# Patient Record
Sex: FEMALE | Race: WHITE | NOT HISPANIC OR LATINO | Employment: FULL TIME | URBAN - METROPOLITAN AREA
[De-identification: names, ages, dates, MRNs, and addresses within clinical notes are randomized per-mention and may not be internally consistent; named-entity substitution may affect disease eponyms.]

---
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
## 2020-10-05 ENCOUNTER — OFFICE VISIT (OUTPATIENT)
Dept: FAMILY MEDICINE | Facility: CLINIC | Age: 40
End: 2020-10-05
Payer: COMMERCIAL

## 2020-10-05 VITALS
RESPIRATION RATE: 16 BRPM | TEMPERATURE: 98 F | HEART RATE: 89 BPM | OXYGEN SATURATION: 97 % | WEIGHT: 156.6 LBS | DIASTOLIC BLOOD PRESSURE: 74 MMHG | HEIGHT: 63 IN | BODY MASS INDEX: 27.75 KG/M2 | SYSTOLIC BLOOD PRESSURE: 110 MMHG

## 2020-10-05 DIAGNOSIS — Z00.00 ROUTINE HISTORY AND PHYSICAL EXAMINATION OF ADULT: Primary | ICD-10-CM

## 2020-10-05 DIAGNOSIS — Z01.00 VISIT FOR EYE AND VISION EXAM: ICD-10-CM

## 2020-10-05 DIAGNOSIS — Z01.10 ENCOUNTER FOR HEARING EXAMINATION, UNSPECIFIED WHETHER ABNORMAL FINDINGS: ICD-10-CM

## 2020-10-05 DIAGNOSIS — Z01.110 ENCOUNTER FOR HEARING EXAMINATION FOLLOWING FAILED HEARING SCREENING: ICD-10-CM

## 2020-10-05 DIAGNOSIS — F64.9 GENDER DYSPHORIA: ICD-10-CM

## 2020-10-05 DIAGNOSIS — Z23 NEED FOR PROPHYLACTIC VACCINATION AND INOCULATION AGAINST INFLUENZA: ICD-10-CM

## 2020-10-05 DIAGNOSIS — Z13.9 SCREENING FOR CONDITION: ICD-10-CM

## 2020-10-05 PROBLEM — O14.90 PREECLAMPSIA: Status: ACTIVE | Noted: 2020-10-05

## 2020-10-05 PROBLEM — O14.20 HELLP SYNDROME: Status: ACTIVE | Noted: 2020-10-05

## 2020-10-05 PROCEDURE — 99000 SPECIMEN HANDLING OFFICE-LAB: CPT | Performed by: FAMILY MEDICINE

## 2020-10-05 PROCEDURE — 87591 N.GONORRHOEAE DNA AMP PROB: CPT | Performed by: FAMILY MEDICINE

## 2020-10-05 PROCEDURE — 36415 COLL VENOUS BLD VENIPUNCTURE: CPT | Performed by: STUDENT IN AN ORGANIZED HEALTH CARE EDUCATION/TRAINING PROGRAM

## 2020-10-05 PROCEDURE — 86706 HEP B SURFACE ANTIBODY: CPT | Performed by: FAMILY MEDICINE

## 2020-10-05 PROCEDURE — 99385 PREV VISIT NEW AGE 18-39: CPT | Mod: 25 | Performed by: STUDENT IN AN ORGANIZED HEALTH CARE EDUCATION/TRAINING PROGRAM

## 2020-10-05 PROCEDURE — 86780 TREPONEMA PALLIDUM: CPT | Performed by: FAMILY MEDICINE

## 2020-10-05 PROCEDURE — 87389 HIV-1 AG W/HIV-1&-2 AB AG IA: CPT | Performed by: FAMILY MEDICINE

## 2020-10-05 PROCEDURE — 90686 IIV4 VACC NO PRSV 0.5 ML IM: CPT | Performed by: STUDENT IN AN ORGANIZED HEALTH CARE EDUCATION/TRAINING PROGRAM

## 2020-10-05 PROCEDURE — 90472 IMMUNIZATION ADMIN EACH ADD: CPT | Performed by: STUDENT IN AN ORGANIZED HEALTH CARE EDUCATION/TRAINING PROGRAM

## 2020-10-05 PROCEDURE — 86803 HEPATITIS C AB TEST: CPT | Performed by: FAMILY MEDICINE

## 2020-10-05 PROCEDURE — 90715 TDAP VACCINE 7 YRS/> IM: CPT | Performed by: STUDENT IN AN ORGANIZED HEALTH CARE EDUCATION/TRAINING PROGRAM

## 2020-10-05 PROCEDURE — 90471 IMMUNIZATION ADMIN: CPT | Performed by: STUDENT IN AN ORGANIZED HEALTH CARE EDUCATION/TRAINING PROGRAM

## 2020-10-05 PROCEDURE — 87491 CHLMYD TRACH DNA AMP PROBE: CPT | Performed by: FAMILY MEDICINE

## 2020-10-05 ASSESSMENT — MIFFLIN-ST. JEOR: SCORE: 1354.46

## 2020-10-05 NOTE — LETTER
October 7, 2020       Luis Gutierrez  [address]  [place] [address]        Dear Luis,    Thank you for getting your care at [facility]. Please see below for your test results.  These results are all normal and reassuring.    Resulted Orders   Neisseria gonorrhoeae PCR   Result Value Ref Range    Specimen Descrip Urine     N Gonorrhea PCR Negative NEG^Negative      Comment:      Negative for N. gonorrhoeae rRNA by transcription mediated amplification.  A negative result by transcription mediated amplification does not preclude   the presence of N. gonorrhoeae infection because results are dependent on   proper and adequate collection, absence of inhibitors, and sufficient rRNA to   be detected.     Chlamydia trachomatis PCR   Result Value Ref Range    Specimen Description Urine     Chlamydia Trachomatis PCR Negative NEG^Negative      Comment:      Negative for C. trachomatis rRNA by transcription mediated amplification.  A negative result by transcription mediated amplification does not preclude   the presence of C. trachomatis infection because results are dependent on   proper and adequate collection, absence of inhibitors, and sufficient rRNA to   be detected.     Treponema Abs w Reflex to RPR and Titer   Result Value Ref Range    Treponema Antibodies Nonreactive NR^Nonreactive      Comment:      Methodology Change: Test performed on the GRAVIDI Liaison XL by Treponema   pallidum Total Antibodies Assay as of 3.17.2020.     HIV Antigen Antibody Combo   Result Value Ref Range    HIV Antigen Antibody Combo Nonreactive NR^Nonreactive          Comment:      HIV-1 p24 Ag & HIV-1/HIV-2 Ab Not Detected   Hepatitis B Surface Antibody   Result Value Ref Range    Hepatitis B Surface Antibody 302.17 (H) <8.00 m[IU]/mL      Comment:      Reactive, Patient is considered to be immune to infection with hepatitis B   when the value is greater than or equal to 12.0 m[IU]/mL.     Hepatitis C antibody   Result Value  Ref Range    Hepatitis C Antibody Nonreactive NR^Nonreactive      Comment:      Assay performance characteristics have not been established for newborns,   infants, and children         If you have any concerns about these results please call and leave a message for me or send a Vaximmt message to the clinic.    Sincerely,    Gladis Ray MD

## 2020-10-06 LAB
C TRACH DNA SPEC QL NAA+PROBE: NEGATIVE
HBV SURFACE AB SERPL IA-ACNC: 302.17 M[IU]/ML
HCV AB SERPL QL IA: NONREACTIVE
HIV 1+2 AB+HIV1 P24 AG SERPL QL IA: NONREACTIVE
N GONORRHOEA DNA SPEC QL NAA+PROBE: NEGATIVE
SPECIMEN SOURCE: NORMAL
SPECIMEN SOURCE: NORMAL
T PALLIDUM AB SER QL: NONREACTIVE

## 2020-10-09 ENCOUNTER — OFFICE VISIT (OUTPATIENT)
Dept: OPHTHALMOLOGY | Facility: CLINIC | Age: 40
End: 2020-10-09
Attending: FAMILY MEDICINE
Payer: COMMERCIAL

## 2020-10-09 DIAGNOSIS — Z01.00 VISIT FOR EYE AND VISION EXAM: ICD-10-CM

## 2020-10-09 DIAGNOSIS — H52.223 REGULAR ASTIGMATISM OF BOTH EYES: Primary | ICD-10-CM

## 2020-10-09 PROCEDURE — 92004 COMPRE OPH EXAM NEW PT 1/>: CPT | Performed by: OPTOMETRIST

## 2020-10-09 PROCEDURE — 92015 DETERMINE REFRACTIVE STATE: CPT | Performed by: OPTOMETRIST

## 2020-10-09 ASSESSMENT — REFRACTION_MANIFEST
OD_CYLINDER: +0.50
OS_CYLINDER: +0.50
OS_SPHERE: +0.75
OD_SPHERE: -0.25
OD_AXIS: 015
OS_AXIS: 165

## 2020-10-09 ASSESSMENT — EXTERNAL EXAM - RIGHT EYE: OD_EXAM: NORMAL

## 2020-10-09 ASSESSMENT — TONOMETRY
OS_IOP_MMHG: 16
IOP_METHOD: ICARE
OD_IOP_MMHG: 16

## 2020-10-09 ASSESSMENT — VISUAL ACUITY
OS_SC: 20/20
OD_SC: 20/20
METHOD_MR_RETINOSCOPY: 1
METHOD: SNELLEN - LINEAR
OS_SC: 20/20
OD_SC: 20/20

## 2020-10-09 ASSESSMENT — CONF VISUAL FIELD
OD_NORMAL: 1
OS_NORMAL: 1
METHOD: COUNTING FINGERS

## 2020-10-09 ASSESSMENT — CUP TO DISC RATIO
OS_RATIO: 0.25
OD_RATIO: 0.25

## 2020-10-09 ASSESSMENT — EXTERNAL EXAM - LEFT EYE: OS_EXAM: NORMAL

## 2020-10-09 ASSESSMENT — SLIT LAMP EXAM - LIDS
COMMENTS: NORMAL
COMMENTS: NORMAL

## 2020-10-09 NOTE — NURSING NOTE
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 Denies blurred vision, doesn't wear gls or CTL.     Radha Mcgowan COT October 9, 2020 7:41 AM

## 2020-10-09 NOTE — PROGRESS NOTES
Assessment/Plan  (H52.223) Regular astigmatism of both eyes  (primary encounter diagnosis)  Comment: Minimal. Excellent uncorrected vision  Plan: REFRACTION [74688]        SRx updated and released for patient. Plan on monitoring every 1-2 years. RTC sooner with vision changes or eye pain.     (Z01.00) Visit for eye and vision exam  Plan: See above. Monitor regularly.       Complete documentation of historical and exam elements from today's encounter can  be found in the full encounter summary report (not reduplicated in this progress  note). I personally obtained the chief complaint(s) and history of present illness. I  confirmed and edited as necessary the review of systems, past medical/surgical  history, family history, social history, and examination findings as documented by  others; and I examined the patient myself. I personally reviewed the relevant tests,  images, and reports as documented above. I formulated and edited as necessary the  assessment and plan and discussed the findings and management plan with the  patient and family.    Stuart Harrington, OD

## 2020-10-09 NOTE — PROGRESS NOTES
Preceptor Attestation:   Patient seen, evaluated and discussed with the resident. I have verified the content of the note, which accurately reflects my assessment of the patient and the plan of care.   Supervising Physician:  Joyce Kohli, DO

## 2020-10-16 ENCOUNTER — TELEPHONE (OUTPATIENT)
Dept: AUDIOLOGY | Facility: CLINIC | Age: 40
End: 2020-10-16

## 2020-11-08 NOTE — PROGRESS NOTES
"       JASMINA Smith is a 40 year old individual that uses pronouns they/them and is consulting due to:    Find out about taking hormones - and what needs to be done.  Interested in sterilization. Had HELLP, pre-e. Tried IUD, not a good fit.    Gender identity  Gender Identity or expression  Non-binary. Genderqueer.     Sex Assigned at Birth   female    Sexual or romantic orientation  [place]ahl    Gender journey:   Didn't think about this much before. Experience of being pregnant (9 years ago), started to feel dysphoria. Maybe didn't recognize it as that then. More apparent in the last 5-6 years. \"Feels late\" because they are 40, so put off thinkin about it.   Don't necessarily want to \"pass as a man\" but feeling more home in body.   In certain situations feels dysphoric - ex OBGYN exam, or way they are treated in hospital or assumptions about what they wanted in their care.   Clothing, etc.     Support network for gender identity:   Don't live close to family of origin.  Here for 20 years. College friends who they are close with - they are supported.  Out to partner and friends. Talked to parents about it but unsure how much they recognized or understood it. Not a bid deal to them. Therapist. Not anyone at work - this is kind of tricky, just started as a . Trying to pay low. No \"safety\" concerns but more concerns re: losing work.    Anatomic dysphoria  Chest, sometimes upper thighs.  Clothing has helped. Doesn't wear bras.     Body characteristics pt is happy with and does not want to change:    More body hair, \"having a bigger sherman,\" Voice.  Unsure how long they will take it for.  Facial hair.      Body characteristics pt is NOT happy with and WANTS to change:    Doesn't want acne    Procedures or Surgical interventions desired:   Sterilization - meaning: tubes tied, ?hysterectomy?. Unsure. Reproductive potential is what is bothersome, but would also be great to be rid of menses.  (Pregnancy not a " potential in current partnership.)  Eventually top surgery?    Previous medical care related to gender affirmation:   Prior providers None  What hormones have you been on and for how long? None  Previous surgeries related to gender affirmation include: None    Mental Health Assessment:  Active symptoms: anxiety primarily > some depression which is what they are seeing therapist for   Psych dx history Major depression  Psych hospitalizations none  Hx of self harm:  No  Hx of suicidal thoughts: No  Hx of suicide attempts: No    Therapy history for gender support: Yes - has discussed with general therapist  Non gender related therapist? Yes  Chemical use history None    Medications prescribed for above and by whom? None  External Records received: No    Past History   Past Surgical History:   Procedure Laterality Date      SECTION  2012     Patient Active Problem List   Diagnosis     Preeclampsia     HELLP syndrome     High-risk pregnancy supervision     Labor, failed, induction medical     Obesity (BMI 30-39.9)     S/P  section     Nicotine dependence, uncomplicated, unspecified nicotine product type     Depression   No hx VTE, MI, stroke, CA, significant mental health concerns.      No current outpatient medications on file.     Family History   Problem Relation Age of Onset     Hypertension Father      Substance Abuse Father      Cancer Maternal Grandfather 50        Bladder cancer. Smoker.     Heart Disease Maternal Grandfather      Asthma Daughter      Glaucoma No family hx of      Macular Degeneration No family hx of        No Known Allergies  Social History     Socioeconomic History     Marital status: Single     Spouse name: Not on file     Number of children: Not on file     Years of education: Not on file     Highest education level: Not on file   Occupational History     Not on file   Social Needs     Financial resource strain: Not on file     Food insecurity     Worry: Not on file      "Inability: Not on file     Transportation needs     Medical: Not on file     Non-medical: Not on file   Tobacco Use     Smoking status: Current Every Day Smoker     Types: Cigarettes     Smokeless tobacco: Never Used   Substance and Sexual Activity     Alcohol use: Yes     Drug use: Yes     Types: Marijuana     Sexual activity: Yes   Lifestyle     Physical activity     Days per week: Not on file     Minutes per session: Not on file     Stress: Not on file   Relationships     Social connections     Talks on phone: Not on file     Gets together: Not on file     Attends Hinduism service: Not on file     Active member of club or organization: Not on file     Attends meetings of clubs or organizations: Not on file     Relationship status: Not on file     Intimate partner violence     Fear of current or ex partner: Not on file     Emotionally abused: Not on file     Physically abused: Not on file     Forced sexual activity: Not on file   Other Topics Concern     Not on file   Social History Narrative     Not on file     Occasional EtOH <5 drinks per week  Tobacco - smokes. Wishing to quit.  [place]    Sexual health and relationships:  Fertility plans:    None. No pregnancy potential in current partnership           Review of Systems:        7 point ROS negative unless stated          Physical Exam:     Vitals:    11/09/20 1542   BP: 118/79   Pulse: 70   Temp: 98.1  F (36.7  C)   TempSrc: Oral   SpO2: 100%   Weight: 72.6 kg (160 lb)   Height: 1.61 m (5' 3.39\")     BMI= Body mass index is 28 kg/m .   Wt Readings from Last 10 Encounters:   11/09/20 72.6 kg (160 lb)   10/05/20 71 kg (156 lb 9.6 oz)     GENERAL:: healthy, alert and no distress  Affect: Appropriate/mood-congruent       Labs:     Office Visit on 10/05/2020   Component Date Value Ref Range Status     Specimen Descrip 10/05/2020 Urine   Final     N Gonorrhea PCR 10/05/2020 Negative  NEG^Negative Final    Comment: Negative for N. gonorrhoeae rRNA by transcription " mediated amplification.  A negative result by transcription mediated amplification does not preclude   the presence of N. gonorrhoeae infection because results are dependent on   proper and adequate collection, absence of inhibitors, and sufficient rRNA to   be detected.       Specimen Description 10/05/2020 Urine   Final     Chlamydia Trachomatis PCR 10/05/2020 Negative  NEG^Negative Final    Comment: Negative for C. trachomatis rRNA by transcription mediated amplification.  A negative result by transcription mediated amplification does not preclude   the presence of C. trachomatis infection because results are dependent on   proper and adequate collection, absence of inhibitors, and sufficient rRNA to   be detected.       Treponema Antibodies 10/05/2020 Nonreactive  NR^Nonreactive Final    Comment: Methodology Change: Test performed on the [employer] Liaison XL by Treponema   pallidum Total Antibodies Assay as of 3.17.2020.       HIV Antigen Antibody Combo 10/05/2020 Nonreactive  NR^Nonreactive     Final    HIV-1 p24 Ag & HIV-1/HIV-2 Ab Not Detected     Hepatitis B Surface Antibody 10/05/2020 302.17* <8.00 m[IU]/mL Final    Comment: Reactive, Patient is considered to be immune to infection with hepatitis B   when the value is greater than or equal to 12.0 m[IU]/mL.       Hepatitis C Antibody 10/05/2020 Nonreactive  NR^Nonreactive Final    Comment: Assay performance characteristics have not been established for newborns,   infants, and children       Assessment and Plan     Luis was seen today for recheck.    Diagnoses and all orders for this visit:    Gender dysphoria  Gender affirming care intake done today. Consent form given to review.  I see no contraindications to gender affirming HRT  At next visit, will:    Discuss routes of T desired    Obtain labs    Review consent in detail    [facility] referral placed to explore sterilization options  -     COMPREHENSIVE GENDER CARE REFERRAL - INTERNAL    Smoking  Nicotine  dependence, uncomplicated, unspecified nicotine product type  Wishing to quit.  Will discuss more in future visits         Educated about 3 parts of evaluation:    1. Medical safety for hormones :   Medication plan:   masculinizing hormone therapy with testosterone   Next labs and exam:  Next able    Counselled patient about controlled substances, never share, comes on paper prescription: No  Contraception:   not needed at this time  Educated about testosterone as absolute contraindication in pregnancy: No    2. Mental health assessment  No concerns.  Supports in place already for MH.    3. Informed consent process.   Consent  Yes Is able to provide informed consent   Yes Likely to take hormones in a responsible manner  No Discussed physical effects, benefits, and risk assessment & modification  No Discussed the clinical and biochemical monitoring of hormonal changes and the potential impact on reproductive health & fertility  We discussed thoroughly risks/benefits/alternatives of this treatment. Questions elicited and answered. Pt is fully prepared to start hormones and is able to reason through risks and mgmt. Questions elicited and answered and they also consent, as is legally required. See scanned in media tab.       Today's visit included assessment of interventions to alleviate symptoms related to gender dysphoria or gender nonconformity, including psychological support, medical treatment, and options for social support or changes in gender expression. Today's visit also assessed this individual's suicide risk, as transgender patients are at higher risk of suicide, gender expression, gender identity and how well consolidated in that identity, presence or absence of gender dysphoria versus gender non-conformity, and assessment and diagnosis of coexisting mental health concerns.This assessment is based on the 2011 published Standards of Care for the Health of Transsexual, Transgender, and Gender-Nonconforming  People, Version 7, by the [employer] Transgender [employer].    Gladis Ray MD

## 2020-11-09 ENCOUNTER — OFFICE VISIT (OUTPATIENT)
Dept: FAMILY MEDICINE | Facility: CLINIC | Age: 40
End: 2020-11-09
Payer: COMMERCIAL

## 2020-11-09 VITALS
OXYGEN SATURATION: 100 % | TEMPERATURE: 98.1 F | SYSTOLIC BLOOD PRESSURE: 118 MMHG | BODY MASS INDEX: 28.35 KG/M2 | HEART RATE: 70 BPM | DIASTOLIC BLOOD PRESSURE: 79 MMHG | HEIGHT: 63 IN | WEIGHT: 160 LBS

## 2020-11-09 DIAGNOSIS — F64.9 GENDER DYSPHORIA: Primary | ICD-10-CM

## 2020-11-09 DIAGNOSIS — F17.200 SMOKING: ICD-10-CM

## 2020-11-09 DIAGNOSIS — F34.1 DYSTHYMIA: ICD-10-CM

## 2020-11-09 DIAGNOSIS — F17.200 NICOTINE DEPENDENCE, UNCOMPLICATED, UNSPECIFIED NICOTINE PRODUCT TYPE: ICD-10-CM

## 2020-11-09 PROBLEM — F32.A DEPRESSION: Status: ACTIVE | Noted: 2020-11-09

## 2020-11-09 PROCEDURE — 99214 OFFICE O/P EST MOD 30 MIN: CPT | Mod: GC | Performed by: STUDENT IN AN ORGANIZED HEALTH CARE EDUCATION/TRAINING PROGRAM

## 2020-11-09 ASSESSMENT — MIFFLIN-ST. JEOR: SCORE: 1376.01

## 2020-11-16 NOTE — PROGRESS NOTES
"       HPI       Luis Gutierrez is a 40 year old  who presents for   Chief Complaint   Patient presents with     Recheck Medication     HRT follow up      HRT  Consent reviewed  Will do labs today  Questions answered  Will do injectiblt    Smoking cessation  Smoking  1 pack in 3-4 days.  Quit before many times, cold turkey  Before returned due to routines, social smoking, but now not much friends smoke anymore.  Confidence 6-7.  Barriers - would stop, feel very good, but 5 days later \"feels very good I want a cigarette!\"  Quit date: sometime this week or next week.  Have told others.     +++++++    Problem, Medication and Allergy Lists were reviewed and updated if needed..    Patient is an established patient of this clinic..         Review of Systems:   Review of Systems  7 points negative unless specified       Physical Exam:     Vitals:    11/17/20 0811   BP: 107/72   BP Location: Left arm   Patient Position: Sitting   Cuff Size: Adult Regular   Pulse: 76   Resp: 16   Temp: 98.2  F (36.8  C)   TempSrc: Oral   SpO2: 98%   Weight: 74.1 kg (163 lb 6.4 oz)   Height: 1.626 m (5' 4\")     Body mass index is 28.05 kg/m .  Vitals were reviewed and were normal     Physical Exam  Constitutional:       General: Luis Gutierrez is not in acute distress.     Appearance: Luis Gutierrez is well-developed. Luis Gutierrez is not diaphoretic.   HENT:      Head: Normocephalic and atraumatic.   Eyes:      Conjunctiva/sclera: Conjunctivae normal.   Neck:      Musculoskeletal: Normal range of motion.   Cardiovascular:      Rate and Rhythm: Normal rate.   Pulmonary:      Effort: Pulmonary effort is normal. No respiratory distress.   Musculoskeletal: Normal range of motion.   Neurological:      General: No focal deficit present.      Mental Status: Luis Gutierrez is alert.           Results:   Results are ordered and pending    Assessment and Plan        Luis was seen today for recheck medication.    Diagnoses and all orders for this " "visit:    Gender dysphoria  Initiate HRT with subcutaneous T, 40 mg weekly.  Consent reviewed and signed.  Labs done today.  Will return for nurse injection teaching once supplies in hand.  F/u visit in 1 month  -     Testosterone total  -     CBC with Diff Plt ([facility])  -     Comprehensive Metabolic Panel ([facility])  -     Lipid Cascade ([facility])  -     testosterone cypionate (DEPOTESTOSTERONE) 200 MG/ML injection; Inject 0.2 mLs (40 mg) subcutaneously once a week  -     needle, disp, 18G X 1\" MISC; Use to draw up hormones once weekly  -     syringe, disposable, (B-D SYRINGE LUER-COURTNEY) 1 ML MISC; Use to draw hormones weekly  -     Needle, Disp, 25G X 5/8\" MISC; 1 each once a week    Nicotine dependence, uncomplicated, unspecified nicotine product type  Discussed smoking cessation today. History per above. Will start Chantix. Anticipatory guidance and precautions given. Quit date: in 1 week.  -     varenicline (CHANTIX LAURENT) 0.5 MG X 11 & 1 MG X 42 tablet; Take 0.5 mg tab daily for 3 days, THEN 0.5 mg tab twice daily for 4 days, THEN 1 mg twice daily.           There are no discontinued medications.    Options for treatment and follow-up care were reviewed with the patient. Luis Gutierrez  engaged in the decision making process and verbalized understanding of the options discussed and agreed with the final plan.    Gladis Rya MD  RTC in 1 month  "

## 2020-11-17 ENCOUNTER — OFFICE VISIT (OUTPATIENT)
Dept: FAMILY MEDICINE | Facility: CLINIC | Age: 40
End: 2020-11-17
Payer: COMMERCIAL

## 2020-11-17 VITALS
HEART RATE: 76 BPM | SYSTOLIC BLOOD PRESSURE: 107 MMHG | RESPIRATION RATE: 16 BRPM | HEIGHT: 64 IN | OXYGEN SATURATION: 98 % | WEIGHT: 163.4 LBS | BODY MASS INDEX: 27.9 KG/M2 | DIASTOLIC BLOOD PRESSURE: 72 MMHG | TEMPERATURE: 98.2 F

## 2020-11-17 DIAGNOSIS — F17.200 NICOTINE DEPENDENCE, UNCOMPLICATED, UNSPECIFIED NICOTINE PRODUCT TYPE: ICD-10-CM

## 2020-11-17 DIAGNOSIS — F64.9 GENDER DYSPHORIA: Primary | ICD-10-CM

## 2020-11-17 LAB
% GRANULOCYTES: 67.1 %G (ref 40–75)
ALBUMIN SERPL-MCNC: 3.9 MG/DL (ref 3.8–5)
ALP SERPL-CCNC: 41.3 U/L (ref 31.7–110.5)
ALT SERPL-CCNC: 15 U/L (ref 0–45)
AST SERPL-CCNC: 20 U/L (ref 0–45)
BILIRUB SERPL-MCNC: 0.5 MG/DL (ref 0.2–1.3)
BUN SERPL-MCNC: 12.1 MG/DL (ref 7–19)
CALCIUM SERPL-MCNC: 9.2 MG/DL (ref 8.5–10.1)
CHLORIDE SERPLBLD-SCNC: 103.6 MMOL/L (ref 98–110)
CHOLEST SERPL-MCNC: 213.9 MG/DL (ref 0–200)
CHOLEST/HDLC SERPL: 3 {RATIO} (ref 0–5)
CO2 SERPL-SCNC: 26.8 MMOL/L (ref 20–32)
CREAT SERPL-MCNC: 0.7 MG/DL (ref 0.5–1)
GFR SERPL CREATININE-BSD FRML MDRD: >90 ML/MIN/1.7 M2
GLUCOSE SERPL-MCNC: 86.8 MG'DL (ref 70–99)
GRANULOCYTES #: 3.4 K/UL (ref 1.6–8.3)
HCT VFR BLD AUTO: 42.9 % (ref 35–47)
HDLC SERPL-MCNC: 72.4 MG/DL
HEMOGLOBIN: 13.7 G/DL (ref 11.7–15.7)
LDLC SERPL CALC-MCNC: 125 MG/DL (ref 0–129)
LYMPHOCYTES # BLD AUTO: 1.3 K/UL (ref 0.8–5.3)
LYMPHOCYTES NFR BLD AUTO: 27 %L (ref 20–48)
MCH RBC QN AUTO: 30.9 PG (ref 26.5–35)
MCHC RBC AUTO-ENTMCNC: 31.9 G/DL (ref 32–36)
MCV RBC AUTO: 96.8 FL (ref 78–100)
MID #: 0.3 K/UL (ref 0–2.2)
MID %: 5.9 %M (ref 0–20)
PLATELET # BLD AUTO: 238 K/UL (ref 150–450)
POTASSIUM SERPL-SCNC: 3.8 MMOL/L (ref 3.3–4.5)
PROT SERPL-MCNC: 5.9 G/DL (ref 6.8–8.8)
RBC # BLD AUTO: 4.43 M/UL (ref 3.8–5.2)
SODIUM SERPL-SCNC: 136.6 MMOL/L (ref 132.6–141.4)
TRIGL SERPL-MCNC: 81.8 MG/DL (ref 0–150)
VLDL CHOLESTEROL: 16.4 MG/DL (ref 7–32)
WBC # BLD AUTO: 5 K/UL (ref 4–11)

## 2020-11-17 PROCEDURE — 36415 COLL VENOUS BLD VENIPUNCTURE: CPT | Performed by: STUDENT IN AN ORGANIZED HEALTH CARE EDUCATION/TRAINING PROGRAM

## 2020-11-17 PROCEDURE — 85025 COMPLETE CBC W/AUTO DIFF WBC: CPT | Performed by: STUDENT IN AN ORGANIZED HEALTH CARE EDUCATION/TRAINING PROGRAM

## 2020-11-17 PROCEDURE — 80053 COMPREHEN METABOLIC PANEL: CPT | Performed by: STUDENT IN AN ORGANIZED HEALTH CARE EDUCATION/TRAINING PROGRAM

## 2020-11-17 PROCEDURE — 99000 SPECIMEN HANDLING OFFICE-LAB: CPT | Performed by: STUDENT IN AN ORGANIZED HEALTH CARE EDUCATION/TRAINING PROGRAM

## 2020-11-17 PROCEDURE — 84403 ASSAY OF TOTAL TESTOSTERONE: CPT | Performed by: STUDENT IN AN ORGANIZED HEALTH CARE EDUCATION/TRAINING PROGRAM

## 2020-11-17 PROCEDURE — 99214 OFFICE O/P EST MOD 30 MIN: CPT | Mod: GC | Performed by: STUDENT IN AN ORGANIZED HEALTH CARE EDUCATION/TRAINING PROGRAM

## 2020-11-17 PROCEDURE — 80061 LIPID PANEL: CPT | Performed by: STUDENT IN AN ORGANIZED HEALTH CARE EDUCATION/TRAINING PROGRAM

## 2020-11-17 RX ORDER — SYRINGE, DISPOSABLE, 1 ML
SYRINGE, EMPTY DISPOSABLE MISCELLANEOUS
Qty: 25 EACH | Refills: 1 | Status: SHIPPED | OUTPATIENT
Start: 2020-11-17 | End: 2020-12-15

## 2020-11-17 RX ORDER — TESTOSTERONE CYPIONATE 200 MG/ML
40 INJECTION, SOLUTION INTRAMUSCULAR WEEKLY
Qty: 4 ML | Refills: 0 | Status: SHIPPED | OUTPATIENT
Start: 2020-11-17 | End: 2020-12-15

## 2020-11-17 RX ORDER — TESTOSTERONE CYPIONATE 200 MG/ML
40 INJECTION, SOLUTION INTRAMUSCULAR WEEKLY
Qty: 4 ML | Refills: 0 | Status: SHIPPED | OUTPATIENT
Start: 2020-11-17 | End: 2020-11-17

## 2020-11-17 ASSESSMENT — MIFFLIN-ST. JEOR: SCORE: 1401.18

## 2020-11-19 LAB — TESTOST SERPL-MCNC: 33 NG/DL (ref 8–60)

## 2020-11-20 ENCOUNTER — ALLIED HEALTH/NURSE VISIT (OUTPATIENT)
Dept: FAMILY MEDICINE | Facility: CLINIC | Age: 40
End: 2020-11-20
Payer: COMMERCIAL

## 2020-11-20 DIAGNOSIS — F64.9 GENDER DYSPHORIA: Primary | ICD-10-CM

## 2020-11-20 PROCEDURE — 99211 OFF/OP EST MAY X REQ PHY/QHP: CPT

## 2020-11-20 NOTE — NURSING NOTE
"Patient presented to clinic with all supplies for testosterone injection teaching.     RN reviewed necessary supplies: difference in needles (drawing up vs injecting), how to read a syringe, how to read medication label, disposal of sharps, and proper hand hygiene.     Discussed how to switch out needles and patient demonstrated understanding of reading syringe. RN reviewed safe needle handling (using \"scoop\" method). Patient independently daxa up proper amount of Testosterone.     RN discussed site for SQ injection and reviewed proper SQ injection technique. Patient practiced using injecting pad.    At end of visit, patient independently completed self-injection of 0.2 mL (40 mg) Testosterone into abdominal tissue under supervision of RN.    Completed visit with discussion of refill policy.     Patient verbalized understanding and was engaged during appointment.    Dr. Lopez was the preceptor on site for this visit and available for questions.    Daisy Ritchie RN      "

## 2020-11-23 NOTE — PROGRESS NOTES
Preceptor Attestation:    Patient seen and evaluated in person. I discussed the patient with the resident. I have verified the content of the note, which accurately reflects my assessment of the patient and the plan of care.   Supervising Physician:  Tesfaye Lopez MD.

## 2020-12-08 ENCOUNTER — TELEPHONE (OUTPATIENT)
Dept: PLASTIC SURGERY | Facility: CLINIC | Age: 40
End: 2020-12-08

## 2020-12-08 NOTE — TELEPHONE ENCOUNTER
Called pt regarding referral to OBGYN from Dr. Ray at [facility]. Spoke to pt about referral process to [facility]. Pt consented to writer sending their information to [facility]. Sent IB message to [facility] HIPOLITO Stringer

## 2020-12-15 ENCOUNTER — MYC REFILL (OUTPATIENT)
Dept: FAMILY MEDICINE | Facility: CLINIC | Age: 40
End: 2020-12-15

## 2020-12-15 DIAGNOSIS — F64.9 GENDER DYSPHORIA: ICD-10-CM

## 2020-12-17 RX ORDER — TESTOSTERONE CYPIONATE 200 MG/ML
40 INJECTION, SOLUTION INTRAMUSCULAR WEEKLY
Qty: 4 ML | Refills: 0 | Status: SHIPPED | OUTPATIENT
Start: 2020-12-17 | End: 2021-01-14

## 2020-12-17 RX ORDER — SYRINGE, DISPOSABLE, 1 ML
SYRINGE, EMPTY DISPOSABLE MISCELLANEOUS
Qty: 25 EACH | Refills: 1 | Status: SHIPPED | OUTPATIENT
Start: 2020-12-17 | End: 2021-01-14

## 2020-12-18 ENCOUNTER — OFFICE VISIT (OUTPATIENT)
Dept: FAMILY MEDICINE | Facility: CLINIC | Age: 40
End: 2020-12-18
Payer: COMMERCIAL

## 2020-12-18 VITALS
BODY MASS INDEX: 28.36 KG/M2 | DIASTOLIC BLOOD PRESSURE: 69 MMHG | HEART RATE: 65 BPM | OXYGEN SATURATION: 97 % | TEMPERATURE: 97.9 F | SYSTOLIC BLOOD PRESSURE: 101 MMHG | WEIGHT: 165.2 LBS | RESPIRATION RATE: 16 BRPM

## 2020-12-18 DIAGNOSIS — F64.0 GENDER DYSPHORIA IN ADOLESCENT AND ADULT: Primary | ICD-10-CM

## 2020-12-18 DIAGNOSIS — F17.200 NICOTINE DEPENDENCE, UNCOMPLICATED, UNSPECIFIED NICOTINE PRODUCT TYPE: ICD-10-CM

## 2020-12-18 PROCEDURE — 99214 OFFICE O/P EST MOD 30 MIN: CPT | Mod: GC | Performed by: STUDENT IN AN ORGANIZED HEALTH CARE EDUCATION/TRAINING PROGRAM

## 2020-12-18 NOTE — PROGRESS NOTES
"I was present with the medical student who participated in the service and in the documentation of this note. I have verified the history and personally performed the physical exam and medical decision making, and have verified the content of the note, which accurately reflects my assessment of the patient and the plan of care.   Gladis Ray MD               JASMINA Smith is a 40 year old individual that uses pronouns They/Them/Their/Theirs that presents today for follow up of:  masculinizing hormone therapy.      Gender identity: Non-binary    Any special concerns today?  no current concerns - tolerating the treatments well.     On hormones?  YES  Shot day of the week? Friday      Due for labs?  No      +++ Refills of meds needed?  Yes    Gender affirmation is being sought in these other ways:   The patient has good community support, pronoun support at home, and is interested in pursuing voice therapy. They are not able to be out at work, but otherwise have good support elsewhere.     The patient has not been tolerating their chantix because of nightmares and sleeping in one day that caused them to miss work.     ---    Past Surgical History:   Procedure Laterality Date      SECTION  2012       Patient Active Problem List   Diagnosis     Preeclampsia     HELLP syndrome     High-risk pregnancy supervision     Labor, failed, induction medical     Obesity (BMI 30-39.9)     S/P  section     Nicotine dependence, uncomplicated, unspecified nicotine product type     Depression     Receiving gender affirming care       Current Outpatient Medications   Medication Sig Dispense Refill     needle, disp, 18G X 1\" MISC Use to draw up hormones once weekly 25 each 1     Needle, Disp, 25G X 5/8\" MISC 1 each once a week 50 each 1     syringe, disposable, (B-D SYRINGE LUER-COURTNEY) 1 ML MISC Use to draw hormones weekly 25 each 1     testosterone cypionate (DEPOTESTOSTERONE) 200 MG/ML injection Inject 0.2 mLs (40 " "mg) Subcutaneous once a week 4 mL 0     varenicline (CHANTIX LAURENT) 0.5 MG X 11 & 1 MG X 42 tablet Take 0.5 mg tab daily for 3 days, THEN 0.5 mg tab twice daily for 4 days, THEN 1 mg twice daily. 53 tablet 0       History   Smoking Status     Current Every Day Smoker     Types: Cigarettes   Smokeless Tobacco     Never Used        No Known Allergies    Problem, Medication and Allergy Lists were      Patient Active Problem List    Diagnosis Date Noted     Receiving gender affirming care 2020     Priority: Medium     They/them   HRT start date: 2020  Current T dose: 40 mg weekly  Last dose change: N/A    Possible procedural considerations:  - Tubal ligation  - Top surgery       Nicotine dependence, uncomplicated, unspecified nicotine product type 2020     Priority: Medium     Depression 2020     Priority: Medium     Preeclampsia 10/05/2020     Priority: Medium     HELLP syndrome 10/05/2020     Priority: Medium     S/P  section 2012     Priority: Medium     Labor, failed, induction medical 2012     Priority: Medium     Obesity (BMI 30-39.9) 2012     Priority: Medium     High-risk pregnancy supervision 2012     Priority: Medium         Current Outpatient Medications   Medication Sig Dispense Refill     needle, disp, 18G X 1\" MISC Use to draw up hormones once weekly 25 each 1     Needle, Disp, 25G X 5/8\" MISC 1 each once a week 50 each 1     syringe, disposable, (B-D SYRINGE LUER-COURTNEY) 1 ML MISC Use to draw hormones weekly 25 each 1     testosterone cypionate (DEPOTESTOSTERONE) 200 MG/ML injection Inject 0.2 mLs (40 mg) Subcutaneous once a week 4 mL 0     varenicline (CHANTIX LAURENT) 0.5 MG X 11 & 1 MG X 42 tablet Take 0.5 mg tab daily for 3 days, THEN 0.5 mg tab twice daily for 4 days, THEN 1 mg twice daily. (Patient not taking: Reported on 2020) 53 tablet 0       No Known Allergies.         Review of Systems:        General    Fat redistribution: no    Weight " "change: YES HEENT    Voice change: slight     Cardiovascular (CV)    Chest Pains: no    Shortness of breath: no Chest    Decreased exercise tolerance:  no    Breast changes/development: no     Gastrointestinal (GI)    Abdominal pain: no    Change in appetite: no Skin    Acne or oily skin: no    Change in hair: no     Genitourinary ()    Abnormal vaginal bleeding: no     Decreased spontaneous erections: no    Change in libido: YES    New sexual partners: no Musculoskeletal    Leg pain or swelling: no     Psychiatric (Psych)    Depression: no    Anxiety/Panic: no    Mood:  \"good\"                    Physical Exam:   There were no vitals filed for this visit.  BMI= There is no height or weight on file to calculate BMI.   Wt Readings from Last 10 Encounters:   11/17/20 74.1 kg (163 lb 6.4 oz)   11/09/20 72.6 kg (160 lb)   10/05/20 71 kg (156 lb 9.6 oz)       GENERAL:: healthy, alert and no distress  RESP: lungs clear to auscultation - no rales, no rhonchi, no wheezes  CV: regular rates and rhythm, normal S1 S2, no S3 or S4 and no murmur, no click or rub -  ABDOMEN: soft, no tenderness, no  hepatosplenomegaly, no masses, normal bowel sounds  SKIN: no suspicious lesions, no rashes  Psych: Alert and oriented times 3; coherent speech, normal rate and volume, able to articulate logical thoughts, able to abstract reason, no tangential thoughts, no hallucinations or delusions. Affect is normal.  Affect: Appropriate/mood-congruent           Labs:   Results from last visit:  Office Visit on 11/17/2020   Component Date Value Ref Range Status     Testosterone Total 11/17/2020 33  8 - 60 ng/dL Final    Comment: This test was developed and its performance characteristics determined by the   [facility].   It has not been cleared or approved by the FDA. The laboratory is regulated   under CLIA as qualified to perform high-complexity testing. This test is used   for " clinical purposes. It should not be regarded as investigational or for   research.       WBC 11/17/2020 5.0  4.0 - 11.0 K/uL Final     Lymphocytes # 11/17/2020 1.3  0.8 - 5.3 K/uL Final     % Lymphocytes 11/17/2020 27.0  20.0 - 48.0 %L Final     Mid # 11/17/2020 0.3  0.0 - 2.2 K/uL Final     Mid % 11/17/2020 5.9  0.0 - 20.0 %M Final     GRANULOCYTES # 11/17/2020 3.4  1.6 - 8.3 K/uL Final     % Granulocytes 11/17/2020 67.1  40.0 - 75.0 %G Final     RBC 11/17/2020 4.43  3.80 - 5.20 M/uL Final     Hemoglobin 11/17/2020 13.7  11.7 - 15.7 g/dL Final     Hematocrit 11/17/2020 42.9  35.0 - 47.0 % Final     MCV 11/17/2020 96.8  78.0 - 100.0 fL Final     MCH 11/17/2020 30.9  26.5 - 35.0 pg Final     MCHC 11/17/2020 31.9* 32.0 - 36.0 g/dL Final     Platelets 11/17/2020 238.0  150.0 - 450.0 K/uL Final     Calcium 11/17/2020 9.2  8.5 - 10.1 mg/dL Final     Chloride 11/17/2020 103.6  98.0 - 110.0 mmol/L Final     Carbon Dioxide 11/17/2020 26.8  20.0 - 32.0 mmol/L Final     Creatinine 11/17/2020 0.7  0.5 - 1.0 mg/dL Final     Glucose 11/17/2020 86.8  70.0 - 99.0 mg'dL Final     Potassium 11/17/2020 3.8  3.3 - 4.5 mmol/L Final     Sodium 11/17/2020 136.6  132.6 - 141.4 mmol/L Final     Protein Total 11/17/2020 5.9* 6.8 - 8.8 g/dL Final     GFR Estimate 11/17/2020 >90  >60.0 mL/min/1.7 m2 Final     GFR Estimate If Black 11/17/2020 >90  >60.0 mL/min/1.7 m2 Final     Albumin 11/17/2020 3.9  3.8 - 5.0 mg/dL Final     Alkaline Phosphatase 11/17/2020 41.3  31.7 - 110.5 U/L Final     ALT 11/17/2020 15.0  0.0 - 45.0 U/L Final     AST 11/17/2020 20.0  0.0 - 45.0 U/L Final     Bilirubin Total 11/17/2020 0.5  0.2 - 1.3 mg/dL Final     Urea Nitrogen 11/17/2020 12.1  7.0 - 19.0 mg/dL Final     Cholesterol 11/17/2020 213.9* 0.0 - 200.0 mg/dL Final     Cholesterol/HDL Ratio 11/17/2020 3.0  0.0 - 5.0 Final     HDL Cholesterol 11/17/2020 72.4  >40.0 mg/dL Final     Triglycerides 11/17/2020 81.8  0.0 - 150.0 mg/dL Final     VLDL Cholesterol  11/17/2020 16.4  7.0 - 32.0 mg/dL Final     LDL Cholesterol Calculated 11/17/2020 125  0 - 129 mg/dL Final       Assessment and Plan     1. Receiving gender affirming care  40 y.o. nonbinary individual presenting for follow up of gender affirming care. Their treatments are going well and at an adequate pace. No interventions or changes needed today, will follow up with the patient in 2 months and recheck testosterone and basic labs.   - Follow up in 2 months.       Contraception:   not needed.     Counselled patient about controlled substances: No    Follow up:  Follow up in 2 months.  [facility]  Questions were elicited and answered.     Gerber Benitez MS4

## 2021-01-14 ENCOUNTER — MYC REFILL (OUTPATIENT)
Dept: FAMILY MEDICINE | Facility: CLINIC | Age: 41
End: 2021-01-14

## 2021-01-14 ENCOUNTER — MYC MEDICAL ADVICE (OUTPATIENT)
Dept: FAMILY MEDICINE | Facility: CLINIC | Age: 41
End: 2021-01-14

## 2021-01-14 DIAGNOSIS — F64.9 GENDER DYSPHORIA: ICD-10-CM

## 2021-01-14 RX ORDER — SYRINGE, DISPOSABLE, 1 ML
SYRINGE, EMPTY DISPOSABLE MISCELLANEOUS
Qty: 25 EACH | Refills: 1 | Status: SHIPPED | OUTPATIENT
Start: 2021-01-14 | End: 2021-04-05

## 2021-01-14 ASSESSMENT — ENCOUNTER SYMPTOMS
INSOMNIA: 1
WEAKNESS: 0
DISTURBANCES IN COORDINATION: 0
PARALYSIS: 0
PANIC: 0
MEMORY LOSS: 0
MUSCLE CRAMPS: 0
STIFFNESS: 1
DECREASED CONCENTRATION: 0
TREMORS: 0
NUMBNESS: 0
MYALGIAS: 1
DEPRESSION: 0
NERVOUS/ANXIOUS: 1
JOINT SWELLING: 0
MUSCLE WEAKNESS: 0
HEADACHES: 0
LOSS OF CONSCIOUSNESS: 0
SPEECH CHANGE: 0
NECK PAIN: 0
ARTHRALGIAS: 1
SEIZURES: 0
BACK PAIN: 0
TINGLING: 1
DIZZINESS: 0

## 2021-01-14 ASSESSMENT — ANXIETY QUESTIONNAIRES
6. BECOMING EASILY ANNOYED OR IRRITABLE: SEVERAL DAYS
GAD7 TOTAL SCORE: 6
4. TROUBLE RELAXING: SEVERAL DAYS
7. FEELING AFRAID AS IF SOMETHING AWFUL MIGHT HAPPEN: SEVERAL DAYS
GAD7 TOTAL SCORE: 6
5. BEING SO RESTLESS THAT IT IS HARD TO SIT STILL: NOT AT ALL
3. WORRYING TOO MUCH ABOUT DIFFERENT THINGS: SEVERAL DAYS
7. FEELING AFRAID AS IF SOMETHING AWFUL MIGHT HAPPEN: SEVERAL DAYS
1. FEELING NERVOUS, ANXIOUS, OR ON EDGE: SEVERAL DAYS
2. NOT BEING ABLE TO STOP OR CONTROL WORRYING: SEVERAL DAYS

## 2021-01-14 NOTE — TELEPHONE ENCOUNTER
"Patient requesting refill of testosterone via mychart. Last office visit 12/18/20 with Dr. Ray. RN unable to prescribe as it is a controlled substance. Routing to PCP to order if appropriate.   Daisy Ritchie RN      Request for medication refill:    Providers if patient needs an appointment and you are willing to give a one month supply please refill for one month and  send a letter/MyChart using \".SMILLIMITEDREFILL\" .smillimited and route chart to \"P SMI \" (Giving one month refill in non controlled medications is strongly recommended before denial)    If refill has been denied, meaning absolutely no refills without visit, please complete the smart phrase \".smirxrefuse\" and route it to the \"P SMI MED REFILLS\"  pool to inform the patient and the pharmacy.        "

## 2021-01-15 RX ORDER — TESTOSTERONE CYPIONATE 200 MG/ML
40 INJECTION, SOLUTION INTRAMUSCULAR WEEKLY
Qty: 4 ML | Refills: 0 | Status: SHIPPED | OUTPATIENT
Start: 2021-01-15 | End: 2021-02-06

## 2021-01-15 ASSESSMENT — ANXIETY QUESTIONNAIRES: GAD7 TOTAL SCORE: 6

## 2021-01-19 ENCOUNTER — OFFICE VISIT (OUTPATIENT)
Dept: OBGYN | Facility: CLINIC | Age: 41
End: 2021-01-19
Payer: COMMERCIAL

## 2021-01-19 VITALS
SYSTOLIC BLOOD PRESSURE: 130 MMHG | HEIGHT: 64 IN | WEIGHT: 167.7 LBS | BODY MASS INDEX: 28.63 KG/M2 | DIASTOLIC BLOOD PRESSURE: 76 MMHG | HEART RATE: 64 BPM

## 2021-01-19 DIAGNOSIS — F64.0 GENDER DYSPHORIA IN ADOLESCENT AND ADULT: Primary | ICD-10-CM

## 2021-01-19 PROCEDURE — 99202 OFFICE O/P NEW SF 15 MIN: CPT | Mod: GE | Performed by: OBSTETRICS & GYNECOLOGY

## 2021-01-19 ASSESSMENT — MIFFLIN-ST. JEOR: SCORE: 1420.68

## 2021-01-19 NOTE — PROGRESS NOTES
"  [facility]  Gynecology Visit    HPI:  Luis Gutierrez is a 39yo nonbinary individual who presents to discuss options for permanent sterilization. They are deciding between tubal ligation and hysterectomy. They are interested in pursuing a permanent option to avoid requiring other contraception or risk of pregnancy in the future. 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 They are also concerned about cost of each procedure. They have some disinterest in the change in their anatomy and concern about sexual function that would result from a hysterectomy. They have been on testosterone for approximately two months.     OBHx  OB History   No obstetric history on file.       Past Medical History:   Diagnosis Date     Hypertension 5/10/12    Preeclampsia       Past Surgical History:   Procedure Laterality Date      SECTION  2012       Current Outpatient Medications   Medication     needle, disp, 18G X 1\" MISC     Needle, Disp, 25G X 5/8\" MISC     syringe, disposable, (B-D SYRINGE LUER-COURTNEY) 1 ML MISC     testosterone cypionate (DEPOTESTOSTERONE) 200 MG/ML injection     No current facility-administered medications for this visit.         No Known Allergies      Social History     Tobacco Use     Smoking status: Former Smoker     Packs/day: 0.50     Years: 20.00     Pack years: 10.00     Types: Cigarettes     Start date: 2000     Quit date: 2020     Years since quittin.1     Smokeless tobacco: Never Used   Substance Use Topics     Alcohol use: Yes     Drug use: Yes     Types: Marijuana         Family History   Problem Relation Age of Onset     Hypertension Father      Substance Abuse Father      Cancer Maternal Grandfather 50        Bladder cancer. Smoker.     Heart Disease Maternal Grandfather      Asthma Daughter      Glaucoma No family hx of      Macular Degeneration No family hx of        ROS: 10-Point ROS negative except as noted in HPI    Physical " "Exam  /76   Pulse 64   Ht 1.626 m (5' 4\")   Wt 76.1 kg (167 lb 11.2 oz)   LMP 12/23/2020   BMI 28.79 kg/m    Gen: Well-appearing, NAD  HEENT: Normocephalic, atraumatic  CV:  Well perfused  Pulm: On room air, no labored breathing  Abd: Soft, non-tender, non-distended  Ext: No LE edema, extremities warm and well perfused      Assessment/Plan:  Luis Gutierrez is a 40 year old nonbinary individual here to discuss permanent sterilization. They are considering tubal ligation vs. Hysterectomy.     # Desire for sterilization  - Discussed routes of hysterectomy including vaginal, laparoscopic, and abdominal as well as relative benefits and risks of each approach. Recommended bilateral salpingectomy at the time of hysterectomy which has been shown to reduce the risk of ovarian cancer without significantly altering surgical risk regardless of operative approach. Recommended total laparoscopic hysterectomy (with or without robotic assistance) as the best approach in this patient.     - Discussed option for concurrent bilateral oophorectomy. Discussed potential benefits including decreased risk of ovarian and breast cancers. Discussed the paucity of quality research on long term effects of bilateral oophorectomy in trans masculine patients on testosterone. Discussed that bilateral oophorectomy prior to menopause in cisgender female patients induces surgical menopause and may increase risk of all cause mortality, cardiovascular disease, cognitive decline, and osteoporosis in this patient population, and therefore estrogen replacement therapy is recommended in these patients through age 60. Continuing exogenous testosterone therapy in trans masculine individuals somewhat decreases concern for complications of bilateral oophorectomy seen in cisgender females. Current research indicates that long term testosterone supplementation improves bone density in trans masculine patients and thus decreases risk for the primary " concern of osteopenia/osteoporosis.     - Patient is going to follow up with their insurance company about cost, as this may affect their decision about which procedure to pursue. Patient will contact the clinic after getting more information from their insurance company.     - Ordered pelvic US for surgical planning    Return to clinic PRN    Staffed with .      Neto Mauro MD  Obstetrics, Gynecology, and Women's Health  PGY1  January 19, 2021 , 4:16 PM      The Patient was seen in Resident Continuity Clinic by NETO MAURO.  I reviewed the history & exam. Assessment and plan were jointly made.    Xi Nolen MD

## 2021-02-06 ENCOUNTER — MYC REFILL (OUTPATIENT)
Dept: FAMILY MEDICINE | Facility: CLINIC | Age: 41
End: 2021-02-06

## 2021-02-06 DIAGNOSIS — F64.9 GENDER DYSPHORIA: ICD-10-CM

## 2021-02-08 RX ORDER — TESTOSTERONE CYPIONATE 200 MG/ML
40 INJECTION, SOLUTION INTRAMUSCULAR WEEKLY
Qty: 4 ML | Refills: 0 | Status: SHIPPED | OUTPATIENT
Start: 2021-02-08 | End: 2021-03-05

## 2021-02-08 NOTE — TELEPHONE ENCOUNTER

## 2021-02-10 ENCOUNTER — VIRTUAL VISIT (OUTPATIENT)
Dept: FAMILY MEDICINE | Facility: CLINIC | Age: 41
End: 2021-02-10
Payer: COMMERCIAL

## 2021-02-10 DIAGNOSIS — Z80.3 FAMILY HISTORY OF MALIGNANT NEOPLASM OF BREAST: ICD-10-CM

## 2021-02-10 DIAGNOSIS — F64.9 GENDER DYSPHORIA: Primary | ICD-10-CM

## 2021-02-10 PROCEDURE — 99214 OFFICE O/P EST MOD 30 MIN: CPT | Mod: 95 | Performed by: STUDENT IN AN ORGANIZED HEALTH CARE EDUCATION/TRAINING PROGRAM

## 2021-02-10 NOTE — PROGRESS NOTES
Preceptor Attestation:   Patient seen and evaluated via video visit. I discussed the patient with the resident. I have verified the content of the note, which accurately reflects my assessment of the patient and the plan of care.   Supervising Physician:  Tesfaye Lopez MD.

## 2021-02-10 NOTE — PROGRESS NOTES
Video start 3:49 PM   Video end: 4:00 PM   Provider location: [facility]  Patient location: Home  Video via [facility]      Assessment and Plan     Luis was seen today for recheck.    Diagnoses and all orders for this visit:    Gender dysphoria  They/them   HRT start date: 11/20/2020, going well.   Current T dose: 40 mg weekly   Last dose change: N/A   Will come in for lab-only visit on a Tues/Wednesday.  Pursuing hysterectomy vs tubal ligation - will obtain pelvic U/S soon and looking into insurance coverage of procedures.  -     Comprehensive Metabolic Panel ([facility]); Future  -     CBC with platelets; Future  -     Lipid Cascade ([facility]); Future  -     Testosterone total; Future    Family history of breast CA  Mom recently diagnosed at age 62 with triple negative breast CA. Also 2 aunts with hx breast CA. Discussed earlier screening mammo today. Reasonable to start at age 40. May be a candidate for genetic testing as well.   - Will discuss again in near future    Contraception:   N/A      Counselled patient about controlled substances    Follow up:  Follow up in 3 months.  Results by william  Questions were elicited and answered.         Gladis Ray MD           HPI     Luis is a 40 year old individual that uses pronouns They/Them/Their/Theirs that presents today for follow up of:  masculinizing hormone therapy.     Any special concerns today?    Patient presents with:  RECHECK: Follow up HRT    Day after injection, site is itchy. Doesn't last very long.   Not swollen, but kind of hard when touching for a day or two.   Shot on Friday.    Mom is 62, triple negative breast cancer. Will do chemo.  Mom's dad had bladder cancer.  2 of mom's aunts have had breast cancer.     On hormones?  YES +++ Shot day of the week? Friday      Due for labs?  Yes      +++ Refills of meds needed?  No    Gender affirmation is being sought in these other ways:  - Had GYN consult - hysterectomy vs tubal    ---    Past Surgical  "History:   Procedure Laterality Date      SECTION  2012       Patient Active Problem List   Diagnosis     Preeclampsia     HELLP syndrome     High-risk pregnancy supervision     Labor, failed, induction medical     Obesity (BMI 30-39.9)     S/P  section     Nicotine dependence, uncomplicated, unspecified nicotine product type     Depression     Receiving gender affirming care       Current Outpatient Medications   Medication Sig Dispense Refill     testosterone cypionate (DEPOTESTOSTERONE) 200 MG/ML injection Inject 0.2 mLs (40 mg) Subcutaneous once a week 4 mL 0     needle, disp, 18G X 1\" MISC Use to draw up hormones once weekly 25 each 1     Needle, Disp, 25G X 5/8\" MISC 1 each once a week 50 each 1     syringe, disposable, (B-D SYRINGE LUER-COURTNEY) 1 ML MISC Use to draw hormones weekly 25 each 1       History   Smoking Status     Former Smoker     Packs/day: 0.50     Years: 20.00     Types: Cigarettes     Start date: 2000     Quit date: 2020   Smokeless Tobacco     Never Used        No Known Allergies    Problem, Medication and Allergy Lists were reviewed and are current..         Review of Systems:   Acne a bit worse than normal  More hair on face, fuzzy.  Voice is feeling different  Not any muscle/weight change.        General    Fat redistribution: no    Weight change: no HEENT    Voice change: YES     Cardiovascular (CV)    Chest Pains: no    Shortness of breath: no Chest    Decreased exercise tolerance:  no    Breast changes/development: no     Gastrointestinal (GI)    Abdominal pain: no    Change in appetite: no Skin    Acne or oily skin: YES    Change in hair: YES     Genitourinary ()    Abnormal vaginal bleeding: no, still bleeding, last was end of January, a day shorter than normal.    Change in libido: yes initially, then smoothed out. Musculoskeletal    Leg pain or swelling: no     Psychiatric (Psych)    Depression: stable.     Anxiety/Panic: no    Mood:  Hard to track given " winter and COVID times. Feels pretty good.                     Physical Exam:   No vitals given video visit    Wt Readings from Last 10 Encounters:   01/19/21 76.1 kg (167 lb 11.2 oz)   12/18/20 74.9 kg (165 lb 3.2 oz)   11/17/20 74.1 kg (163 lb 6.4 oz)   11/09/20 72.6 kg (160 lb)   10/05/20 71 kg (156 lb 9.6 oz)       GENERAL:: healthy, alert and no distress  Affect: Appropriate/mood-congruent           Labs:   Ordered, pending

## 2021-02-15 DIAGNOSIS — F64.9 GENDER DYSPHORIA: ICD-10-CM

## 2021-02-15 LAB
ALBUMIN SERPL-MCNC: 4.1 MG/DL (ref 3.8–5)
ALP SERPL-CCNC: 46.2 U/L (ref 31.7–110.5)
ALT SERPL-CCNC: 17 U/L (ref 0–45)
AST SERPL-CCNC: 17.6 U/L (ref 0–45)
BILIRUB SERPL-MCNC: <0.4 MG/DL (ref 0.2–1.3)
BUN SERPL-MCNC: 12.1 MG/DL (ref 7–19)
CALCIUM SERPL-MCNC: 8.8 MG/DL (ref 8.5–10.1)
CHLORIDE SERPLBLD-SCNC: 101.1 MMOL/L (ref 98–110)
CHOLEST SERPL-MCNC: 235.9 MG/DL (ref 0–200)
CHOLEST/HDLC SERPL: 3.1 {RATIO} (ref 0–5)
CO2 SERPL-SCNC: 27.5 MMOL/L (ref 20–32)
CREAT SERPL-MCNC: 0.9 MG/DL (ref 0.5–1)
ERYTHROCYTE [DISTWIDTH] IN BLOOD BY AUTOMATED COUNT: 12.3 % (ref 10–15)
GFR SERPL CREATININE-BSD FRML MDRD: 71.8 ML/MIN/1.7 M2
GLUCOSE SERPL-MCNC: 108.7 MG'DL (ref 70–99)
HCT VFR BLD AUTO: 43 % (ref 35–47)
HDLC SERPL-MCNC: 75.1 MG/DL
HGB BLD-MCNC: 14 G/DL (ref 11.7–15.7)
LDLC SERPL CALC-MCNC: 148 MG/DL (ref 0–129)
MCH RBC QN AUTO: 29.7 PG (ref 26.5–33)
MCHC RBC AUTO-ENTMCNC: 32.6 G/DL (ref 31.5–36.5)
MCV RBC AUTO: 91 FL (ref 78–100)
PLATELET # BLD AUTO: 271 10E9/L (ref 150–450)
POTASSIUM SERPL-SCNC: 3.8 MMOL/L (ref 3.3–4.5)
PROT SERPL-MCNC: 6.3 G/DL (ref 6.8–8.8)
RBC # BLD AUTO: 4.72 10E12/L (ref 3.8–5.2)
SODIUM SERPL-SCNC: 135.9 MMOL/L (ref 132.6–141.4)
TRIGL SERPL-MCNC: 61.9 MG/DL (ref 0–150)
VLDL CHOLESTEROL: 12.4 MG/DL (ref 7–32)
WBC # BLD AUTO: 7.2 10E9/L (ref 4–11)

## 2021-02-15 PROCEDURE — 36415 COLL VENOUS BLD VENIPUNCTURE: CPT

## 2021-02-15 PROCEDURE — 99000 SPECIMEN HANDLING OFFICE-LAB: CPT | Performed by: FAMILY MEDICINE

## 2021-02-15 PROCEDURE — 80061 LIPID PANEL: CPT

## 2021-02-15 PROCEDURE — 80053 COMPREHEN METABOLIC PANEL: CPT

## 2021-02-15 PROCEDURE — 84403 ASSAY OF TOTAL TESTOSTERONE: CPT | Performed by: FAMILY MEDICINE

## 2021-02-15 PROCEDURE — 85027 COMPLETE CBC AUTOMATED: CPT | Performed by: FAMILY MEDICINE

## 2021-02-18 LAB — TESTOST SERPL-MCNC: 662 NG/DL (ref 8–60)

## 2021-03-02 ENCOUNTER — MYC MEDICAL ADVICE (OUTPATIENT)
Dept: FAMILY MEDICINE | Facility: CLINIC | Age: 41
End: 2021-03-02

## 2021-03-02 DIAGNOSIS — F64.9 GENDER DYSPHORIA: Primary | ICD-10-CM

## 2021-03-05 ENCOUNTER — MYC REFILL (OUTPATIENT)
Dept: FAMILY MEDICINE | Facility: CLINIC | Age: 41
End: 2021-03-05

## 2021-03-05 DIAGNOSIS — F64.9 GENDER DYSPHORIA: ICD-10-CM

## 2021-03-08 RX ORDER — TESTOSTERONE CYPIONATE 200 MG/ML
40 INJECTION, SOLUTION INTRAMUSCULAR WEEKLY
Qty: 4 ML | Refills: 0 | Status: SHIPPED | OUTPATIENT
Start: 2021-03-08 | End: 2021-04-05

## 2021-03-18 ENCOUNTER — TELEPHONE (OUTPATIENT)
Dept: PLASTIC SURGERY | Facility: CLINIC | Age: 41
End: 2021-03-18

## 2021-04-05 ENCOUNTER — MYC REFILL (OUTPATIENT)
Dept: FAMILY MEDICINE | Facility: CLINIC | Age: 41
End: 2021-04-05

## 2021-04-05 ENCOUNTER — MYC MEDICAL ADVICE (OUTPATIENT)
Dept: FAMILY MEDICINE | Facility: CLINIC | Age: 41
End: 2021-04-05

## 2021-04-05 DIAGNOSIS — F64.9 GENDER DYSPHORIA: ICD-10-CM

## 2021-04-05 RX ORDER — SYRINGE, DISPOSABLE, 1 ML
SYRINGE, EMPTY DISPOSABLE MISCELLANEOUS
Qty: 25 EACH | Refills: 1 | Status: SHIPPED | OUTPATIENT
Start: 2021-04-05 | End: 2021-04-14

## 2021-04-05 NOTE — TELEPHONE ENCOUNTER
"Patient requesting refill of testosterone via mychart. Last office visit 2/10/21 with Dr. Ray, last filled 3/5/21. RN unable to fill as it is a controlled substance. Routing to PCP to order if appropriate.   Daisy Ritchie RN    Request for medication refill:    Providers if patient needs an appointment and you are willing to give a one month supply please refill for one month and  send a letter/MyChart using \".SMILLIMITEDREFILL\" .smillimited and route chart to \"P SMI \" (Giving one month refill in non controlled medications is strongly recommended before denial)    If refill has been denied, meaning absolutely no refills without visit, please complete the smart phrase \".smirxrefuse\" and route it to the \"P SMI MED REFILLS\"  pool to inform the patient and the pharmacy.          "

## 2021-04-05 NOTE — TELEPHONE ENCOUNTER
Patient requested refill for needles and syringes. Patient last office visit 12/18/20. RN refilled per protocol and sent to preferred pharmacy.    Sarmad Gtz RN

## 2021-04-07 RX ORDER — TESTOSTERONE CYPIONATE 200 MG/ML
40 INJECTION, SOLUTION INTRAMUSCULAR WEEKLY
Qty: 4 ML | Refills: 0 | Status: SHIPPED | OUTPATIENT
Start: 2021-04-07 | End: 2021-04-14

## 2021-04-14 RX ORDER — TESTOSTERONE CYPIONATE 200 MG/ML
40 INJECTION, SOLUTION INTRAMUSCULAR WEEKLY
Qty: 4 ML | Refills: 0 | Status: SHIPPED | OUTPATIENT
Start: 2021-04-14 | End: 2021-06-07

## 2021-04-14 RX ORDER — SYRINGE, DISPOSABLE, 1 ML
SYRINGE, EMPTY DISPOSABLE MISCELLANEOUS
Qty: 25 EACH | Refills: 1 | Status: SHIPPED | OUTPATIENT
Start: 2021-04-14 | End: 2021-12-21

## 2021-04-14 NOTE — TELEPHONE ENCOUNTER
Pt requested all meds to be sent to [facility] as their pharmacy is now closed. Routing to PCP as RN unable to reorder testosterone.     Sarmad Gtz RN

## 2021-04-19 ENCOUNTER — OFFICE VISIT (OUTPATIENT)
Dept: FAMILY MEDICINE | Facility: CLINIC | Age: 41
End: 2021-04-19
Payer: COMMERCIAL

## 2021-04-19 VITALS
HEART RATE: 79 BPM | SYSTOLIC BLOOD PRESSURE: 114 MMHG | HEIGHT: 64 IN | DIASTOLIC BLOOD PRESSURE: 82 MMHG | WEIGHT: 163 LBS | RESPIRATION RATE: 12 BRPM | TEMPERATURE: 98.3 F | BODY MASS INDEX: 27.83 KG/M2 | OXYGEN SATURATION: 99 %

## 2021-04-19 DIAGNOSIS — R31.0 GROSS HEMATURIA: ICD-10-CM

## 2021-04-19 DIAGNOSIS — R82.998 DARK BROWN-COLORED URINE: Primary | ICD-10-CM

## 2021-04-19 LAB
ALBUMIN SERPL-MCNC: 3.3 G/DL (ref 3.4–5)
ALP SERPL-CCNC: 50 U/L (ref 40–150)
ALT SERPL W P-5'-P-CCNC: 21 U/L (ref 0–50)
ANION GAP SERPL CALCULATED.3IONS-SCNC: 6 MMOL/L (ref 3–14)
AST SERPL W P-5'-P-CCNC: 18 U/L (ref 0–45)
BACTERIA: NORMAL
BILIRUB SERPL-MCNC: 0.3 MG/DL (ref 0.2–1.3)
BILIRUBIN UR: NEGATIVE MG/DL
BLOOD UR: ABNORMAL MG/DL
BUN SERPL-MCNC: 12 MG/DL (ref 7–30)
CALCIUM SERPL-MCNC: 8.9 MG/DL (ref 8.5–10.1)
CASTS: NORMAL /LPF
CHLORIDE SERPL-SCNC: 106 MMOL/L (ref 94–109)
CK SERPL-CCNC: 152 U/L (ref 30–225)
CO2 SERPL-SCNC: 25 MMOL/L (ref 20–32)
CREAT SERPL-MCNC: 0.77 MG/DL (ref 0.52–1.04)
CRYSTAL URINE: NORMAL /LPF
EPITHELIAL CELLS UR: <2 /LPF (ref 0–2)
ERYTHROCYTE [DISTWIDTH] IN BLOOD BY AUTOMATED COUNT: 12.2 % (ref 10–15)
GFR SERPL CREATININE-BSD FRML MDRD: >90 ML/MIN/{1.73_M2}
GLUCOSE SERPL-MCNC: 86 MG/DL (ref 70–99)
GLUCOSE URINE: NEGATIVE
HCT VFR BLD AUTO: 43.1 % (ref 35–47)
HEMOGLOBIN: 14.2 G/DL (ref 11.7–15.7)
KETONES UR QL: NEGATIVE MG/DL
LEUKOCYTE ESTERASE UR: NEGATIVE
MCH RBC QN AUTO: 29.6 PG (ref 26.5–35)
MCHC RBC AUTO-ENTMCNC: 32.9 G/DL (ref 32–36)
MCV RBC AUTO: 89.8 FL (ref 78–100)
MUCOUS URINE: NORMAL LPF
NITRITE UR QL STRIP: NEGATIVE MG/DL
PH UR STRIP: 7 [PH] (ref 4.5–8)
PLATELET # BLD AUTO: 249 10E9/L (ref 150–450)
POTASSIUM SERPL-SCNC: 4 MMOL/L (ref 3.4–5.3)
PROT SERPL-MCNC: 6.4 G/DL (ref 6.8–8.8)
PROTEIN UR: ABNORMAL MG/DL
RBC # BLD AUTO: 4.8 10E12/L (ref 3.8–5.2)
RBC URINE: NORMAL /HPF
SODIUM SERPL-SCNC: 138 MMOL/L (ref 133–144)
SP GR UR STRIP: 1.02 (ref 1–1.03)
UROBILINOGEN UR STRIP-ACNC: ABNORMAL E.U./DL
WBC # BLD AUTO: 4.7 10E9/L (ref 4–11)
WBC URINE: NORMAL /HPF

## 2021-04-19 PROCEDURE — 82550 ASSAY OF CK (CPK): CPT | Performed by: FAMILY MEDICINE

## 2021-04-19 PROCEDURE — 85027 COMPLETE CBC AUTOMATED: CPT | Performed by: FAMILY MEDICINE

## 2021-04-19 PROCEDURE — 36415 COLL VENOUS BLD VENIPUNCTURE: CPT | Performed by: FAMILY MEDICINE

## 2021-04-19 PROCEDURE — 81001 URINALYSIS AUTO W/SCOPE: CPT | Performed by: FAMILY MEDICINE

## 2021-04-19 PROCEDURE — 80053 COMPREHEN METABOLIC PANEL: CPT | Performed by: FAMILY MEDICINE

## 2021-04-19 PROCEDURE — 99214 OFFICE O/P EST MOD 30 MIN: CPT | Performed by: FAMILY MEDICINE

## 2021-04-19 ASSESSMENT — MIFFLIN-ST. JEOR: SCORE: 1391.42

## 2021-04-19 NOTE — PROGRESS NOTES
"    Assessment & Plan     Dark brown-colored urine  Hematuria  Patient presenting with 36 hours of dark brown urine. UA shows blood and protein. Awaiting micro results as well as results of CK, CMP, CBC.    Seems most likely to be glomerular in nature, no recent illness and currently is asymptomatic.No high risk medications or supplements that could cause this.    Plan to be in touch with patient via MyChart when results return to help narrow the etiology.   Advised hydration and to avoid ibuprofen for now (tylenol fine) if needed.     - Urinalysis, Micro If (UA) ([facility])  - CBC with Plt ([facility])  - Comprehensive metabolic panel  - Urine Microscopic (UA) ([facility])  - CK total     Tobacco Cessation:   reports that Luis Gutierrez has been smoking cigarettes. Luis Gutierrez started smoking about 20 years ago. Luis Gutierrez has a 3.00 pack-year smoking history. Luis Gutierrez has never used smokeless tobacco.  Not discussed today.     BMI:   Estimated body mass index is 28.42 kg/m  as calculated from the following:    Height as of this encounter: 1.613 m (5' 3.5\").    Weight as of this encounter: 73.9 kg (163 lb).   Not discussed today.       No follow-ups on file.    DO [facility] DEBBY Smith is a 40 year old who presents for the following health issues     HPI     Brown urine since Sunday.   Stayed that way until this morning.   Hydrating self.   No history of similar.     Moderna dose #2 on Saturday.     No urinary frequency, urgency.   Some fatigue.   Some body aches, improving.   No sore throat, no rashes, no abd pain. No flank pain.   Took 400mg ibuprofen on Saturday night. No other ibuprofen use.     Intermittent cigarette use. Rare EtoH use.   Marcum.   No recent change/increase in physical activity.     Supplements:   Mother wort     Mom's dad had bladder cancer.   Mom's sister has MS.   Mom's cousin with lupus.     Review of Systems   Constitutional, HEENT, " "cardiovascular, pulmonary, gi and gu systems are negative, except as otherwise noted.      Objective    /82   Pulse 79   Temp 98.3  F (36.8  C) (Oral)   Resp 12   Ht 1.613 m (5' 3.5\")   Wt 73.9 kg (163 lb)   LMP 03/22/2021 (Exact Date)   SpO2 99%   Breastfeeding No   BMI 28.42 kg/m    Body mass index is 28.42 kg/m .  Physical Exam  Constitutional:       Appearance: uLis Gutierrez is well-developed.   HENT:      Head: Normocephalic and atraumatic.      Nose: Nose normal.      Mouth/Throat:      Mouth: Mucous membranes are moist.      Pharynx: Oropharynx is clear.   Eyes:      General: No scleral icterus.     Conjunctiva/sclera: Conjunctivae normal.   Neck:      Musculoskeletal: Neck supple.   Pulmonary:      Effort: Pulmonary effort is normal.   Musculoskeletal: Normal range of motion.   Skin:     General: Skin is warm and dry.   Neurological:      Mental Status: Luis Gutierrez is alert and oriented to person, place, and time.   Psychiatric:         Behavior: Behavior normal.         Thought Content: Thought content normal.              "

## 2021-04-20 ENCOUNTER — HOSPITAL ENCOUNTER (OUTPATIENT)
Dept: CT IMAGING | Facility: CLINIC | Age: 41
Discharge: HOME | End: 2021-04-20
Attending: FAMILY MEDICINE | Admitting: FAMILY MEDICINE
Payer: COMMERCIAL

## 2021-04-20 DIAGNOSIS — R31.0 GROSS HEMATURIA: ICD-10-CM

## 2021-04-20 DIAGNOSIS — R82.998 DARK BROWN-COLORED URINE: ICD-10-CM

## 2021-04-20 PROCEDURE — 250N000011 HC RX IP 250 OP 636: Performed by: FAMILY MEDICINE

## 2021-04-20 PROCEDURE — 74178 CT ABD&PLV WO CNTR FLWD CNTR: CPT

## 2021-04-20 PROCEDURE — 87086 URINE CULTURE/COLONY COUNT: CPT | Performed by: FAMILY MEDICINE

## 2021-04-20 PROCEDURE — 250N000009 HC RX 250: Performed by: FAMILY MEDICINE

## 2021-04-20 PROCEDURE — 74178 CT ABD&PLV WO CNTR FLWD CNTR: CPT | Mod: 26 | Performed by: RADIOLOGY

## 2021-04-20 RX ORDER — IOPAMIDOL 755 MG/ML
100 INJECTION, SOLUTION INTRAVASCULAR ONCE
Status: COMPLETED | OUTPATIENT
Start: 2021-04-20 | End: 2021-04-20

## 2021-04-20 RX ADMIN — IOPAMIDOL 120 ML: 755 INJECTION, SOLUTION INTRAVENOUS at 09:00

## 2021-04-20 RX ADMIN — SODIUM CHLORIDE 125 ML: 9 INJECTION, SOLUTION INTRAVENOUS at 09:01

## 2021-04-21 LAB
BACTERIA SPEC CULT: NO GROWTH
Lab: NORMAL
SPECIMEN SOURCE: NORMAL

## 2021-05-21 ENCOUNTER — OFFICE VISIT (OUTPATIENT)
Dept: FAMILY MEDICINE | Facility: CLINIC | Age: 41
End: 2021-05-21
Payer: COMMERCIAL

## 2021-05-21 VITALS
HEART RATE: 75 BPM | TEMPERATURE: 98.2 F | OXYGEN SATURATION: 98 % | DIASTOLIC BLOOD PRESSURE: 80 MMHG | SYSTOLIC BLOOD PRESSURE: 118 MMHG

## 2021-05-21 DIAGNOSIS — R07.0 THROAT PAIN: Primary | ICD-10-CM

## 2021-05-21 DIAGNOSIS — Z20.822 SUSPECTED 2019 NOVEL CORONAVIRUS INFECTION: ICD-10-CM

## 2021-05-21 LAB
SARS-COV-2 RNA RESP QL NAA+PROBE: NORMAL
SPECIMEN SOURCE: NORMAL

## 2021-05-21 PROCEDURE — U0005 INFEC AGEN DETEC AMPLI PROBE: HCPCS | Performed by: FAMILY MEDICINE

## 2021-05-21 PROCEDURE — 99213 OFFICE O/P EST LOW 20 MIN: CPT | Mod: CS | Performed by: STUDENT IN AN ORGANIZED HEALTH CARE EDUCATION/TRAINING PROGRAM

## 2021-05-21 PROCEDURE — U0003 INFECTIOUS AGENT DETECTION BY NUCLEIC ACID (DNA OR RNA); SEVERE ACUTE RESPIRATORY SYNDROME CORONAVIRUS 2 (SARS-COV-2) (CORONAVIRUS DISEASE [COVID-19]), AMPLIFIED PROBE TECHNIQUE, MAKING USE OF HIGH THROUGHPUT TECHNOLOGIES AS DESCRIBED BY CMS-2020-01-R: HCPCS | Performed by: FAMILY MEDICINE

## 2021-05-21 NOTE — PROGRESS NOTES
Assessment & Plan     Throat pain  Suspected 2019 novel coronavirus infection  40 year old COVID-vaccinated smoker presenting with congestion, sore throat, cough. Clinical signs and symptoms consistent with viral URI, seasonal allergies, among others. COVID19 less likely given fully immunized, though does have frequent contacts at work with folks who are not immunized and a child who is in . Lungs are clear and vitals are normal in the office today. No indications for imaging or further evaluation at this time. Symptomatic management discussed.   - Symptomatic COVID-19 Virus (Coronavirus) by PCR     Tobacco Cessation:   reports that Luis Gutierrez has been smoking cigarettes. Luis Gutierrez started smoking about 20 years ago. Luis Gutierrez has a 3.00 pack-year smoking history. Luis Gutierrez has never used smokeless tobacco.  Tobacco Cessation Action Plan: Information offered: Patient not interested at this time    No follow-ups on file.    Diana Bach MD  [facility] DEBBY Smith is a 40 year old [id number]-zeiphnukwu smoker who presents for the following health issues    HPI     4 days ago congested, itchy, attributed to allergies   Next day sore throat, hoarse  Coughing up phlegm- mostly clear, light yellow  No fevers/chills  Headache, earlier this week was feeling congested  Took two ibuprofen earlier   Child ill with similar symptoms, in school   Had covid last May      Concern for COVID-19  About how many days ago did these symptoms start? 4 days ago  Is this your first visit for this illness? Yes  In the 14 days before your symptoms started, have you had close contact with someone with COVID-19 (Coronavirus)? No  Do you have a fever or chills? No  Are you having new or worsening difficulty breathing? No  Do you have new or worsening cough? Yes, I am coughing up mucus.  Have you had any new or unexplained body aches? No    Have you experienced any of the  following NEW symptoms?    Headache: YES    Sore throat: YES    Loss of taste or smell: No    Chest pain: No    Diarrhea: No    Rash: No  What treatments have you tried? ibuprofen  Are you, or a household member, a healthcare worker or a ? No  Do you live in a nursing home, group home, or shelter? No  Do you have a way to get food/medications if quarantined? Yes, I have a friend or family member who can help me.    Had COVID19 in May 2020, fully vaccinated 4/17/2021    Review of Systems    ROS: 10 point ROS neg other than the symptoms noted above in the HPI.       Objective    /80 (BP Location: Left arm, Patient Position: Sitting, Cuff Size: Adult Regular)   Pulse 75   Temp 98.2  F (36.8  C) (Oral)   SpO2 98%   There is no height or weight on file to calculate BMI.  Physical Exam  Constitutional:       General: Luis Gutierrez is not in acute distress.     Appearance: Luis Gutierrez is well-developed. Luis Gutierrez is not diaphoretic.   Cardiovascular:      Rate and Rhythm: Normal rate and regular rhythm.   Pulmonary:      Effort: Pulmonary effort is normal. No respiratory distress.      Breath sounds: Normal breath sounds. No wheezing, rhonchi or rales.   Abdominal:      Palpations: Abdomen is soft.   Skin:     General: Skin is warm.      Coloration: Skin is not jaundiced.      Findings: No rash.   Neurological:      General: No focal deficit present.      Mental Status: Luis Gutierrez is alert.   Psychiatric:         Mood and Affect: Mood normal.          Results for orders placed or performed in visit on 05/21/21   Symptomatic COVID-19 Virus (Coronavirus) by PCR     Status: None    Specimen: Nasopharyngeal   Result Value Ref Range    COVID-19 Virus PCR to [facility] - Source Nasopharyngeal     COVID-19 Virus PCR to [facility] - Result       Test received-See reflex to [facility] test SARS CoV2 (COVID-19) Virus RT-PCR   SARS-CoV-2 COVID-19 Virus (Coronavirus) by PCR     Status: None    Specimen:  Nasopharyngeal   Result Value Ref Range    SARS-CoV-2 Virus Specimen Source Nasopharyngeal     SARS-CoV-2 PCR Result NEGATIVE     SARS-CoV-2 PCR Comment       Testing was performed using the stefania SARS-CoV-2 assay on the stefania [employer] System.2

## 2021-05-21 NOTE — PROGRESS NOTES
Preceptor Attestation:   Patient seen and discussed with the resident. Assessment and plan reviewed with resident and agreed upon.   Supervising Physician:  DO [facility]

## 2021-05-22 LAB
LABORATORY COMMENT REPORT: NORMAL
SARS-COV-2 RNA RESP QL NAA+PROBE: NEGATIVE
SPECIMEN SOURCE: NORMAL

## 2021-05-27 NOTE — TELEPHONE ENCOUNTER
FUTURE VISIT INFORMATION      FUTURE VISIT INFORMATION:    Date: 8/24/21    Time: 3:00pm    Location: [facility]  REFERRAL INFORMATION:    Referring provider:  self    Referring providers clinic:  N/A    Reason for visit/diagnosis  top consult    RECORDS REQUESTED FROM:       No recs to collect

## 2021-06-07 ENCOUNTER — OFFICE VISIT (OUTPATIENT)
Dept: FAMILY MEDICINE | Facility: CLINIC | Age: 41
End: 2021-06-07
Payer: COMMERCIAL

## 2021-06-07 VITALS
WEIGHT: 159.2 LBS | SYSTOLIC BLOOD PRESSURE: 116 MMHG | HEART RATE: 72 BPM | RESPIRATION RATE: 16 BRPM | OXYGEN SATURATION: 98 % | HEIGHT: 64 IN | BODY MASS INDEX: 27.18 KG/M2 | DIASTOLIC BLOOD PRESSURE: 74 MMHG | TEMPERATURE: 98.2 F

## 2021-06-07 DIAGNOSIS — F64.9 GENDER DYSPHORIA: Primary | ICD-10-CM

## 2021-06-07 DIAGNOSIS — E78.5 DYSLIPIDEMIA: ICD-10-CM

## 2021-06-07 DIAGNOSIS — F64.0 GENDER DYSPHORIA IN ADOLESCENT AND ADULT: ICD-10-CM

## 2021-06-07 DIAGNOSIS — R31.0 GROSS HEMATURIA: ICD-10-CM

## 2021-06-07 DIAGNOSIS — L70.0 ACNE VULGARIS: ICD-10-CM

## 2021-06-07 LAB
BACTERIA: NORMAL
BILIRUBIN UR: NEGATIVE MG/DL
BLOOD UR: ABNORMAL MG/DL
CASTS: NORMAL /LPF
CHOLEST SERPL-MCNC: 210 MG/DL
CRYSTAL URINE: NORMAL /LPF
EPITHELIAL CELLS UR: NORMAL /LPF (ref 0–2)
ESTRADIOL SERPL-MCNC: 123 PG/ML
GLUCOSE URINE: NEGATIVE
HDLC SERPL-MCNC: 75 MG/DL
KETONES UR QL: NEGATIVE MG/DL
LDLC SERPL CALC-MCNC: 122 MG/DL
LEUKOCYTE ESTERASE UR: NEGATIVE
MUCOUS URINE: NORMAL LPF
NITRITE UR QL STRIP: NEGATIVE MG/DL
NONHDLC SERPL-MCNC: 136 MG/DL
PH UR STRIP: 7 [PH] (ref 4.5–8)
PROTEIN UR: ABNORMAL MG/DL
RBC URINE: NORMAL /HPF
SP GR UR STRIP: 1.01 (ref 1–1.03)
TRIGL SERPL-MCNC: 69 MG/DL
UROBILINOGEN UR STRIP-ACNC: ABNORMAL E.U./DL
WBC URINE: NORMAL /HPF

## 2021-06-07 PROCEDURE — 84403 ASSAY OF TOTAL TESTOSTERONE: CPT | Performed by: STUDENT IN AN ORGANIZED HEALTH CARE EDUCATION/TRAINING PROGRAM

## 2021-06-07 PROCEDURE — 99214 OFFICE O/P EST MOD 30 MIN: CPT | Mod: GC | Performed by: STUDENT IN AN ORGANIZED HEALTH CARE EDUCATION/TRAINING PROGRAM

## 2021-06-07 PROCEDURE — 80061 LIPID PANEL: CPT | Performed by: STUDENT IN AN ORGANIZED HEALTH CARE EDUCATION/TRAINING PROGRAM

## 2021-06-07 PROCEDURE — 82670 ASSAY OF TOTAL ESTRADIOL: CPT | Performed by: STUDENT IN AN ORGANIZED HEALTH CARE EDUCATION/TRAINING PROGRAM

## 2021-06-07 PROCEDURE — 80048 BASIC METABOLIC PNL TOTAL CA: CPT | Performed by: STUDENT IN AN ORGANIZED HEALTH CARE EDUCATION/TRAINING PROGRAM

## 2021-06-07 PROCEDURE — 36415 COLL VENOUS BLD VENIPUNCTURE: CPT | Performed by: STUDENT IN AN ORGANIZED HEALTH CARE EDUCATION/TRAINING PROGRAM

## 2021-06-07 PROCEDURE — 81001 URINALYSIS AUTO W/SCOPE: CPT | Performed by: STUDENT IN AN ORGANIZED HEALTH CARE EDUCATION/TRAINING PROGRAM

## 2021-06-07 RX ORDER — TESTOSTERONE CYPIONATE 200 MG/ML
60 INJECTION, SOLUTION INTRAMUSCULAR WEEKLY
Qty: 4 ML | Refills: 1 | Status: SHIPPED | OUTPATIENT
Start: 2021-06-07 | End: 2021-06-07

## 2021-06-07 RX ORDER — TESTOSTERONE CYPIONATE 200 MG/ML
60 INJECTION, SOLUTION INTRAMUSCULAR WEEKLY
Qty: 4 ML | Refills: 1 | Status: SHIPPED | OUTPATIENT
Start: 2021-06-07 | End: 2021-10-05

## 2021-06-07 ASSESSMENT — MIFFLIN-ST. JEOR: SCORE: 1374.26

## 2021-06-07 NOTE — PROGRESS NOTES
"    Assessment & Plan     Gender dysphoria  Dyslipidemia  They/them   HRT start date: 11/20/2020   Prior T dose: 40 mg weekly   Last dose change: 06/07/21 (today) to 60 mg  Repeat lipid panel today - dietary changes implemented since last labs 2/2021 with elevated TC and LDL.  Ongoing menses - unusual for length of time on HRT. Will repeat hormone draws today. Will also increase T dose from 40 mg to 60 mg.   Pursuing top surgery with Daina.  Next visit in 3 months - with labs.  - Lipid panel  - Testosterone total  - Estradiol  - Needle, Disp, 25G X 5/8\" MISC  Dispense: 50 each; Refill: 1  - testosterone cypionate (DEPOTESTOSTERONE) 200 MG/ML injection  Dispense: 4 mL; Refill: 1    Gross hematuria  Hematuria s/p last COVID-19 vaccine - UA at that time  RBCs. Symptoms since resolved.  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   Will repeat UA today.  - Urinalysis, Micro If ([facility])        Return in about 3 months (around 9/7/2021).    Gladis Ray MD  [facility] DEBBY Smith is a 40 year old who presents for the following health issues     HPI     Luis is a 40 year old individual that uses pronouns He/Him/His/Himself and They/Them/Their/Theirs that presents today for follow up of:  masculinizing hormone therapy.     Any special concerns today?  no current concerns    On hormones?  YES +++ Shot day of the week? Friday      Due for labs?  Yes      +++ Refills of meds needed?  No      T going well  Acne worse - around ovulation time.  Still getting period. Feels like PMS symptoms lessened.   Last period was maybe a day shorter, but otherwise unchanged. Now onset more rapid.  Genital changes  More body hair    Cheeks temples chin acne. Just started using some wash.    Gender affirmation is being sought in these other ways:     Daina consult in August for top surgery.    Reviewed hematuria  Hematuria after COVID shot.    Review of Systems " "  Constitutional, HEENT, cardiovascular, pulmonary, gi and gu systems are negative, except as otherwise noted.      Objective    /74   Pulse 72   Temp 98.2  F (36.8  C) (Oral)   Resp 16   Ht 1.613 m (5' 3.5\")   Wt 72.2 kg (159 lb 3.2 oz)   SpO2 98%   BMI 27.76 kg/m    Body mass index is 27.76 kg/m .  Physical Exam  Constitutional:       General: Luis Gutierrez is not in acute distress.     Appearance: Luis Gutierrez is well-developed. Luis Gutierrez is not diaphoretic.   HENT:      Head: Normocephalic and atraumatic.   Eyes:      Conjunctiva/sclera: Conjunctivae normal.   Neck:      Musculoskeletal: Normal range of motion.   Cardiovascular:      Rate and Rhythm: Normal rate.   Pulmonary:      Effort: Pulmonary effort is normal. No respiratory distress.   Musculoskeletal: Normal range of motion.   Skin:     Comments: Mild acne over cheeks, chin   Neurological:      General: No focal deficit present.      Mental Status: Luis Gutierrez is alert.            Reviewed CT urogram with patient    ----- Service Performed and Documented by Resident or Fellow ------                "

## 2021-06-07 NOTE — PATIENT INSTRUCTIONS
Next follow-up in 3 months - with labs    Increase dose to 60 mg of T.  Hopefully you'll stop bleeding with this.

## 2021-06-09 ENCOUNTER — TELEPHONE (OUTPATIENT)
Dept: UROLOGY | Facility: CLINIC | Age: 41
End: 2021-06-09

## 2021-06-09 LAB
ANION GAP SERPL CALCULATED.3IONS-SCNC: 8 MMOL/L (ref 3–14)
BUN SERPL-MCNC: 14 MG/DL (ref 7–30)
CALCIUM SERPL-MCNC: 8.7 MG/DL (ref 8.5–10.1)
CHLORIDE SERPL-SCNC: 106 MMOL/L (ref 94–109)
CO2 SERPL-SCNC: 23 MMOL/L (ref 20–32)
CREAT SERPL-MCNC: 0.91 MG/DL (ref 0.52–1.04)
GFR SERPL CREATININE-BSD FRML MDRD: 79 ML/MIN/{1.73_M2}
GLUCOSE SERPL-MCNC: 75 MG/DL (ref 70–99)
POTASSIUM SERPL-SCNC: 3.8 MMOL/L (ref 3.4–5.3)
SODIUM SERPL-SCNC: 137 MMOL/L (ref 133–144)
TESTOST SERPL-MCNC: 905 NG/DL (ref 8–60)

## 2021-06-18 ENCOUNTER — PRE VISIT (OUTPATIENT)
Dept: UROLOGY | Facility: CLINIC | Age: 41
End: 2021-06-18

## 2021-06-24 ENCOUNTER — PRE VISIT (OUTPATIENT)
Dept: UROLOGY | Facility: CLINIC | Age: 41
End: 2021-06-24

## 2021-06-24 NOTE — TELEPHONE ENCOUNTER
Reason for visit: Consult     Relevant information: gross hematuria    Records/imaging/labs/orders: in EPIC    Pt called: no    At Rooming: normal

## 2021-06-29 ENCOUNTER — VIRTUAL VISIT (OUTPATIENT)
Dept: UROLOGY | Facility: CLINIC | Age: 41
End: 2021-06-29
Payer: COMMERCIAL

## 2021-06-29 DIAGNOSIS — R31.29 MICROHEMATURIA: Primary | ICD-10-CM

## 2021-06-29 PROCEDURE — 99203 OFFICE O/P NEW LOW 30 MIN: CPT | Mod: GT | Performed by: PHYSICIAN ASSISTANT

## 2021-06-29 NOTE — PROGRESS NOTES
Luis is a 40 year old who is being evaluated via a billable video visit.      How would you like to obtain your AVS? MyChart  If the video visit is dropped, the invitation should be resent by: Send to e-mail at: [email or web address]  Will anyone else be joining your video visit? No    Video Start Time: 3:14  Video-Visit Details    Type of service:  Video Visit    Video End Time:3:34    Originating Location (pt. Location): Home    Distant Location (provider location):  [facility]     Platform used for Video Visit: [employer]   Video duration: 30 minutes (3:14 - 3:34 + 10 minutes documentation on DOS)

## 2021-06-29 NOTE — LETTER
2021       RE: Luis Gutierrez  [address]  [place] [address]     Dear Colleague,    Thank you for referring your patient, Luis Gutierrez, to the [facility] [place] at [facility]. Please see a copy of my visit note below.    It was my pleasure to meet Mr. Luis Gutierrez, a 40 year old year old adult seen in consultation today at the request of Gladis Ray MD for chief complaint: Consult (gross hematuria)    Today they are unaccompanied on video    HPI: Mr. Luis Gutierrez that uses pronouns They/Them/Their/Theirs.  He has PMH significant for gender dysphoria on testosterone supplementation as well as dyslipidemia and he was referred for gross hematuria. Considering hysterectomy BSO - has seen gynecology.  Not booked yet.     They had dark brown urine on 21, the day after getting their second COVID shot.  No other vaccine side effects.   Because of the appearance of the urine, saw a doctor the next day.      - Has never had the dark urine previously  - Not a runner.    - No injury.    - No stones.  No UTI  - No urinary symptoms  - No vaginal bleeding other than menstruation.    - Sexual activity - fisting may have been a contributing factor     REVIEW OF DIAGNOSTICS:   21 - sCr 0.91mg/dL  21 - UA: WBC none, RBC 10-25, epithelial cells (5-10)  21 - CT urogram: Normal with exception of duplicated left renal collecting system with very distal suspected  union of the ureters toward the ureterovesical junction.  21 - UA: WBC 2-5, RBC , epithelial cells <2.     Past Medical History:   Diagnosis Date     Hypertension 5/10/12    Preeclampsia       Past Surgical History:   Procedure Laterality Date      SECTION  2012       FAMILY HISTORY: Denies family history of urologic cancer.   Mom- breast cancer  Great aunts x 2 - breast cancer  Maternal grandfather- bladder cancer    SOCIAL HISTORY:   Works as a  ".  Sometimes lifts weights.  Used to be a cross-fit .    No occupational exposures. 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   Used to work in apparel   Has smoked on and off for ~19 years.       reports that Luis Gutierrez has been smoking cigarettes. Luis Gutierrez started smoking about 20 years ago. Luis Gutierrez has a 3.00 pack-year smoking history. Luis Gutierrez has never used smokeless tobacco.    Current Outpatient Medications   Medication Sig Dispense Refill     needle, disp, 18G X 1\" MISC Use to draw up hormones once weekly 25 each 1     Needle, Disp, 25G X 5/8\" MISC 1 each once a week 50 each 1     syringe, disposable, (B-D SYRINGE LUER-COURTNEY) 1 ML MISC Use to draw hormones weekly 25 each 1     testosterone cypionate (DEPOTESTOSTERONE) 200 MG/ML injection Inject 0.3 mLs (60 mg) Subcutaneous once a week 4 mL 1       ALLERGIES: Patient has no known allergies.      REVIEW OF SYSTEMS:  As above in HPI    GENERAL PHYSICAL EXAM:   Vitals: There were no vitals taken for this visit.  There is no height or weight on file to calculate BMI.    GENERAL: Well groomed, well developed, well nourished adult in NAD.  NEURO: Alert and oriented x 3.  PSYCH: Normal mood and affect, pleasant and agreeable during interview and exam.    RADIOLOGY: The following tests were reviewed:   EXAMINATION: CT UROGRAM WO & W CONTRAST, 4/20/2021 9:24 AM     TECHNIQUE:  Helical CT images from the lung bases through the  symphysis pubis were obtained  without and with contrast using CT  urogram protocol. Contrast dose: 120mL of isovue 370     COMPARISON: None     HISTORY: Hematuria, unknown cause; Dark brown-colored urine; Gross  hematuria     FINDINGS:     Urinary tract: No renal calculi identified. Right extrarenal pelvis is  demonstrated. No dilatation of the intrarenal collecting systems. No  enhancing renal masses. The opacified portions of the ureters are  within normal limits. Duplicated left " renal collecting system  demonstrated. Ureters distally are not well opacified but appear to be  joining in the region of or just at the ureterovesical insertion,  series 9 image 312-330. The bladder is not well-opacified on the  delayed imaging with contrast mixing in the posterior aspect. No  definite large pleural-based mass identified.     Remainder of the abdomen and pelvis: On noncontrast imaging, a small  calcified granuloma in the medial right hepatic lobe, series 6 image  103 is present. Otherwise the liver is unremarkable. The pancreas,  spleen and adrenals are within normal limits. Gallbladder is  collapsed. No biliary dilatation. Bowel gas pattern nonobstructed.  Scattered stool throughout the colon. Normal appendix. There is some  stasis in the distal small bowel loops with small bowel feces sign  noted. No ascites or free air. In the pelvis, uterus and ovaries are  within normal limits for patient's age with follicles in the left  noted and likely a dominant follicle on the right.     Lung bases:  Minor basilar atelectasis. Lung bases are otherwise  clear. No pleural effusions.     Bones and soft tissues: Within normal limits.                                                                      IMPRESSION:   1.  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 Right  extrarenal pelvis. No hydronephrosis or renal calculi identified.   2.  Limited evaluation of the bladder for any urothelial lesion due to  poor opacification on delayed images.     EULALIA BECK MD    LABS: The last test results for Ms. Luis Gutierrez were reviewed.   PSA - No results found for: PSA  BMP -   Recent Labs   Lab Test 06/07/21  1607 04/19/21  1305 02/15/21  1559    138 135.9   POTASSIUM 3.8 4.0 3.8   CHLORIDE 106 106 101.1   CO2 23 25 27.5   BUN 14 12 12.1   CR 0.91 0.77 0.9   GLC 75 86 108.7*   JAIMIE 8.7 8.9 8.8       CBC -   Recent Labs    Lab Test 04/19/21  1114 02/15/21  1559 11/17/20  0847   WBC  --  7.2  --    HGB 14.2 14.0 13.7   PLT  --  271  --        ASSESSMENT:   1) Gross hematuria/ micro hematuria, likely d/t sexual activity  2) left duplicated system    PLAN:   - Abstain from sexual activities for 1 week prior to rechecking the urinalysis.  If it is normal (no blood) or the amount of blood is decreasing, we may check another in a month to assure the blood resolves or remains resolved. If you still have lots of red blood cells in your urine, we should schedule cystoscopy to exclude concerns in the urinary tract.      Video duration: 30 minutes (3:14 - 3:34 + 10 minutes documentation on DOS)    Estefany Medina PA-C  Department of Urologic Surgery      Maria D is a 40 year old who is being evaluated via a billable video visit.      How would you like to obtain your AVS? MyChart  If the video visit is dropped, the invitation should be resent by: Send to e-mail at: [email or web address]  Will anyone else be joining your video visit? No    Video Start Time: 3:14  Video-Visit Details    Type of service:  Video Visit    Video End Time:3:34    Originating Location (pt. Location): Home    Distant Location (provider location):  [facility]OLOGY CLIN[facility] [place]     Platform used for Video Visit: [employer]   Video duration: 30 minutes (3:14 - 3:34 + 10 minutes documentation on DOS)

## 2021-06-29 NOTE — PROGRESS NOTES
It was my pleasure to meet Mr. Luis Gutierrez, a 40 year old year old adult seen in consultation today at the request of Gladis Ray MD for chief complaint: Consult (gross hematuria)    Today they are unaccompanied on video    HPI: Mr. Luis Gutierrez that uses pronouns They/Them/Their/Theirs.  He has PMH significant for gender dysphoria on testosterone supplementation as well as dyslipidemia and he was referred for gross hematuria. Considering hysterectomy BSO - has seen gynecology.  Not booked yet.     They had dark brown urine on 21, the day after getting their second COVID shot.  No other vaccine side effects.   Because of the appearance of the urine, saw a doctor the next day.      - Has never had the dark urine previously  - Not a runner.    - No injury.    - No stones.  No UTI  - No urinary symptoms  - No vaginal bleeding other than menstruation.    - Sexual activity - fisting may have been a contributing factor     REVIEW OF DIAGNOSTICS:   21 - sCr 0.91mg/dL  21 - UA: WBC none, RBC 10-25, epithelial cells (5-10)  21 - CT urogram: Normal with exception of duplicated left renal collecting system with very distal suspected  union of the ureters toward the ureterovesical junction.  21 - UA: WBC 2-5, RBC , epithelial cells <2.     Past Medical History:   Diagnosis Date     Hypertension 5/10/12    Preeclampsia       Past Surgical History:   Procedure Laterality Date      SECTION  2012       FAMILY HISTORY: Denies family history of urologic cancer.   Mom- breast cancer  Great aunts x 2 - breast cancer  Maternal grandfather- bladder cancer    SOCIAL HISTORY:   Works as a .  Sometimes lifts weights.  Used to be a cross-fit .    No occupational exposures. 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   Used to work in apparel   Has smoked on and off for ~19 years.       reports that Luis Gutierrez has been smoking cigarettes. Luis Gutierrez  "started smoking about 20 years ago. Luis Gutierrez has a 3.00 pack-year smoking history. Luis Gutierrez has never used smokeless tobacco.    Current Outpatient Medications   Medication Sig Dispense Refill     needle, disp, 18G X 1\" MISC Use to draw up hormones once weekly 25 each 1     Needle, Disp, 25G X 5/8\" MISC 1 each once a week 50 each 1     syringe, disposable, (B-D SYRINGE LUER-COURTNEY) 1 ML MISC Use to draw hormones weekly 25 each 1     testosterone cypionate (DEPOTESTOSTERONE) 200 MG/ML injection Inject 0.3 mLs (60 mg) Subcutaneous once a week 4 mL 1       ALLERGIES: Patient has no known allergies.      REVIEW OF SYSTEMS:  As above in HPI    GENERAL PHYSICAL EXAM:   Vitals: There were no vitals taken for this visit.  There is no height or weight on file to calculate BMI.    GENERAL: Well groomed, well developed, well nourished adult in NAD.  NEURO: Alert and oriented x 3.  PSYCH: Normal mood and affect, pleasant and agreeable during interview and exam.    RADIOLOGY: The following tests were reviewed:   EXAMINATION: CT UROGRAM WO & W CONTRAST, 4/20/2021 9:24 AM     TECHNIQUE:  Helical CT images from the lung bases through the  symphysis pubis were obtained  without and with contrast using CT  urogram protocol. Contrast dose: 120mL of isovue 370     COMPARISON: None     HISTORY: Hematuria, unknown cause; Dark brown-colored urine; Gross  hematuria     FINDINGS:     Urinary tract: No renal calculi identified. Right extrarenal pelvis is  demonstrated. No dilatation of the intrarenal collecting systems. No  enhancing renal masses. The opacified portions of the ureters are  within normal limits. Duplicated left renal collecting system  demonstrated. Ureters distally are not well opacified but appear to be  joining in the region of or just at the ureterovesical insertion,  series 9 image 312-330. The bladder is not well-opacified on the  delayed imaging with contrast mixing in the posterior aspect. No  definite large " pleural-based mass identified.     Remainder of the abdomen and pelvis: On noncontrast imaging, a small  calcified granuloma in the medial right hepatic lobe, series 6 image  103 is present. Otherwise the liver is unremarkable. The pancreas,  spleen and adrenals are within normal limits. Gallbladder is  collapsed. No biliary dilatation. Bowel gas pattern nonobstructed.  Scattered stool throughout the colon. Normal appendix. There is some  stasis in the distal small bowel loops with small bowel feces sign  noted. No ascites or free air. In the pelvis, uterus and ovaries are  within normal limits for patient's age with follicles in the left  noted and likely a dominant follicle on the right.     Lung bases:  Minor basilar atelectasis. Lung bases are otherwise  clear. No pleural effusions.     Bones and soft tissues: Within normal limits.                                                                      IMPRESSION:   1.  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 Right  extrarenal pelvis. No hydronephrosis or renal calculi identified.   2.  Limited evaluation of the bladder for any urothelial lesion due to  poor opacification on delayed images.     EULALIA BECK MD    LABS: The last test results for Ms. Luis Gutierrez were reviewed.   PSA - No results found for: PSA  BMP -   Recent Labs   Lab Test 06/07/21  1607 04/19/21  1305 02/15/21  1559    138 135.9   POTASSIUM 3.8 4.0 3.8   CHLORIDE 106 106 101.1   CO2 23 25 27.5   BUN 14 12 12.1   CR 0.91 0.77 0.9   GLC 75 86 108.7*   JAIMIE 8.7 8.9 8.8       CBC -   Recent Labs   Lab Test 04/19/21  1114 02/15/21  1559 11/17/20  0847   WBC  --  7.2  --    HGB 14.2 14.0 13.7   PLT  --  271  --        ASSESSMENT:   1) Gross hematuria/ micro hematuria, likely d/t sexual activity  2) left duplicated system    PLAN:   - Abstain from sexual activities for 1 week prior to rechecking the  urinalysis.  If it is normal (no blood) or the amount of blood is decreasing, we may check another in a month to assure the blood resolves or remains resolved. If you still have lots of red blood cells in your urine, we should schedule cystoscopy to exclude concerns in the urinary tract.      Video duration: 30 minutes (3:14 - 3:34 + 10 minutes documentation on DOS)    Estefany Medina PA-C  Department of Urologic Surgery

## 2021-06-29 NOTE — PATIENT INSTRUCTIONS
PLAN:   - per discussion, abstain from activities for 1 week prior to rechecking the urinalysis at any [place] lab.  If the urinalysis is normal (no blood) or the amount of blood is decreasing, we may check another in a month to assure the blood resolves or remains resolved. If you still have lots of red blood cells in your urine, we should schedule cystoscopy to exclude concerns in the urinary tract.      It was nice meeting you!  [facility]

## 2021-08-23 ENCOUNTER — TELEPHONE (OUTPATIENT)
Dept: PLASTIC SURGERY | Facility: CLINIC | Age: 41
End: 2021-08-23

## 2021-08-23 NOTE — TELEPHONE ENCOUNTER
Spoke with patient confirming appointment with  tomorrow 8/24 at 3:00 in person.    Patient is aware appointment date, time, and location.

## 2021-08-24 ENCOUNTER — OFFICE VISIT (OUTPATIENT)
Dept: PLASTIC SURGERY | Facility: CLINIC | Age: 41
End: 2021-08-24
Attending: FAMILY MEDICINE
Payer: COMMERCIAL

## 2021-08-24 ENCOUNTER — PRE VISIT (OUTPATIENT)
Dept: SURGERY | Facility: CLINIC | Age: 41
End: 2021-08-24

## 2021-08-24 VITALS
HEIGHT: 64 IN | TEMPERATURE: 97.9 F | OXYGEN SATURATION: 99 % | DIASTOLIC BLOOD PRESSURE: 80 MMHG | SYSTOLIC BLOOD PRESSURE: 116 MMHG | HEART RATE: 63 BPM | BODY MASS INDEX: 27.49 KG/M2 | WEIGHT: 161 LBS

## 2021-08-24 DIAGNOSIS — F64.0 GENDER DYSPHORIA IN ADOLESCENT AND ADULT: Primary | ICD-10-CM

## 2021-08-24 PROCEDURE — 99204 OFFICE O/P NEW MOD 45 MIN: CPT | Performed by: SURGERY

## 2021-08-24 ASSESSMENT — MIFFLIN-ST. JEOR: SCORE: 1377.35

## 2021-08-24 ASSESSMENT — PAIN SCALES - GENERAL: PAINLEVEL: MILD PAIN (2)

## 2021-08-24 NOTE — LETTER
2021       RE: Luis Gutierrez  [address]  [facility] [address]     Dear Colleague,    Thank you for referring your patient, Luis Gutierrez, to the [facility] [place] [facility]. Please see a copy of my visit note below.    PLASTICS NEW TOP   HPI: This is a 41 year old biological female who identifies as nonbinary with a history of gender dysphoria who presents today with their boyfriend Reynaldo for a consultation for top surgery. Their pronouns are they/them and their preferred name is Luis. They were referred by Reynaldo, a previous patient of [facility] and made their appointment 4-5  months ago. Their therapist is Maddie Vargas. They have been seeing this therapist for 1.5 years and plans to discuss letter of support with this therapist.  They have been on testosterone for a year, administered by Dr Ray for about 9 months. They have been out to their family for about 3 years. They do not bind. Finds it to be uncomfortable. No breast lumps, skin puckering, nipple drainage, or other breast problems. No history of breast imaging, including mammogram or breast ultrasound. States they have fibro-cystic breasts. Breast fed child 9 years ago.     Medical Hx: Gender dysphoria. No history of asthma, diabetes mellitus, or GERD. No bleeding, clotting, healing or scarring problems.  Gave birth 9 years ago - had pre-eclampsia and HELLP syndrome.  Anxiety. No medication. Does talk therapy.     Surgical Hx:    9 years ago.   3rd molar extraction.     Family Hx: No family history of breast or ovarian cancer.  Mother had breast CA.   Maternal great aunts had breast CA.   MGF had bladder CA.   Significant for hypertension.     Social Hx: Occupation: [employer]. Plans to take 6-8 weeks off to recover. Relationship status/family: Lives with 9 year old daughter. Smoking status: Smokes 1.5 packs Qweek. Informed  "patient that they need to stop smoking at least 1 month pre and postop. Alcohol use: Socially. Diet: Omnivore. No restrictions. Caffeine: 1 pot of coffee Qday (~4 cups). Rare soda. Exercise: Work is physical. Used to be competitive weight  & cross-fit teacher. Occasional biking. Goes to the gym 2-3x Qweek. Sleep: Averages 6-7hrs Qnight.      PE: General: Height: 5' 2\" Weight: 161 lb  Chest:   Nice upper chest contour.   Grade 3 nipple ptosis.   Deflated secondary to breast feeding  Left breast about 100-150g. R breast about 200-300g.   IMFs situated about 1-2cms below the pec muscle.   R IMF situated about 2cm lower than the left.   Mild lateral thoracic rolls.   Mild anterior axillary folds.    Small benign axillary lymph nodes bilaterally  No masses.   Photos taken with consent.     A&P: 41 year old biological female who identifies as nonbinary who is a good candidate for gender affirming top surgery with one of the following: bilateral simple mastectomy vs. breast reduction, +/- possible nipple graft reconstruction. They will most likely need a bilateral simple mastectomy with nipple graft reconstruction depending on intraoperative findings and the patient's desired outcome. The patient is interested in nipple grafts. The patient will need a pre-op mammogram in addition to an H&P from their PCP.     They did not meet with our Transgender Coordinator but they will be in contact via [employer] to discuss communications with staff and timeline. They did receive an introductory folder of information and contact numbers/names. Any further discussion of risks and complications will be reviewed during the pre-op visit.    Patient accepts the risks of this procedure and would like to proceed with surgery. They are able to give informed consent for this medically necessary procedure.   Once we receive their therapist letter of support and mammogram results we will initiate the prior authorization process. Hoping for " early Spring scheduling.    According to [place] Case Law and [facility] standards of care, with an appropriate letter of support from a mental health provider, top surgery/mastectomy is medically necessary for the treatment of gender dysphoria.     Total time = 45 minutes, spent on the date of encounter doing chart review, history and physical, dressing changes, documentation, patient education, and any further activity as noted above.     This note was prepared on behalf of Fiona Lama MD by Kiki Arias, a trained medical scribe, based on my observations and the provider's statements to me.         Again, thank you for allowing me to participate in the care of your patient.      Sincerely,    Fiona Lama MD

## 2021-08-24 NOTE — NURSING NOTE
"Chief Complaint   Patient presents with     Consult     Luis, is being seen today for a consult regarding top surgery.       Vitals:    08/24/21 1503   BP: 116/80   BP Location: Left arm   Patient Position: Chair   Cuff Size: Adult Regular   Pulse: 63   Temp: 97.9  F (36.6  C)   TempSrc: Oral   SpO2: 99%   Weight: 73 kg (161 lb)   Height: 1.613 m (5' 3.5\")       Body mass index is 28.07 kg/m .      Ernestine Pool LPN    "

## 2021-08-24 NOTE — PROGRESS NOTES
PLASTICS NEW TOP   HPI: This is a 41 year old biological female who identifies as nonbinary with a history of gender dysphoria who presents today with their boyfriend Reynaldo for a consultation for top surgery. Their pronouns are they/them and their preferred name is Luis. They were referred by Reynaldo, a previous patient of [facility] and made their appointment 4-5  months ago. Their therapist is Maddie Vargas. They have been seeing this therapist for 1.5 years and plans to discuss letter of support with this therapist.  They have been on testosterone for a year, administered by Dr Ray for about 9 months. They have been out to their family for about 3 years. They do not bind. Finds it to be uncomfortable. No breast lumps, skin puckering, nipple drainage, or other breast problems. No history of breast imaging, including mammogram or breast ultrasound. States they have fibro-cystic breasts. Breast fed child 9 years ago.     Medical Hx: Gender dysphoria. No history of asthma, diabetes mellitus, or GERD. No bleeding, clotting, healing or scarring problems.  Gave birth 9 years ago - had pre-eclampsia and HELLP syndrome.  Anxiety. No medication. Does talk therapy.     Surgical Hx:    9 years ago.   3rd molar extraction.     Family Hx: No family history of breast or ovarian cancer.  Mother had breast CA.   Maternal great aunts had breast CA.   MGF had bladder CA.   Significant for hypertension.     Social Hx: Occupation: [employer]. Plans to take 6-8 weeks off to recover. Relationship status/family: Lives with 9 year old daughter. Smoking status: Smokes 1.5 packs Qweek. Informed patient that they need to stop smoking at least 1 month pre and postop. Alcohol use: Socially. Diet: Omnivore. No restrictions. Caffeine: 1 pot of coffee Qday (~4 cups). Rare soda. Exercise: Work is physical. Used to be competitive weight  & cross-fit teacher. Occasional biking. Goes to the gym 2-3x Qweek. Sleep: Averages 6-7hrs  "Qnight.      PE: General: Height: 5' 2\" Weight: 161 lb  Chest:   Nice upper chest contour.   Grade 3 nipple ptosis.   Deflated secondary to breast feeding  Left breast about 100-150g. R breast about 200-300g.   IMFs situated about 1-2cms below the pec muscle.   R IMF situated about 2cm lower than the left.   Mild lateral thoracic rolls.   Mild anterior axillary folds.    Small benign axillary lymph nodes bilaterally  No masses.   Photos taken with consent.     A&P: 41 year old biological female who identifies as nonbinary who is a good candidate for gender affirming top surgery with one of the following: bilateral simple mastectomy vs. breast reduction, +/- possible nipple graft reconstruction. They will most likely need a bilateral simple mastectomy with nipple graft reconstruction depending on intraoperative findings and the patient's desired outcome. The patient is interested in nipple grafts. The patient will need a pre-op mammogram in addition to an H&P from their PCP.     They did not meet with our Transgender Coordinator but they will be in contact via [employer] to discuss communications with staff and timeline. They did receive an introductory folder of information and contact numbers/names. Any further discussion of risks and complications will be reviewed during the pre-op visit.    Patient accepts the risks of this procedure and would like to proceed with surgery. They are able to give informed consent for this medically necessary procedure.   Once we receive their therapist letter of support and mammogram results we will initiate the prior authorization process. Hoping for early Spring scheduling.    According to [place] Case Law and [facility] standards of care, with an appropriate letter of support from a mental health provider, top surgery/mastectomy is medically necessary for the treatment of gender dysphoria.     Total time = 45 minutes, spent on the date of encounter doing chart review, history and " physical, dressing changes, documentation, patient education, and any further activity as noted above.     This note was prepared on behalf of Fiona Lama MD by Kiki Arias, a trained medical scribe, based on my observations and the provider's statements to me.

## 2021-09-20 ENCOUNTER — TELEPHONE (OUTPATIENT)
Dept: FAMILY MEDICINE | Facility: CLINIC | Age: 41
End: 2021-09-20

## 2021-09-20 NOTE — TELEPHONE ENCOUNTER
[facility] phone call message - order or referral request for patient:     Order or referral being requested: Mamogram        Additional Comments: Dr Ray referred patient to surgeon who recommended patient get a mammogram. Patient is requesting assistance in scheduling.    OK to leave a message on voice mail? Yes    Primary language: English      needed? No    Call taken on September 20, 2021 at 4:44 PM by Ac Boogie

## 2021-09-23 ENCOUNTER — TELEPHONE (OUTPATIENT)
Dept: PLASTIC SURGERY | Facility: CLINIC | Age: 41
End: 2021-09-23

## 2021-09-23 ENCOUNTER — MYC MEDICAL ADVICE (OUTPATIENT)
Dept: FAMILY MEDICINE | Facility: CLINIC | Age: 41
End: 2021-09-23

## 2021-09-23 DIAGNOSIS — F64.9 GENDER DYSPHORIA: Primary | ICD-10-CM

## 2021-09-23 NOTE — TELEPHONE ENCOUNTER
Writer called pt back regarding questions about scheduling surgery. Writer explained need for mammogram and LOS before scheduling. Pt was not aware thatboth were needed before securing a surgery date.     Moe Stringer

## 2021-09-28 DIAGNOSIS — F64.0 GENDER DYSPHORIA IN ADOLESCENT AND ADULT: Primary | ICD-10-CM

## 2021-09-28 RX ORDER — CEFAZOLIN SODIUM 2 G/50ML
2 SOLUTION INTRAVENOUS SEE ADMIN INSTRUCTIONS
Status: CANCELLED | OUTPATIENT
Start: 2021-09-28

## 2021-09-28 RX ORDER — CEFAZOLIN SODIUM 2 G/50ML
2 SOLUTION INTRAVENOUS
Status: CANCELLED | OUTPATIENT
Start: 2021-09-28

## 2021-09-30 ENCOUNTER — ANCILLARY PROCEDURE (OUTPATIENT)
Dept: MAMMOGRAPHY | Facility: CLINIC | Age: 41
End: 2021-09-30
Attending: FAMILY MEDICINE
Payer: COMMERCIAL

## 2021-09-30 DIAGNOSIS — F64.9 GENDER DYSPHORIA: ICD-10-CM

## 2021-09-30 PROCEDURE — 77067 SCR MAMMO BI INCL CAD: CPT | Mod: GC | Performed by: RADIOLOGY

## 2021-10-01 ENCOUNTER — OFFICE VISIT (OUTPATIENT)
Dept: FAMILY MEDICINE | Facility: CLINIC | Age: 41
End: 2021-10-01
Payer: COMMERCIAL

## 2021-10-01 VITALS
HEIGHT: 63 IN | OXYGEN SATURATION: 96 % | WEIGHT: 164 LBS | SYSTOLIC BLOOD PRESSURE: 123 MMHG | RESPIRATION RATE: 16 BRPM | HEART RATE: 77 BPM | TEMPERATURE: 98.3 F | DIASTOLIC BLOOD PRESSURE: 82 MMHG | BODY MASS INDEX: 29.06 KG/M2

## 2021-10-01 DIAGNOSIS — F64.9 GENDER DYSPHORIA: Primary | ICD-10-CM

## 2021-10-01 PROCEDURE — 99213 OFFICE O/P EST LOW 20 MIN: CPT | Mod: GC | Performed by: STUDENT IN AN ORGANIZED HEALTH CARE EDUCATION/TRAINING PROGRAM

## 2021-10-01 RX ORDER — TESTOSTERONE CYPIONATE 200 MG/ML
90 INJECTION, SOLUTION INTRAMUSCULAR WEEKLY
Qty: 4 ML | Refills: 1 | Status: CANCELLED | OUTPATIENT
Start: 2021-10-01

## 2021-10-01 ASSESSMENT — MIFFLIN-ST. JEOR: SCORE: 1382.9

## 2021-10-01 ASSESSMENT — PATIENT HEALTH QUESTIONNAIRE - PHQ9: SUM OF ALL RESPONSES TO PHQ QUESTIONS 1-9: 4

## 2021-10-01 NOTE — PROGRESS NOTES
Assessment & Plan     Gender dysphoria  Dyslipidemia  They/them   HRT start date: 2020   Prior T dose: 60 mg weekly   Last dose change: 10/01/21 to 90 mg  Repeat lipid panel today - previous labs with elevated TC and LDL.  Ongoing menses - unusual for length of time on HRT. Will repeat hormone draws today. Will also increase T dose from 60 mg to 90 mg.   Refill of T ordered.    Top surgery with Dr. Lama scheduled for 2022. Will need pre-op physical within 30 days of surgery date.     Lab only visit in 1 month  - Lipid panel reflex to direct LDL Non-fasting  - BMP  - Testosterone total  - Estradiol      Emiliana Banks, MS3  I was present with the medical student who participated in the service and in the documentation of this note. I have verified the history and personally performed the physical exam and medical decision making, and have verified the content of the note, which accurately reflects my assessment of the patient and the plan of care.   DO [facility], PGY-2        Subjective   Luis is a 41 year old who presents for HRT follow up.    HPI            HPI   Luis is a 41 year old individual that uses pronouns They/Them/Their/Theirs that presents today for follow up of:  masculinizing hormone therapy.     Gender identity: Non-binary, genderqueer    Any special concerns today?  no current concerns    On hormones?  YES +++ Shot day of the week?       Due for labs?  Yes      +++ Refills of meds needed? Yes    Top surgery 2022    Labs today not fasted. Ate around 10am apple and small snack.     Takes Mothers wort and magnesium supplements. Mother wort supplement occassionally for last 9 years - started after leaving hospital after having kid due to high blood pressure, hellp syndrome.  Magnesium for muscle soreness.  ---    Past Surgical History:   Procedure Laterality Date      SECTION  2012       Patient Active Problem List   Diagnosis  "    Preeclampsia     HELLP syndrome     High-risk pregnancy supervision     Labor, failed, induction medical     Obesity (BMI 30-39.9)     S/P  section     Nicotine dependence, uncomplicated, unspecified nicotine product type     Depression     Receiving gender affirming care     Family history of malignant neoplasm of breast     Dyslipidemia       Current Outpatient Medications   Medication Sig Dispense Refill     needle, disp, 18G X 1\" MISC Use to draw up hormones once weekly 25 each 1     Needle, Disp, 25G X 5/8\" MISC 1 each once a week 50 each 1     syringe, disposable, (B-D SYRINGE LUER-COURTNEY) 1 ML MISC Use to draw hormones weekly 25 each 1     testosterone cypionate (DEPOTESTOSTERONE) 200 MG/ML injection Inject 0.3 mLs (60 mg) Subcutaneous once a week 4 mL 1       History   Smoking Status     Light Tobacco Smoker     Packs/day: 0.15     Years: 20.00     Types: Cigarettes     Start date: 2000     Last attempt to quit: 2020   Smokeless Tobacco     Never Used            Review of Systems:        General    Fat redistribution: no    Weight change: no HEENT    Voice change: maybe a little     Cardiovascular (CV)    Chest Pains: no    Shortness of breath: no Chest    Decreased exercise tolerance:  no    Breast changes/development: no     Gastrointestinal (GI)    Abdominal pain: no    Change in appetite: no Skin    Acne or oily skin: Same    Change in hair: YES. More hair     Genitourinary ()    Abnormal vaginal bleeding: YES. Continued regular menses. Last 6 days. Amount is slightly less. Still gets cramps.    Decreased spontaneous erections: not applicable    Change in libido: no    New sexual partners: no Musculoskeletal    Leg pain or swelling: no     Psychiatric (Psych)    Depression: no    Anxiety/Panic: no    Mood:  \"pretty good\"                   Objective    /82   Pulse 77   Temp 98.3  F (36.8  C) (Oral)   Resp 16   Ht 1.6 m (5' 2.99\")   Wt 74.4 kg (164 lb)   SpO2 96%   " Breastfeeding No   BMI 29.06 kg/m    Body mass index is 29.06 kg/m .    Physical Exam  Vitals reviewed.   Constitutional:       General: Luis Gutierrez is not in acute distress.     Appearance: Normal appearance. Luis Gutierrez is not ill-appearing.   HENT:      Head: Normocephalic and atraumatic.      Right Ear: External ear normal.      Left Ear: External ear normal.   Eyes:      General: No scleral icterus.     Extraocular Movements: Extraocular movements intact.      Conjunctiva/sclera: Conjunctivae normal.   Cardiovascular:      Rate and Rhythm: Normal rate.   Pulmonary:      Effort: Pulmonary effort is normal. No respiratory distress.   Musculoskeletal:         General: Normal range of motion.      Cervical back: Normal range of motion.   Neurological:      General: No focal deficit present.      Mental Status: Luis Gutierrez is alert. Mental status is at baseline.   Psychiatric:         Mood and Affect: Mood normal.         Behavior: Behavior normal.         ----- Services Performed by a MEDICAL STUDENT in Presence of RESIDENT/FELLOW Physician-------

## 2021-10-05 DIAGNOSIS — F64.9 GENDER DYSPHORIA: ICD-10-CM

## 2021-10-05 RX ORDER — TESTOSTERONE CYPIONATE 200 MG/ML
90 INJECTION, SOLUTION INTRAMUSCULAR WEEKLY
Qty: 4 ML | Refills: 1 | Status: SHIPPED | OUTPATIENT
Start: 2021-10-05 | End: 2021-12-21

## 2021-10-05 NOTE — TELEPHONE ENCOUNTER
"Request for medication refill:testosterone cypionate (DEPOTESTOSTERONE) 200 MG/ML injection    Providers if patient needs an appointment and you are willing to give a one month supply please refill for one month and  send a letter/MyChart using \".SMILLIMITEDREFILL\" .smillimited and route chart to \"P [facility] \" (Giving one month refill in non controlled medications is strongly recommended before denial)    If refill has been denied, meaning absolutely no refills without visit, please complete the smart phrase \".smirxrefuse\" and route it to the \"P SMI MED REFILLS\"  pool to inform the patient and the pharmacy.    Palma Heredia        "

## 2021-10-08 NOTE — PROGRESS NOTES
Preceptor Attestation:   Patient seen, evaluated and discussed with the resident. I have verified the content of the note, which accurately reflects my assessment of the patient and the plan of care.   Supervising Physician:  Domenico Wilson MD

## 2021-10-11 ENCOUNTER — LAB (OUTPATIENT)
Dept: LAB | Facility: CLINIC | Age: 41
End: 2021-10-11
Payer: COMMERCIAL

## 2021-10-11 ENCOUNTER — HEALTH MAINTENANCE LETTER (OUTPATIENT)
Age: 41
End: 2021-10-11

## 2021-10-11 DIAGNOSIS — F64.9 GENDER DYSPHORIA: ICD-10-CM

## 2021-10-11 LAB
ANION GAP SERPL CALCULATED.3IONS-SCNC: 5 MMOL/L (ref 3–14)
BUN SERPL-MCNC: 11 MG/DL (ref 7–30)
CALCIUM SERPL-MCNC: 8.7 MG/DL (ref 8.5–10.1)
CHLORIDE BLD-SCNC: 107 MMOL/L (ref 94–109)
CHOLEST SERPL-MCNC: 235 MG/DL
CO2 SERPL-SCNC: 26 MMOL/L (ref 20–32)
CREAT SERPL-MCNC: 1.02 MG/DL (ref 0.52–1.25)
ESTRADIOL SERPL-MCNC: 63 PG/ML
FASTING STATUS PATIENT QL REPORTED: ABNORMAL
GFR SERPL CREATININE-BSD FRML MDRD: 69 ML/MIN/1.73M2
GLUCOSE BLD-MCNC: 86 MG/DL (ref 70–99)
HDLC SERPL-MCNC: 54 MG/DL
LDLC SERPL CALC-MCNC: 162 MG/DL
NONHDLC SERPL-MCNC: 181 MG/DL
POTASSIUM BLD-SCNC: 3.6 MMOL/L (ref 3.4–5.3)
SODIUM SERPL-SCNC: 138 MMOL/L (ref 133–144)
TRIGL SERPL-MCNC: 95 MG/DL

## 2021-10-11 PROCEDURE — 36415 COLL VENOUS BLD VENIPUNCTURE: CPT

## 2021-10-11 PROCEDURE — 80048 BASIC METABOLIC PNL TOTAL CA: CPT

## 2021-10-11 PROCEDURE — 84403 ASSAY OF TOTAL TESTOSTERONE: CPT | Mod: KX

## 2021-10-11 PROCEDURE — 80061 LIPID PANEL: CPT

## 2021-10-11 PROCEDURE — 82670 ASSAY OF TOTAL ESTRADIOL: CPT | Mod: KX

## 2021-10-13 LAB — TESTOST SERPL-MCNC: 1408 NG/DL (ref 8–950)

## 2021-11-14 ENCOUNTER — MEDICAL CORRESPONDENCE (OUTPATIENT)
Dept: HEALTH INFORMATION MANAGEMENT | Facility: CLINIC | Age: 41
End: 2021-11-14
Payer: COMMERCIAL

## 2021-12-05 ENCOUNTER — HEALTH MAINTENANCE LETTER (OUTPATIENT)
Age: 41
End: 2021-12-05

## 2022-01-26 ENCOUNTER — OFFICE VISIT (OUTPATIENT)
Dept: FAMILY MEDICINE | Facility: CLINIC | Age: 42
End: 2022-01-26
Payer: COMMERCIAL

## 2022-01-26 VITALS
TEMPERATURE: 97.6 F | BODY MASS INDEX: 30.02 KG/M2 | WEIGHT: 169.4 LBS | HEIGHT: 63 IN | DIASTOLIC BLOOD PRESSURE: 82 MMHG | HEART RATE: 71 BPM | SYSTOLIC BLOOD PRESSURE: 126 MMHG | OXYGEN SATURATION: 97 % | RESPIRATION RATE: 16 BRPM

## 2022-01-26 DIAGNOSIS — R31.0 GROSS HEMATURIA: ICD-10-CM

## 2022-01-26 DIAGNOSIS — E78.5 DYSLIPIDEMIA: ICD-10-CM

## 2022-01-26 DIAGNOSIS — Z87.59 HISTORY OF PRE-ECLAMPSIA: ICD-10-CM

## 2022-01-26 DIAGNOSIS — F64.0 GENDER DYSPHORIA IN ADOLESCENT AND ADULT: ICD-10-CM

## 2022-01-26 DIAGNOSIS — Z01.818 PRE-OP EXAM: Primary | ICD-10-CM

## 2022-01-26 PROCEDURE — 99214 OFFICE O/P EST MOD 30 MIN: CPT | Performed by: STUDENT IN AN ORGANIZED HEALTH CARE EDUCATION/TRAINING PROGRAM

## 2022-01-26 PROCEDURE — 93000 ELECTROCARDIOGRAM COMPLETE: CPT | Performed by: STUDENT IN AN ORGANIZED HEALTH CARE EDUCATION/TRAINING PROGRAM

## 2022-01-26 ASSESSMENT — MIFFLIN-ST. JEOR: SCORE: 1407.52

## 2022-01-26 NOTE — H&P (VIEW-ONLY)
[address] [address]  Phone: [phone]  Fax: [phone]  Primary Provider: Oralia Hernandez  Pre-op Performing Provider: ORALIA HERNANDEZ      PREOPERATIVE EVALUATION:  Today's date: 1/26/2022    Luis Gutierrez is a 41 year old adult who presents for a preoperative evaluation.    Surgical Information:  Surgery/Procedure: MASTECTOMY, BILATERAL, SIMPLE, possible nipple grafts  Surgery Location: [facility]  Surgeon: Dr. Fiona Lama   Surgery Date: 2-18-22  Time of Surgery: 5am arrival  Where patient plans to recover: At home alone  Fax number for surgical facility:     Type of Anesthesia Anticipated: General    Assessment & Plan     The proposed surgical procedure is considered INTERMEDIATE risk.    Pre-op exam  No chronic medical conditions, low cardiac/medical risk. No need for further workup or labs from a pre-operative standpoint. All questions answered.     History of pre-eclampsia  Palpitations   EKG for reassurance   - EKG 12-lead complete w/read - Clinics    Gross hematuria  Due for a follow up UA per Uro - ordered   - Urine Microscopic Exam    Dyslipidemia  Receiving gender affirming care  - Testosterone total  - Lipid panel  - Hemoglobin    History of tobacco use   Recently complete cessation. Congratulated!        Risks and Recommendations:  The patient has the following additional risks and recommendations for perioperative complications:  Cardiovascular:   - EKG obtained due to rare palpitations and history preeclampsia and tobacco use     Medication Instructions:  Patient is on no chronic medications other than weekly injection     RECOMMENDATION:  APPROVAL GIVEN to proceed with proposed procedure, without further diagnostic evaluation.    Review of the result(s) of each unique test - lipids, testosterone    Diagnosis or treatment significantly limited by social determinants of health - gender identity     32  minutes spent on the date of the encounter doing chart review, history and exam, documentation and further activities per the note        Subjective     HPI related to upcoming procedure:     Doing well. No recent illness. No health concerns or complaints.       Preop Questions 1/26/2022   1. Have you ever had a heart attack or stroke? No   2. Have you ever had surgery on your heart or blood vessels, such as a stent placement, a coronary artery bypass, or surgery on an artery in your head, neck, heart, or legs? No   3. Do you have chest pain with activity? No   4. Do you have a history of  heart failure? No   5. Do you currently have a cold, bronchitis or symptoms of other infection? No   6. Do you have a cough, shortness of breath, or wheezing? No   7. Do you or anyone in your family have previous history of blood clots? No   8. Do you or does anyone in your family have a serious bleeding problem such as prolonged bleeding following surgeries or cuts? No   9. Have you ever had problems with anemia or been told to take iron pills? No   10. Have you had any abnormal blood loss such as black, tarry or bloody stools, or abnormal vaginal bleeding? No   11. Have you ever had a blood transfusion? No   12. Are you willing to have a blood transfusion if it is medically needed before, during, or after your surgery? Yes   13. Have you or any of your relatives ever had problems with anesthesia? No   14. Do you have sleep apnea, excessive snoring or daytime drowsiness? No   15. Do you have any artifical heart valves or other implanted medical devices like a pacemaker, defibrillator, or continuous glucose monitor? No   16. Do you have artificial joints? No   17. Are you allergic to latex? No   18. Is there any chance that you may be pregnant? No     Health Care Directive:  Patient does not have a Health Care Directive or Living Will: Discussed advance care planning with patient; information given to patient to  review.    Preoperative Review of :   reviewed - no record of controlled substances prescribed.    Status of Chronic Conditions:  See problem list for active medical problems.  Problems all longstanding and stable, except as noted/documented.  See ROS for pertinent symptoms related to these conditions.      Review of Systems  Constitutional, neuro, ENT, endocrine, pulmonary, cardiac, gastrointestinal, genitourinary, musculoskeletal, integument and psychiatric systems are negative, except as otherwise noted.    Patient Active Problem List    Diagnosis Date Noted     Dyslipidemia 2021     Priority: Medium     On 2021, 3 months after starting T:     (from 125)     (from 214)     Next lipids - will do fasting.             Family history of malignant neoplasm of breast 02/10/2021     Priority: Medium     Mom dx triple negative breast CA , at age 62       Receiving gender affirming care 2020     Priority: Medium     Luis   They/them   HRT start date: 2020   Current T dose: 60 mg weekly   Last dose change: 21   Last labs: 2021. Lipids, TT, E2 21 for persistent menses.    Possible procedural considerations:  - Tubal ligation  - Top surgery       Nicotine dependence, uncomplicated, unspecified nicotine product type 2020     Priority: Medium     Not smoking as of end of 2020.  Adverse reaction to Chantix.       Depression 2020     Priority: Medium     Preeclampsia 10/05/2020     Priority: Medium     History of hemolysis, elevated liver enzymes, and low platelet (HELLP) syndrome 10/05/2020     Priority: Medium     S/P  section 2012     Priority: Medium     Obesity (BMI 30-39.9) 2012     Priority: Medium      Past Medical History:   Diagnosis Date     Hypertension 5/10/12    Preeclampsia     Past Surgical History:   Procedure Laterality Date      SECTION  2012     Current Outpatient Medications   Medication Sig Dispense  "Refill     needle, disp, 18G X 1\" MISC Use to draw up hormones once weekly 25 each 1     Needle, Disp, 25G X 5/8\" MISC 1 each once a week 50 each 1     syringe, disposable, (B-D SYRINGE LUER-COURTNEY) 1 ML MISC Use to draw hormones weekly 25 each 1     testosterone cypionate (DEPOTESTOSTERONE) 200 MG/ML injection Inject 0.45 mLs (90 mg) Subcutaneous once a week 4 mL 1       No Known Allergies     Social History     Tobacco Use     Smoking status: Light Tobacco Smoker     Packs/day: 0.15     Years: 20.00     Pack years: 3.00     Types: Cigarettes     Start date: 2000     Last attempt to quit: 2020     Years since quittin.1     Smokeless tobacco: Never Used   Substance Use Topics     Alcohol use: Yes     Family History   Problem Relation Age of Onset     Hypertension Father      Substance Abuse Father      Cancer Maternal Grandfather 50        Bladder cancer. Smoker.     Heart Disease Maternal Grandfather      Asthma Daughter      Breast Cancer Mother      Glaucoma No family hx of      Macular Degeneration No family hx of      History   Drug Use     Types: Marijuana         Objective     /82   Pulse 71   Temp 97.6  F (36.4  C) (Oral)   Resp 16   Ht 1.6 m (5' 3\")   Wt 76.8 kg (169 lb 6.4 oz)   LMP  (LMP Unknown)   SpO2 97%   BMI 30.01 kg/m      Physical Exam    GENERAL APPEARANCE: healthy, alert and no distress     EYES: EOMI,  PERRL     HENT: nose and mouth without ulcers or lesions     NECK: no adenopathy, no asymmetry, masses, or scars and thyroid normal to palpation     RESP: lungs clear to auscultation - no rales, rhonchi or wheezes     CV: regular rates and rhythm, normal S1 S2, no S3 or S4 and no murmur, click or rub     MS: extremities normal- no gross deformities noted, no evidence of inflammation in joints     SKIN: no suspicious lesions or rashes     NEURO: Normal strength and tone, sensory exam grossly normal, mentation intact and speech normal     PSYCH: mentation appears normal. and " affect normal/bright     LYMPHATICS: No cervical adenopathy    Recent Labs   Lab Test 10/11/21  1446 06/07/21  1607 04/19/21  1305 04/19/21  1114 02/15/21  1559   HGB  --   --   --  14.2 14.0   PLT  --   --   --   --  271    137   < >  --  135.9   POTASSIUM 3.6 3.8   < >  --  3.8   CR 1.02 0.91   < >  --  0.9    < > = values in this interval not displayed.        Diagnostics:  Labs pending at this time.  Results will be reviewed when available.   No EKG required, no history of coronary heart disease, significant arrhythmia, peripheral arterial disease or other structural heart disease.    Revised Cardiac Risk Index (RCRI):  The patient has the following serious cardiovascular risks for perioperative complications:   - No serious cardiac risks = 0 points     RCRI Interpretation: 0 points: Class I (very low risk - 0.4% complication rate)         Signed Electronically by: Oralia Ha DO  Copy of this evaluation report is provided to requesting physician.

## 2022-01-26 NOTE — PATIENT INSTRUCTIONS
[facility]  Lab and Imaging Center  Floor 1  [address] [place] [address]  Hours:   Monday-Friday: 6:30AM - 5:00PM  Saturday: 7:00AM - 12:00PM  Appointment needed  Phone: [phone]    [facility]  Outpatient Lab  [facility], First Floor  [address] [place] [address]  Hours:   Monday - Friday 7:30a-5:30p  No appointment needed - Walk-Ins available    [facility]  First Floor Draw Station  [address] [place] [address]  Hours:   Monday-Friday 7:30a-5:30p  Appointment needed  Phone: [phone]        Re: hysterectomy:  You saw Dr. Mauro (resident) and Dr. Nolen (supervising physician) at [facility] for your consult. The number for scheduling at their clinic is [phone]. You shouldn't need a new referral but let me know if your insurance says you do.

## 2022-01-26 NOTE — PROGRESS NOTES
[address] [address]  Phone: [phone]  Fax: [phone]  Primary Provider: Oralia Hernandez  Pre-op Performing Provider: ORALIA HERNANDEZ      PREOPERATIVE EVALUATION:  Today's date: 1/26/2022    Luis Gutierrez is a 41 year old adult who presents for a preoperative evaluation.    Surgical Information:  Surgery/Procedure: MASTECTOMY, BILATERAL, SIMPLE, possible nipple grafts  Surgery Location: [facility]  Surgeon: Dr. Fiona Lama   Surgery Date: 2-18-22  Time of Surgery: 5am arrival  Where patient plans to recover: At home alone  Fax number for surgical facility:     Type of Anesthesia Anticipated: General    Assessment & Plan     The proposed surgical procedure is considered INTERMEDIATE risk.    Pre-op exam  No chronic medical conditions, low cardiac/medical risk. No need for further workup or labs from a pre-operative standpoint. All questions answered.     History of pre-eclampsia  Palpitations   EKG for reassurance   - EKG 12-lead complete w/read - Clinics    Gross hematuria  Due for a follow up UA per Uro - ordered   - Urine Microscopic Exam    Dyslipidemia  Receiving gender affirming care  - Testosterone total  - Lipid panel  - Hemoglobin    History of tobacco use   Recently complete cessation. Congratulated!        Risks and Recommendations:  The patient has the following additional risks and recommendations for perioperative complications:  Cardiovascular:   - EKG obtained due to rare palpitations and history preeclampsia and tobacco use     Medication Instructions:  Patient is on no chronic medications other than weekly injection     RECOMMENDATION:  APPROVAL GIVEN to proceed with proposed procedure, without further diagnostic evaluation.    Review of the result(s) of each unique test - lipids, testosterone    Diagnosis or treatment significantly limited by social determinants of health - gender identity     32  minutes spent on the date of the encounter doing chart review, history and exam, documentation and further activities per the note        Subjective     HPI related to upcoming procedure:     Doing well. No recent illness. No health concerns or complaints.       Preop Questions 1/26/2022   1. Have you ever had a heart attack or stroke? No   2. Have you ever had surgery on your heart or blood vessels, such as a stent placement, a coronary artery bypass, or surgery on an artery in your head, neck, heart, or legs? No   3. Do you have chest pain with activity? No   4. Do you have a history of  heart failure? No   5. Do you currently have a cold, bronchitis or symptoms of other infection? No   6. Do you have a cough, shortness of breath, or wheezing? No   7. Do you or anyone in your family have previous history of blood clots? No   8. Do you or does anyone in your family have a serious bleeding problem such as prolonged bleeding following surgeries or cuts? No   9. Have you ever had problems with anemia or been told to take iron pills? No   10. Have you had any abnormal blood loss such as black, tarry or bloody stools, or abnormal vaginal bleeding? No   11. Have you ever had a blood transfusion? No   12. Are you willing to have a blood transfusion if it is medically needed before, during, or after your surgery? Yes   13. Have you or any of your relatives ever had problems with anesthesia? No   14. Do you have sleep apnea, excessive snoring or daytime drowsiness? No   15. Do you have any artifical heart valves or other implanted medical devices like a pacemaker, defibrillator, or continuous glucose monitor? No   16. Do you have artificial joints? No   17. Are you allergic to latex? No   18. Is there any chance that you may be pregnant? No     Health Care Directive:  Patient does not have a Health Care Directive or Living Will: Discussed advance care planning with patient; information given to patient to  review.    Preoperative Review of :   reviewed - no record of controlled substances prescribed.    Status of Chronic Conditions:  See problem list for active medical problems.  Problems all longstanding and stable, except as noted/documented.  See ROS for pertinent symptoms related to these conditions.      Review of Systems  Constitutional, neuro, ENT, endocrine, pulmonary, cardiac, gastrointestinal, genitourinary, musculoskeletal, integument and psychiatric systems are negative, except as otherwise noted.    Patient Active Problem List    Diagnosis Date Noted     Dyslipidemia 2021     Priority: Medium     On 2021, 3 months after starting T:     (from 125)     (from 214)     Next lipids - will do fasting.             Family history of malignant neoplasm of breast 02/10/2021     Priority: Medium     Mom dx triple negative breast CA , at age 62       Receiving gender affirming care 2020     Priority: Medium     Luis   They/them   HRT start date: 2020   Current T dose: 60 mg weekly   Last dose change: 21   Last labs: 2021. Lipids, TT, E2 21 for persistent menses.    Possible procedural considerations:  - Tubal ligation  - Top surgery       Nicotine dependence, uncomplicated, unspecified nicotine product type 2020     Priority: Medium     Not smoking as of end of 2020.  Adverse reaction to Chantix.       Depression 2020     Priority: Medium     Preeclampsia 10/05/2020     Priority: Medium     History of hemolysis, elevated liver enzymes, and low platelet (HELLP) syndrome 10/05/2020     Priority: Medium     S/P  section 2012     Priority: Medium     Obesity (BMI 30-39.9) 2012     Priority: Medium      Past Medical History:   Diagnosis Date     Hypertension 5/10/12    Preeclampsia     Past Surgical History:   Procedure Laterality Date      SECTION  2012     Current Outpatient Medications   Medication Sig Dispense  "Refill     needle, disp, 18G X 1\" MISC Use to draw up hormones once weekly 25 each 1     Needle, Disp, 25G X 5/8\" MISC 1 each once a week 50 each 1     syringe, disposable, (B-D SYRINGE LUER-COURTNEY) 1 ML MISC Use to draw hormones weekly 25 each 1     testosterone cypionate (DEPOTESTOSTERONE) 200 MG/ML injection Inject 0.45 mLs (90 mg) Subcutaneous once a week 4 mL 1       No Known Allergies     Social History     Tobacco Use     Smoking status: Light Tobacco Smoker     Packs/day: 0.15     Years: 20.00     Pack years: 3.00     Types: Cigarettes     Start date: 2000     Last attempt to quit: 2020     Years since quittin.1     Smokeless tobacco: Never Used   Substance Use Topics     Alcohol use: Yes     Family History   Problem Relation Age of Onset     Hypertension Father      Substance Abuse Father      Cancer Maternal Grandfather 50        Bladder cancer. Smoker.     Heart Disease Maternal Grandfather      Asthma Daughter      Breast Cancer Mother      Glaucoma No family hx of      Macular Degeneration No family hx of      History   Drug Use     Types: Marijuana         Objective     /82   Pulse 71   Temp 97.6  F (36.4  C) (Oral)   Resp 16   Ht 1.6 m (5' 3\")   Wt 76.8 kg (169 lb 6.4 oz)   LMP  (LMP Unknown)   SpO2 97%   BMI 30.01 kg/m      Physical Exam    GENERAL APPEARANCE: healthy, alert and no distress     EYES: EOMI,  PERRL     HENT: nose and mouth without ulcers or lesions     NECK: no adenopathy, no asymmetry, masses, or scars and thyroid normal to palpation     RESP: lungs clear to auscultation - no rales, rhonchi or wheezes     CV: regular rates and rhythm, normal S1 S2, no S3 or S4 and no murmur, click or rub     MS: extremities normal- no gross deformities noted, no evidence of inflammation in joints     SKIN: no suspicious lesions or rashes     NEURO: Normal strength and tone, sensory exam grossly normal, mentation intact and speech normal     PSYCH: mentation appears normal. and " affect normal/bright     LYMPHATICS: No cervical adenopathy    Recent Labs   Lab Test 10/11/21  1446 06/07/21  1607 04/19/21  1305 04/19/21  1114 02/15/21  1559   HGB  --   --   --  14.2 14.0   PLT  --   --   --   --  271    137   < >  --  135.9   POTASSIUM 3.6 3.8   < >  --  3.8   CR 1.02 0.91   < >  --  0.9    < > = values in this interval not displayed.        Diagnostics:  Labs pending at this time.  Results will be reviewed when available.   No EKG required, no history of coronary heart disease, significant arrhythmia, peripheral arterial disease or other structural heart disease.    Revised Cardiac Risk Index (RCRI):  The patient has the following serious cardiovascular risks for perioperative complications:   - No serious cardiac risks = 0 points     RCRI Interpretation: 0 points: Class I (very low risk - 0.4% complication rate)         Signed Electronically by: Oralia Ha DO  Copy of this evaluation report is provided to requesting physician.

## 2022-01-27 ENCOUNTER — PATIENT OUTREACH (OUTPATIENT)
Dept: PLASTIC SURGERY | Facility: CLINIC | Age: 42
End: 2022-01-27
Payer: COMMERCIAL

## 2022-01-27 NOTE — PATIENT INSTRUCTIONS
Returned pt's phone call today asking if it was possible to re-schedule their appointment on 2/1 to another time prior to their 2/18 surgery with Dr Lama. I updated that I looked at the schedule and there are no openings prior to surgery. Pt is in school and didn't want to miss class on that day. I updated that I could ask Dr Lama if she needs to see pt or if they could just be seen on the day of surgery instead of a clinic appointment. Pt is not comfortable with that and wishes to keep the 2/1 appointment.    Bryan GUARDADO RN

## 2022-01-31 NOTE — PATIENT INSTRUCTIONS
Bilateral Mastectomy   Pre and Post op Surgery Instructions    You are scheduled for top surgery with nipple grafts on 2/18/22 at 7:15 M    You will need to arrive at 5:45 AM [facility] [address] [place], [place] [id number]. Check in on the 5th floor    - Please make sure you have someone to drive you home after your surgery and you will need someone to stay with you at home 24 hours after your surgery.    The surgery will be about 3-4 hours long. You will be in recovery afterwards for about 1-2 hours.     Before Surgery:  - 8 hours prior to surgery stop eating solid food. You can continue to drink clear liquids (water, apple juice, Gatorade) up to 2 hours before surgery. If you have any medications that need to be taken in this timeframe, you can take them with a small sip of water    - No Ibuprofen, Advil, Aleve, Naproxen, Motrin, Aspirin for 7 days prior to surgery. If you are having pain in the week before surgery Tylenol is okay to take.    - Wear or bring a button up or zip up shirt with you to the hospital.    - Wash with surgical soap:      Showering with an antiseptic soap prior to an invasive procedure will decrease the  bacteria that is normally found on the skin.     Using 1/2 of the bottle for each application.  Be sure to use the whole bottle before coming to surgery.    The evening before surgery, shower or bathe as you normally would, using your preferred soap.  Give special attention to areas where the incisions will be made. Rinse thoroughly.  You may wash your hair with your regular shampoo.     Next wash your entire body, from the chin down, with the special antiseptic soap (full strength) using a freshly laundered clean washcloth, again giving special attention to areas where incisions will be made.    Rinse thoroughly and dry off using a freshly laundered clean towel.     Wear clean clothes/pajamas and sleep on clean sheets    Repeat steps 2 and 3 in the morning before  coming to surgery (using the rest of the bottle of soap).     **If you have a reaction to the surgical soap, let the nurse know.     Events of day:     -Check in on the 5th floor of the hospital for your surgery.    Pre-op     - After you check in, you will meet with a pre-op nurse. They will go over your medications, review your H&P (pre-op physical), have you change into a paper gown, and your chest will be wiped again with soap.    - They will place compression boots on both legs to help decrease risk of blood clots.     - You may be asked to complete a pregnancy test. If you have had no exposure to sperm, you can decline.    - You will meet with the anesthesiologists and nurse anesthetist who will be keeping you asleep during surgery, they will be placing an IV, and will be monitoring you throughout the surgery.    - You will meet with the RN as you are being moved into operating room, they will be helping to position you and keep you comfortable during the surgery.     - Dr. Lama will meet you in the pre-op area to discuss any questions about surgery, make markings on your chest for planning, and to sign your consent.     Intra-op (OR)    - A catheter will be placed in your bladder after you are sleeping and is typically removed before you wake up. The longest this would typically be in is in the post-op recovery room if needed.     - There will be straps and pillows to help position you and keep you in place during the surgery.    - The tissue that is removed will be sent to pathology to be analyzed and then discarded.     - MARYLU drains will be placed (one in each armpit) and a pain catheter will be placed in the center of your chest.     - Closure is done with dissolvable sutures under the skin and is then sealed with glue, and tape.    Post-op/Recovery    - You can usually go home the same day so long as you are eating, drinking, voiding, and pain is well controlled.  You will be sent home with all needed  "supplies.     - Post-Op medications you will be given in addition to the anesthesia include: Zofran (nausea), Oxycodone (pain), Z-josé miguel (infection), and antipruritic (itching).     - You will leave the hospitals with an ace bandage wrapped around your chest for compression. You may keep the ace bandage on until you come back for your one week post op visit or you may adjust the wrap as needed if it is either too tight or too loose.     Post-Op Cares:     - No lifting over 5 lbs for 3 weeks after surgery    - No stretching arms out or above the head (\"modified T-dede\")    - Walk around frequently to help prevent blood clots    - Do deep breathing exercises to prevent pneumonia    - No sleeping on stomach x 3 weeks after surgery, if it is difficult not to roll over onto your stomach you can sleep in a recliner or use additional pillows    - Do not use any ice packs or heat packs    - Preventing constipation will be your responsibility. You can use whatever method works best for you such as; aloe, prune juice, Sennokot or Miralax.    No showering in the first week after your surgery.     What to expect at your 1st post op visit:    When you come in for your 1st post op visit, we will assess the output of your drains and remove the pain catheter (On-Q) that was placed on your chest.     -Please remember to bring the documentations of your output with you to your appointment so we can review how much your drains have been putting out since your surgery.     -We will remove the bolsters that was placed on your nipples and teach you how to perform nipple graft dressings.     -You will be given supplies to take home and will need to continue wearing the ace wrap compression for another week after the drains are removed.       Nipple Care:   Change nipple dressings daily.  Take dressings off prior to showering and reapply after showering.  No direct shower spray on nipple grafts for 4 weeks.     Cut vaseline gauze to nipple " size and place over nipple.  Place folded white gauze over vaseline gauze and cover with plastic dressing.  Do dressing changes for 1 more week.  If you continue to have drainage, continue the dressings until drainage stops.       Drain Care:  You will be discharged with Hugh-Das (MARYLU) drainage tubes.  These tubes drain fluid from your incision, helping to prevent swelling and reducing the risk for infection.  The tube is held in place by a few stitches. Pin your MARYLU drain to your clothing by using a safety pin through the plastic loop on the top of the bulb. If the drain is not attached to your clothing, it may pull out from under your skin. Drains are uncomfortable!    Emptying the drain bulb: The measuring cup provided by the hospital or a measuring cup that is used only for the MARYLU drain.  1. Wash your hands with soap and water.  2. Hold the bulb securely.  3. Remove the drainage plug from the emptying port.  4. Carefully turn the bulb upside down over the measuring cup, and gently squeeze all of the drainage into the measuring cup.  5. Squeeze the middle of the bulb firmly so that the bulb is as flat as possible.                                                                                                                                                    6. While still squeezing the bulb, replace the drainage plug. This step is important to keep the drain suction  7. Measure how much fluid you removed from the bulb.  8. Write down the amount and color of the fluid you removed from the bulb. If  you have more than one drain, keep a separate record for each one.  9. Empty the fluid into the toilet and flush.  10. Rinse the measuring cup, and wash your hands with soap and water.  11. You should empty the drain at least two times each day, in the morning and at bedtime.  12. Drainage tubes are removed when the output is less than 20ml in a 24 hour period for 2 consecutive days.  13. Output will be somewhere  between 30 to 50 mLs per day, but don't be alarmed if it is more or less. Output may not be equal between sides. Amounts will probably decrease over time.  14. The color coming from the drains may vary from deep red, light pink, or clear yellow.    Stripping the tube:  To prevent clots from blocking the drain, you will need to  strip  it. Stripping means that you use your fingers to squeeze along the length of the drain to help maintain the flow of drainage.    Wash your hands.    Using one hand, firmly hold the tubing near the insertion site (as close to your skin as the ace wrap and gauze dressing will allow). This will prevent the drain from being pulled out while you are stripping it and from pulling on skin and sutures.    [place] upper/top fingers and milk with the bottom or lower fingers.    Using your index finger and thumb of the other hand, squeeze the tubing below the  first hand. You should squeeze it firmly enough so the tubing becomes flat.     Maintain pressure on the tube, as you are squeezing, slide your index finger and thumb down the tube about 4-6 inches toward the bulb. (use alcohol wipes or lotion to help).    Then, release the hand closest to where the tube is attached to the body (making sure to keep pressure with the other hand), and move upper/anchor hand down. Squeeze, Repeat in segments.    Do not release the pressure you are creating in the tubing until you reach the bulb.  The whole tube will be flat.     If at any time it feels like the tube is pulling away from the skin or starts to hurt STOP! Then start over again more gently.     Strip the drain each time you empty it.

## 2022-02-01 ENCOUNTER — OFFICE VISIT (OUTPATIENT)
Dept: PLASTIC SURGERY | Facility: CLINIC | Age: 42
End: 2022-02-01
Payer: COMMERCIAL

## 2022-02-01 VITALS
HEIGHT: 63 IN | SYSTOLIC BLOOD PRESSURE: 128 MMHG | WEIGHT: 172 LBS | OXYGEN SATURATION: 98 % | BODY MASS INDEX: 30.48 KG/M2 | DIASTOLIC BLOOD PRESSURE: 83 MMHG | HEART RATE: 58 BPM | TEMPERATURE: 97.6 F

## 2022-02-01 DIAGNOSIS — F64.0 GENDER DYSPHORIA IN ADOLESCENT AND ADULT: Primary | ICD-10-CM

## 2022-02-01 PROCEDURE — 99212 OFFICE O/P EST SF 10 MIN: CPT | Performed by: SURGERY

## 2022-02-01 ASSESSMENT — PAIN SCALES - GENERAL: PAINLEVEL: NO PAIN (0)

## 2022-02-01 ASSESSMENT — MIFFLIN-ST. JEOR: SCORE: 1419.32

## 2022-02-01 NOTE — PROGRESS NOTES
PLASTICS PRE-OP  This is a 41 year old biological female who identifies as non-binary who presents for their pre-op visit prior to bilateral simple mastectomy with nipple graft reconstruction scheduled for 02/18/2022. They are here today with Reynaldo who will help with post op cares. The patient has had a negative mammogram, and their letter of support from Cristina Vargas has been received. History and physical were received. COVID test is scheduled for 02/15/2022. Patient is planning on taking 8 weeks off of work after their procedure. Patient requested to have short term disability signed and submitted on the day of their surgery date. Patient had minor concerns about NG size.     Our LPN, Ernestine , discussed periop instructions with the patient including: not eating anything 8 hours prior to surgery, drinking clear liquids up to 2 hours before surgery except for morning medications with a sip of water, the preop shower with surgical soap which was given, and wearing a button- or zip-up shirt on the day of surgery. She also instructed the patient to avoid NSAIDs x 1 week both before AND after surgery, but they may take Tylenol post-op for pain as needed. She also gave the patient a folder with information on dannie-op topics, including where the surgery will be.     I discussed the following with the patient; preop, intraop and postop phases of care on the day of surgery, the placement of a bladder catheter during surgery that will likely be removed in recovery, postop cares and limitations with relation to home and work settings, 5-lb weight restriction for the first 3 weeks postop, and how long to maintain limited activities. We also discussed Zofran, oxycodone, Z-josé miguel, and antipruritics which will be prescribed. We discussed that preventing constipation will be their responsibility, and we discussed methods such as aloe, prune juice or Miralax.     In addition, we went over the possible risks and complications  involved with this elective procedure. These include but are not limited to: infection, bleeding, hematoma/seroma formation, and poor healing (including dehiscence, nipple graft loss, or hypertrophic scarring). We also discussed the possibility of altered chest sensation (either hypo or hypersensitive), residual deformities and asymmetries, possible further surgical revisions, and possible injury to surrounding neurovascular and musculoskeletal structures, including intra-axillary or intra-thoracic. We lastly discussed anesthetic risks including DVT/PE or cardiopulmonary events.     Total time = 15 minutes, spent on the date of encounter doing chart review, history and physical, dressing changes, documentation, patient education, and any further activity as noted above.     This note was prepared on behalf of Fiona Lama MD by Kelly Salas, a trained medical scribe, based on my observations and the provider's statements to me.

## 2022-02-01 NOTE — NURSING NOTE
"Chief Complaint   Patient presents with     GLORIA Smith, is being seen today for a  Pre-op DOS 2/18.       Vitals:    02/01/22 1033   BP: 128/83   BP Location: Left arm   Patient Position: Chair   Cuff Size: Adult Regular   Pulse: 58   Temp: 97.6  F (36.4  C)   TempSrc: Oral   SpO2: 98%   Weight: 78 kg (172 lb)   Height: 1.6 m (5' 3\")       Body mass index is 30.47 kg/m .      Ernestine Pool LPN    "

## 2022-02-01 NOTE — LETTER
2/1/2022       RE: Luis Gutierrez  [address]  [facility] [address]     Dear Colleague,    Thank you for referring your patient, Luis Gutierrez, to the [facility] [place] [facility]. Please see a copy of my visit note below.    PLASTICS PRE-OP  This is a 41 year old biological female who identifies as non-binary who presents for their pre-op visit prior to bilateral simple mastectomy with nipple graft reconstruction scheduled for 02/18/2022. They are here today with Reynaldo who will help with post op cares. The patient has had a negative mammogram, and their letter of support from Cristina Vargas has been received. History and physical were received. COVID test is scheduled for 02/15/2022. Patient is planning on taking 8 weeks off of work after their procedure. Patient requested to have short term disability signed and submitted on the day of their surgery date. Patient had minor concerns about NG size.     Our LPN, Ernestine , discussed periop instructions with the patient including: not eating anything 8 hours prior to surgery, drinking clear liquids up to 2 hours before surgery except for morning medications with a sip of water, the preop shower with surgical soap which was given, and wearing a button- or zip-up shirt on the day of surgery. She also instructed the patient to avoid NSAIDs x 1 week both before AND after surgery, but they may take Tylenol post-op for pain as needed. She also gave the patient a folder with information on dannie-op topics, including where the surgery will be.     I discussed the following with the patient; preop, intraop and postop phases of care on the day of surgery, the placement of a bladder catheter during surgery that will likely be removed in recovery, postop cares and limitations with relation to home and work settings, 5-lb weight restriction for the first 3 weeks postop,  and how long to maintain limited activities. We also discussed Zofran, oxycodone, Z-josé miguel, and antipruritics which will be prescribed. We discussed that preventing constipation will be their responsibility, and we discussed methods such as aloe, prune juice or Miralax.     In addition, we went over the possible risks and complications involved with this elective procedure. These include but are not limited to: infection, bleeding, hematoma/seroma formation, and poor healing (including dehiscence, nipple graft loss, or hypertrophic scarring). We also discussed the possibility of altered chest sensation (either hypo or hypersensitive), residual deformities and asymmetries, possible further surgical revisions, and possible injury to surrounding neurovascular and musculoskeletal structures, including intra-axillary or intra-thoracic. We lastly discussed anesthetic risks including DVT/PE or cardiopulmonary events.     Total time = 15 minutes, spent on the date of encounter doing chart review, history and physical, dressing changes, documentation, patient education, and any further activity as noted above.     This note was prepared on behalf of Fiona Lama MD by Kelly Salas, a trained medical scribe, based on my observations and the provider's statements to me.       Again, thank you for allowing me to participate in the care of your patient.      Sincerely,    Fiona Lama MD

## 2022-02-04 DIAGNOSIS — Z11.59 ENCOUNTER FOR SCREENING FOR OTHER VIRAL DISEASES: Primary | ICD-10-CM

## 2022-02-15 ENCOUNTER — LAB (OUTPATIENT)
Dept: LAB | Facility: CLINIC | Age: 42
End: 2022-02-15
Attending: SURGERY

## 2022-02-15 DIAGNOSIS — Z11.59 ENCOUNTER FOR SCREENING FOR OTHER VIRAL DISEASES: ICD-10-CM

## 2022-02-15 LAB — SARS-COV-2 RNA RESP QL NAA+PROBE: NEGATIVE

## 2022-02-15 PROCEDURE — U0005 INFEC AGEN DETEC AMPLI PROBE: HCPCS | Performed by: SURGERY

## 2022-02-17 ENCOUNTER — ANESTHESIA EVENT (OUTPATIENT)
Dept: SURGERY | Facility: AMBULATORY SURGERY CENTER | Age: 42
End: 2022-02-17
Payer: COMMERCIAL

## 2022-02-17 PROBLEM — E78.5 DYSLIPIDEMIA: Status: ACTIVE | Noted: 2021-02-18

## 2022-02-17 RX ORDER — FENTANYL CITRATE 50 UG/ML
25 INJECTION, SOLUTION INTRAMUSCULAR; INTRAVENOUS
Status: CANCELLED | OUTPATIENT
Start: 2022-02-17

## 2022-02-18 ENCOUNTER — HOSPITAL ENCOUNTER (OUTPATIENT)
Facility: AMBULATORY SURGERY CENTER | Age: 42
End: 2022-02-18
Attending: SURGERY
Payer: COMMERCIAL

## 2022-02-18 ENCOUNTER — ANESTHESIA (OUTPATIENT)
Dept: SURGERY | Facility: AMBULATORY SURGERY CENTER | Age: 42
End: 2022-02-18
Payer: COMMERCIAL

## 2022-02-18 VITALS
RESPIRATION RATE: 12 BRPM | WEIGHT: 172 LBS | DIASTOLIC BLOOD PRESSURE: 99 MMHG | HEIGHT: 63 IN | BODY MASS INDEX: 30.48 KG/M2 | TEMPERATURE: 97.7 F | HEART RATE: 57 BPM | OXYGEN SATURATION: 99 % | SYSTOLIC BLOOD PRESSURE: 131 MMHG

## 2022-02-18 DIAGNOSIS — Z90.13 S/P BILATERAL MASTECTOMY: Primary | ICD-10-CM

## 2022-02-18 LAB
HCG UR QL: NEGATIVE
INTERNAL QC OK POCT: NORMAL
POCT KIT EXPIRATION DATE: NORMAL
POCT KIT LOT NUMBER: NORMAL

## 2022-02-18 PROCEDURE — 19303 MAST SIMPLE COMPLETE: CPT | Mod: 50 | Performed by: SURGERY

## 2022-02-18 PROCEDURE — 88305 TISSUE EXAM BY PATHOLOGIST: CPT | Mod: TC | Performed by: SURGERY

## 2022-02-18 PROCEDURE — 19350 NIPPLE/AREOLA RECONSTRUCTION: CPT | Mod: 50

## 2022-02-18 PROCEDURE — 19350 NIPPLE/AREOLA RECONSTRUCTION: CPT | Mod: 50 | Performed by: SURGERY

## 2022-02-18 PROCEDURE — 19303 MAST SIMPLE COMPLETE: CPT | Mod: 50

## 2022-02-18 PROCEDURE — 81025 URINE PREGNANCY TEST: CPT | Performed by: PATHOLOGY

## 2022-02-18 RX ORDER — GLYCOPYRROLATE 0.2 MG/ML
INJECTION, SOLUTION INTRAMUSCULAR; INTRAVENOUS PRN
Status: DISCONTINUED | OUTPATIENT
Start: 2022-02-18 | End: 2022-02-18

## 2022-02-18 RX ORDER — AZITHROMYCIN 250 MG/1
250 TABLET, FILM COATED ORAL DAILY
Qty: 6 TABLET | Refills: 0 | Status: SHIPPED | OUTPATIENT
Start: 2022-02-18 | End: 2022-08-06

## 2022-02-18 RX ORDER — DEXAMETHASONE SODIUM PHOSPHATE 4 MG/ML
INJECTION, SOLUTION INTRA-ARTICULAR; INTRALESIONAL; INTRAMUSCULAR; INTRAVENOUS; SOFT TISSUE PRN
Status: DISCONTINUED | OUTPATIENT
Start: 2022-02-18 | End: 2022-02-18

## 2022-02-18 RX ORDER — PROPOFOL 10 MG/ML
INJECTION, EMULSION INTRAVENOUS CONTINUOUS PRN
Status: DISCONTINUED | OUTPATIENT
Start: 2022-02-18 | End: 2022-02-18

## 2022-02-18 RX ORDER — LIDOCAINE HYDROCHLORIDE 10 MG/ML
INJECTION, SOLUTION INFILTRATION; PERINEURAL PRN
Status: DISCONTINUED | OUTPATIENT
Start: 2022-02-18 | End: 2022-02-18

## 2022-02-18 RX ORDER — ONDANSETRON 4 MG/1
4 TABLET, ORALLY DISINTEGRATING ORAL EVERY 8 HOURS PRN
Qty: 20 TABLET | Refills: 1 | Status: SHIPPED | OUTPATIENT
Start: 2022-02-18 | End: 2022-08-06

## 2022-02-18 RX ORDER — HYDROXYZINE HYDROCHLORIDE 25 MG/1
25 TABLET, FILM COATED ORAL 3 TIMES DAILY PRN
Qty: 20 TABLET | Refills: 1 | Status: SHIPPED | OUTPATIENT
Start: 2022-02-18 | End: 2022-08-06

## 2022-02-18 RX ORDER — FENTANYL CITRATE 50 UG/ML
INJECTION, SOLUTION INTRAMUSCULAR; INTRAVENOUS PRN
Status: DISCONTINUED | OUTPATIENT
Start: 2022-02-18 | End: 2022-02-18

## 2022-02-18 RX ORDER — ONDANSETRON 2 MG/ML
INJECTION INTRAMUSCULAR; INTRAVENOUS PRN
Status: DISCONTINUED | OUTPATIENT
Start: 2022-02-18 | End: 2022-02-18

## 2022-02-18 RX ORDER — ACETAMINOPHEN 325 MG/1
975 TABLET ORAL ONCE
Status: COMPLETED | OUTPATIENT
Start: 2022-02-18 | End: 2022-02-18

## 2022-02-18 RX ORDER — ONDANSETRON 2 MG/ML
4 INJECTION INTRAMUSCULAR; INTRAVENOUS EVERY 30 MIN PRN
Status: DISCONTINUED | OUTPATIENT
Start: 2022-02-18 | End: 2022-02-19 | Stop reason: HOSPADM

## 2022-02-18 RX ORDER — HYDROMORPHONE HYDROCHLORIDE 1 MG/ML
0.2 INJECTION, SOLUTION INTRAMUSCULAR; INTRAVENOUS; SUBCUTANEOUS EVERY 5 MIN PRN
Status: DISCONTINUED | OUTPATIENT
Start: 2022-02-18 | End: 2022-02-19 | Stop reason: HOSPADM

## 2022-02-18 RX ORDER — OXYCODONE HYDROCHLORIDE 5 MG/1
5 TABLET ORAL EVERY 4 HOURS PRN
Status: DISCONTINUED | OUTPATIENT
Start: 2022-02-18 | End: 2022-02-19 | Stop reason: HOSPADM

## 2022-02-18 RX ORDER — SODIUM CHLORIDE, SODIUM LACTATE, POTASSIUM CHLORIDE, CALCIUM CHLORIDE 600; 310; 30; 20 MG/100ML; MG/100ML; MG/100ML; MG/100ML
INJECTION, SOLUTION INTRAVENOUS CONTINUOUS
Status: DISCONTINUED | OUTPATIENT
Start: 2022-02-18 | End: 2022-02-19 | Stop reason: HOSPADM

## 2022-02-18 RX ORDER — ONDANSETRON 4 MG/1
4 TABLET, ORALLY DISINTEGRATING ORAL EVERY 30 MIN PRN
Status: DISCONTINUED | OUTPATIENT
Start: 2022-02-18 | End: 2022-02-19 | Stop reason: HOSPADM

## 2022-02-18 RX ORDER — FENTANYL CITRATE 50 UG/ML
25 INJECTION, SOLUTION INTRAMUSCULAR; INTRAVENOUS EVERY 5 MIN PRN
Status: DISCONTINUED | OUTPATIENT
Start: 2022-02-18 | End: 2022-02-19 | Stop reason: HOSPADM

## 2022-02-18 RX ORDER — MEPERIDINE HYDROCHLORIDE 25 MG/ML
12.5 INJECTION INTRAMUSCULAR; INTRAVENOUS; SUBCUTANEOUS
Status: DISCONTINUED | OUTPATIENT
Start: 2022-02-18 | End: 2022-02-19 | Stop reason: HOSPADM

## 2022-02-18 RX ORDER — OXYCODONE HYDROCHLORIDE 5 MG/1
5 TABLET ORAL EVERY 6 HOURS PRN
Qty: 12 TABLET | Refills: 0 | Status: SHIPPED | OUTPATIENT
Start: 2022-02-18 | End: 2022-02-21

## 2022-02-18 RX ORDER — PROPOFOL 10 MG/ML
INJECTION, EMULSION INTRAVENOUS PRN
Status: DISCONTINUED | OUTPATIENT
Start: 2022-02-18 | End: 2022-02-18

## 2022-02-18 RX ORDER — LIDOCAINE 40 MG/G
CREAM TOPICAL
Status: DISCONTINUED | OUTPATIENT
Start: 2022-02-18 | End: 2022-02-19 | Stop reason: HOSPADM

## 2022-02-18 RX ORDER — CEFAZOLIN SODIUM 2 G/50ML
2 SOLUTION INTRAVENOUS SEE ADMIN INSTRUCTIONS
Status: DISCONTINUED | OUTPATIENT
Start: 2022-02-18 | End: 2022-02-19 | Stop reason: HOSPADM

## 2022-02-18 RX ORDER — CEFAZOLIN SODIUM 2 G/50ML
2 SOLUTION INTRAVENOUS
Status: COMPLETED | OUTPATIENT
Start: 2022-02-18 | End: 2022-02-18

## 2022-02-18 RX ORDER — BUPIVACAINE HYDROCHLORIDE 2.5 MG/ML
INJECTION, SOLUTION INFILTRATION; PERINEURAL PRN
Status: DISCONTINUED | OUTPATIENT
Start: 2022-02-18 | End: 2022-02-18 | Stop reason: HOSPADM

## 2022-02-18 RX ADMIN — GLYCOPYRROLATE 0.1 MG: 0.2 INJECTION, SOLUTION INTRAMUSCULAR; INTRAVENOUS at 07:20

## 2022-02-18 RX ADMIN — CEFAZOLIN SODIUM 2 G: 2 SOLUTION INTRAVENOUS at 07:20

## 2022-02-18 RX ADMIN — ACETAMINOPHEN 975 MG: 325 TABLET ORAL at 06:25

## 2022-02-18 RX ADMIN — GLYCOPYRROLATE 0.1 MG: 0.2 INJECTION, SOLUTION INTRAMUSCULAR; INTRAVENOUS at 07:25

## 2022-02-18 RX ADMIN — ONDANSETRON 4 MG: 2 INJECTION INTRAMUSCULAR; INTRAVENOUS at 09:34

## 2022-02-18 RX ADMIN — PROPOFOL 170 MG: 10 INJECTION, EMULSION INTRAVENOUS at 07:25

## 2022-02-18 RX ADMIN — SODIUM CHLORIDE, SODIUM LACTATE, POTASSIUM CHLORIDE, CALCIUM CHLORIDE: 600; 310; 30; 20 INJECTION, SOLUTION INTRAVENOUS at 09:44

## 2022-02-18 RX ADMIN — DEXAMETHASONE SODIUM PHOSPHATE 8 MG: 4 INJECTION, SOLUTION INTRA-ARTICULAR; INTRALESIONAL; INTRAMUSCULAR; INTRAVENOUS; SOFT TISSUE at 07:25

## 2022-02-18 RX ADMIN — LIDOCAINE HYDROCHLORIDE 100 MG: 10 INJECTION, SOLUTION INFILTRATION; PERINEURAL at 07:25

## 2022-02-18 RX ADMIN — FENTANYL CITRATE 100 MCG: 50 INJECTION, SOLUTION INTRAMUSCULAR; INTRAVENOUS at 07:25

## 2022-02-18 RX ADMIN — FENTANYL CITRATE 100 MCG: 50 INJECTION, SOLUTION INTRAMUSCULAR; INTRAVENOUS at 07:47

## 2022-02-18 RX ADMIN — SODIUM CHLORIDE, SODIUM LACTATE, POTASSIUM CHLORIDE, CALCIUM CHLORIDE: 600; 310; 30; 20 INJECTION, SOLUTION INTRAVENOUS at 06:25

## 2022-02-18 RX ADMIN — PROPOFOL 150 MCG/KG/MIN: 10 INJECTION, EMULSION INTRAVENOUS at 07:25

## 2022-02-18 RX ADMIN — OXYCODONE HYDROCHLORIDE 5 MG: 5 TABLET ORAL at 10:28

## 2022-02-18 ASSESSMENT — LIFESTYLE VARIABLES: TOBACCO_USE: 1

## 2022-02-18 NOTE — BRIEF OP NOTE
[facility] Brief Operative Note    Pre-operative diagnosis: Gender dysphoria in adolescent and adult [F64.0]   Post-operative diagnosis same   Procedure: Procedure(s):  MASTECTOMY, BILATERAL, SIMPLE, WITH NIPPLE GRAFTS, ONQ   Surgeon(s): Surgeon(s) and Role:     * Fiona Lama MD - Primary     * Ritesh Bains MD - Assisting   IVFs: 700  UO; not measured   Estimated blood loss: * No values recorded between 2/18/2022  7:48 AM and 2/18/2022 10:06 AM * 25 mls EBL   Specimens: ID Type Source Tests Collected by Time Destination   1 : Left Breast tissue Mastectomy, Simple Breast, Left SURGICAL PATHOLOGY EXAM Fiona Lama MD 2/18/2022  9:24 AM    2 : Right breast tissue Mastectomy, Simple Breast, Right SURGICAL PATHOLOGY EXAM Fiona Lama MD 2/18/2022  9:24 AM       Findings: R breast = 361 gms, L breast 276 gms. No abnormalities noted.

## 2022-02-18 NOTE — ANESTHESIA PREPROCEDURE EVALUATION
Anesthesia Pre-Procedure Evaluation    Patient: Luis Gutierrez   MRN: [id number] : 1980        Preoperative Diagnosis: Gender dysphoria in adolescent and adult [F64.0]    Procedure : Procedure(s):  MASTECTOMY, BILATERAL, SIMPLE, possible nipple grafts. OnQ          Past Medical History:   Diagnosis Date     Hypertension 5/10/12    Preeclampsia      Past Surgical History:   Procedure Laterality Date      SECTION  2012      No Known Allergies   Social History     Tobacco Use     Smoking status: Former Smoker     Packs/day: 0.15     Years: 20.00     Pack years: 3.00     Types: Cigarettes     Start date: 2000     Quit date: 2020     Years since quittin.2     Smokeless tobacco: Never Used   Substance Use Topics     Alcohol use: Yes      Wt Readings from Last 1 Encounters:   22 78 kg (172 lb)        Anesthesia Evaluation   Pt has had prior anesthetic. Type: General and Regional.    No history of anesthetic complications       ROS/MED HX  ENT/Pulmonary:     (+) tobacco use, Past use,     Neurologic:  - neg neurologic ROS     Cardiovascular:     (+) Dyslipidemia hypertension----- (-) murmur   METS/Exercise Tolerance: >4 METS    Hematologic:    (-) anemia   Musculoskeletal:  - neg musculoskeletal ROS     GI/Hepatic:  - neg GI/hepatic ROS     Renal/Genitourinary:  - neg Renal ROS     Endo: Comment: On testosterone therapy      Psychiatric/Substance Use:     (+) psychiatric history     Infectious Disease:  - neg infectious disease ROS     Malignancy:  - neg malignancy ROS     Other:     (-) Any chance pregnant       Physical Exam    Airway        Mallampati: II   TM distance: > 3 FB   Neck ROM: full   Mouth opening: > 3 cm    Respiratory Devices and Support         Dental  no notable dental history         Cardiovascular          Rhythm and rate: regular and normal (-) no murmur    Pulmonary   pulmonary exam normal        breath sounds clear to auscultation           OUTSIDE LABS:  CBC:    Lab Results   Component Value Date    WBC 7.2 02/15/2021    HGB 14.2 04/19/2021    HGB 14.0 02/15/2021    HCT 43.1 04/19/2021    HCT 43.0 02/15/2021     02/15/2021     BMP:   Lab Results   Component Value Date     10/11/2021     06/07/2021    POTASSIUM 3.6 10/11/2021    POTASSIUM 3.8 06/07/2021    CHLORIDE 107 10/11/2021    CHLORIDE 106 06/07/2021    CO2 26 10/11/2021    CO2 23 06/07/2021    BUN 11 10/11/2021    BUN 14 06/07/2021    CR 1.02 10/11/2021    CR 0.91 06/07/2021    GLC 86 10/11/2021    GLC 75 06/07/2021     COAGS: No results found for: PTT, INR, FIBR  POC:   Lab Results   Component Value Date    HCG Negative 02/18/2022     HEPATIC:   Lab Results   Component Value Date    ALBUMIN 3.3 (L) 04/19/2021    PROTTOTAL 6.4 (L) 04/19/2021    ALT 21 04/19/2021    AST 18 04/19/2021    ALKPHOS 50 04/19/2021    BILITOTAL 0.3 04/19/2021     OTHER:   Lab Results   Component Value Date    JAIMIE 8.7 10/11/2021       Anesthesia Plan    ASA Status:  2   NPO Status:  NPO Appropriate    Anesthesia Type: General.     - Airway: ETT   Induction: Intravenous, Propofol.   Maintenance: Balanced.        Consents    Anesthesia Plan(s) and associated risks, benefits, and realistic alternatives discussed. Questions answered and patient/representative(s) expressed understanding.    - Discussed:     - Discussed with:  Patient      - Specific Concerns: sore throat, uncommon dental damage, post op pain/nausea, rare major complciations.     - Extended Intubation/Ventilatory Support Discussed: No.      - Patient is DNR/DNI Status: No    Use of blood products discussed: No .     Postoperative Care    Pain management: IV analgesics, Oral pain medications, Peripheral nerve block (Continuous), Multi-modal analgesia (OnQ by surgeon).   PONV prophylaxis: Ondansetron (or other 5HT-3), Dexamethasone or Solumedrol, Background Propofol Infusion     Comments:    Other Comments: PMH: past tobacco use, on testosterone therapy for gender  dysphoria. Discussed plan for general anesthetic with oral ETT. Patient voiced understanding of plan and had no further questions.             Vincent Nj MD

## 2022-02-18 NOTE — ANESTHESIA CARE TRANSFER NOTE
Patient: Luis Gutierrez    Procedure: Procedure(s):  MASTECTOMY, BILATERAL, SIMPLE, WITH NIPPLE GRAFTS, ONQ       Diagnosis: Gender dysphoria in adolescent and adult [F64.0]  Diagnosis Additional Information: No value filed.    Anesthesia Type:   General     Note:    Oropharynx: oropharynx clear of all foreign objects and spontaneously breathing  Level of Consciousness: drowsy  Oxygen Supplementation: face mask  Level of Supplemental Oxygen (L/min / FiO2): 6  Independent Airway: airway patency satisfactory and stable  Dentition: dentition unchanged  Vital Signs Stable: post-procedure vital signs reviewed and stable  Report to RN Given: handoff report given  Patient transferred to: PACU    Handoff Report: Identifed the Patient, Identified the Reponsible Provider, Reviewed the pertinent medical history, Discussed the surgical course, Reviewed Intra-OP anesthesia mangement and issues during anesthesia, Set expectations for post-procedure period and Allowed opportunity for questions and acknowledgement of understanding      Vitals:  Vitals Value Taken Time   /83 02/18/22 1008   Temp 36.4  C (97.6  F) 02/18/22 1008   Pulse 125 02/18/22 1010   Resp 8 02/18/22 1011   SpO2 100 % 02/18/22 1011   Vitals shown include unvalidated device data.    Electronically Signed By: ERMELINDA Stockton CRNA  February 18, 2022  10:12 AM

## 2022-02-18 NOTE — DISCHARGE INSTRUCTIONS
"[facility]  Home Care Following Anesthesia  For 24 hours after surgery:  1. Get plenty of rest.  A responsible adult must stay with you for at least 24 hours after you leave the surgery center.  2. Do not drive or use heavy equipment.  If you have weakness or tingling, don't drive or use heavy equipment until this feeling goes away.   3. Do not drink alcohol.   4. Avoid strenuous or risky activities.  Ask for help when climbing stairs.  5. You may feel lightheaded.  IF so, sit for a few minutes before standing.  Have someone help you get up.   6. If you have nausea (feel sick to your stomach): Drink only clear liquids such as apple juice, ginger ale, broth or 7-Up.  Rest may also help.  Be sure to drink enough fluids.  Move to a regular diet as you feel able.   7. You may have a slight fever.  Call the doctor if your fever is over 100 F (37.7 C) (taken under the tongue) or lasts longer than 24 hours.  8. You may have a dry mouth, a sore throat, muscle aches or trouble sleeping. These should go away after 24 hours.  9. Do not make important or legal decisions.   10. It is recommended to avoid smoking.   If you use hormonal birth control (such as the pill, patch, ring or implants):  You will need a second form of birth control for 7 days (condoms, a diaphragm or contraceptive foam).  While in the surgery center, you received a medicine called Sugammadex.  Hormonal birth control (such as the pill, patch, ring or implants) will not work as well for a week after taking this medicine.  Today you received a Marcaine or bupivacaine block to numb the nerves near your surgery site.  This is a block using local anesthetic or \"numbing\" medication injected around the nerves to anesthetize or \"numb\" the area supplied by those nerves.  This block is injected into the muscle layer near your surgical site.  The medication may numb the location where you had surgery for 6-18 hours, but may last " up to 24 hours.  If your surgical site is an arm or leg you should be careful with your affected limb, since it is possible to injure your limb without being aware of it due to the numbing.  Until full feeling returns, you should guard against bumping or hitting your limb, and avoid extreme hot or cold temperatures on the skin.  As the block wears off, the feeling will return as a tingling or prickly sensation near your surgical site.  You will experience more discomfort from your incision as the feeling returns.  You may want to take a pain pill (a narcotic or Tylenol if this was prescribed by your surgeon) when you start to experience mild pain before the pain beccomes more severe.  If your pain medications do not control your pain you should notifiy your surgeon.    Tips for taking pain medications  To get the best pain relief possible, remember these points:    Take pain medications as directed, before pain becomes severe.    Pain medication can upset your stomach: taking it with food may help.    Constipation is a common side effect of pain medication. Drink plenty of  fluids.    Eat foods high in fiber. Take a stool softener if recommended by your doctor or pharmacist.    Do not drink alcohol, drive or operate machinery while taking pain medications.    Ask about other ways to control pain, such as with heat, ice or relaxation.    Tylenol/Acetaminophen Consumption  To help encourage the safe use of acetaminophen, the makers of TYLENOL  have lowered the maximum daily dose for single-ingredient Extra Strength TYLENOL  (acetaminophen) products sold in the [place] from 8 pills per day (4,000 mg) to 6 pills per day (3,000 mg). The dosing interval has also changed from 2 pills every 4-6 hours to 2 pills every 6 hours.    If you feel your pain relief is insufficient, you may take Tylenol/Acetaminophen in addition to your narcotic pain medication.     Be careful not to exceed 3,000 mg of Tylenol/Acetaminophen in a 24  hour period from all sources.    If you are taking extra strength Tylenol/acetaminophen (500 mg), the maximum dose is 6 tablets in 24 hours.    If you are taking regular strength acetaminophen (325 mg), the maximum dose is 9 tablets in 24 hours.    Call a doctor for any of the followin. Signs of infection (fever, growing tenderness at the surgery site, a large amount of drainage or bleeding, severe pain, foul-smelling drainage, redness, swelling).  2. It has been over 8 to 10 hours since surgery and you are still not able to urinate (pass water).  3. Headache for over 24 hours.  4. Numbness, tingling or weakness the day after surgery (if you had spinal anesthesia).  5. Signs of Covid-19 infection (temperature over 100 degrees, shortness of breath, cough, loss of taste/smell, generalized body aches, persistent headache, chills, sore throat, nausea/vomiting/diarrhea)  Your doctor is:  Dr. Fiona Lama, Plastic Surgery: [phone]  Or dial [phone] and ask for the resident on call for:  Plastics  For emergency care, call the:  [facility]:  [phone] (TTY for hearing impaired: [phone])  ON-Q  C-bloc Continuous Nerve Block Discharge Instructions    The Nerve Block      Your anesthesiologist performed a nerve block (a procedure that blocks pain to only a specific area) by inserting a small tube in your body.  This tube is connected to a pump that will help control your pain.    The pump is shaped like a balloon and is filled with medicine that causes numbness or loss of sensation to help control your pain around the incision or site of injury.  The pain pump contains local anesthetic and DOES NOT contain opioids.    The medicine in the pump may alter your ability to feel changes in temperature or pressure.  Your doctor will tell you if any special precautions apply to you.  The Pump      DO NOT SQUEEZE THE PUMP.     The pump delivers medicine at a very slow rate.    You will  NOT see the medicine moving through the tubing.    As the medicine is delivered, the pump ball will slowly become smaller.    It may take a day or so before you notice a change in the size and look of the pump.    Depending on the size of your pump, it may take 2 - 5 days to give all the medicine.    The middle part of the pump may look like an apple core when empty.      Photo used with permission from [employer].    Managing Your Pain    The Medicine and Infusion Rate is:  ______________________________      The continuous nerve block infusion may not block all of the pain from your surgery so it is important that you take the pain medicines prescribed by your surgeon if you need them.    If you continue to have difficulty with your pain control, please contact the anesthesiologist.    Caring for Your Pump at Home      Wear the pump on the outside of clothing - away from your skin and cold therapy (ice packs).  The delivery rate is accurate only at room temperature.    Make sure the white clamp on the tubing remains open (moves freely on the tubing).    Make sure there are no kinks in the tubing.    Do not tape or cover up the filter.    Protect the pump from sunlight and heat.    When sleeping:   o Do not place the pump underneath the bed covers where the pump may become too warm.  o DO NOT place the pump on the floor or hang the pump on a bed post as these situations may cause the tubing to get tangled and get pulled out.    Bathing/Showering:    o We recommend taking sponge baths until the pump is removed.  o It is important to not get the area where the tube enters your body wet.    It is normal for a small amount of fluid to leak from the hole in your body where the tube is inserted.  If this occurs, reinforce the bandage with another bandage.  The Infusion    Do not turn the infusion off unless your anesthesiologist has told you to do so.    Do not change the flow rate of the infusion unless your  anesthesiologist told you to do so.  Changing the flow rate without your doctor s instruction may result in the wrong dose of medicine, which could cause serious injury.    If your anesthesiologist told you to change the rate of your infusion:  o Flip open the clear cover.  o Turn the white key on the select-a-flow dial to the instructed rate.  (The rate of the infusion should line up with the black arrow at the top of the controller.)    o Listen for the click when you move the dial.    Photo used with permission from [employer].    Removing the Tubing    When the pump is empty or if you have been told to stop the infusion you can remove the tubing.  Follow these steps:  o Wash your hands.  o Clamp the tubing (squeeze the white clamp until you hear or feel a click).  o Remove the clear dressing that covers the tubing.  o Grasp the tubing close to the skin and gently pull.  If you meet resistance, STOP pulling and page or call your anesthesiologist.  o DO NOT cut or forcefully remove the tubing.  o After removal, check the end of the tubing for a dark tip.  If you DO NOT see a dark tip, contact your anesthesiologist.  o Apply firm pressure over the site until oozing stops.  Wash the area with soap and water, dry with a clean towel and then cover with a bandage.    The pump is not reusable or refillable.  Dispose of it in the trash and wash your hands.   Troubleshooting    Tubing Comes Out From Skin:  If the tube accidentally comes out, check the end of the tubing for a dark tip.    If you see a dark tip simply discard it and use the pain pills prescribed to you by your surgeon.    If you DON T see a dark tip, contact your anesthesiologist.    Tubing Disconnection:  If the tubing accidentally becomes disconnected from the pump, DO NOT reconnect the pump to the tubing.  It may have been contaminated with germs.  Close the tubing clamp and immediately contact your anesthesiologist.    Fluid Leaking:  If enough  fluid is leaking from the pump or the tubing outside your body to soak through the dressing, close the white clamp on the tubing and contact your anesthesiologist.    Immediately report the following to your anesthesiologist:    Redness, warmth, swelling, or tenderness at the site the tubing was inserted    Increase in pain    Fever, chills, sweats    Bowel or bladder changes    Difficulty breathing    Dizziness, lightheadedness    Blurred vision    Ringing or buzzing in your ears    Metal taste in your mouth    Numbness and/or tingling around your mouth, fingers or toes    Drowsiness    Confusion    Trouble removing the tubing    Dark tip is not present when tubing is removed      Contact information for notifying your Anesthesiologist  o 1st Call:  [phone], and then enter [phone].  You will be prompted to enter your phone number and then the # sign.  Someone from the [facility] will call you back.  o If no answer, Call:  [phone].  Ask to speak to the anesthesiologist on call for the [facility].     Caring for Your Hugh-Das Drain    You have been discharged with a Hugh-Das drainage tube. This tube drains fluid from your incision, helping prevent swelling and reducing the risk for infection. The tube is held in place by a few stitches. The drain will be removed when your doctor determines you no longer need it and when the amount of drainage decreases. The color and amount of fluid varies. Right after surgery, the fluid may be bright red and may become clearer over time.   Dressing:    Keep the skin around the tube dry.    If you have a dressing, change it every day.   o Wash your hands.  o Remove the old bandage. Do not use scissors-you may accidentally cut the tube.  o Check for any redness, swelling, drainage, or broken stitches. (Call your doctor with any of these findings).  o Wash your hands again.  o Wet a cotton swab (Q-tip) and clean around the  incision and the tube site. Use normal saline solution (salt and water) or soap and water. Start at the tube site and move outward in a circular motion.   o Pat dry.  o Put a new bandage on the incision and tube site. Make the bandage large enough to cover the whole incision area.   o Tape the bandage in place.  o Throw out old materials and wash your hands.   Home Care:    Tape the tube to the skin below the bandage. Make sure to keep some slack in the tube to keep it from pulling out.     DO NOT sleep on the same side as the tube.    Secure the tube and bulb inside your clothing with a safety pin. This helps keep the tube from being pulled out.     Keep the bulb compressed at all times, except when you empty it.    Empty your drain at least twice a day. Empty it more often if the drain is full.   o Lift the opening of the drain.  o Drain the fluid into a measuring cup.  o Record the amount of fluid each time you empty. Share the information with your doctor at your follow-up visit.   o Squeeze the bulb with your hands until you hear air coming out of the bulb.  o Close the opening.     Tape plastic wrap over the bandage and tube site when you shower.      Stripping  the tube helps keep blood clots from blocking the tube.                         ONLY DO THIS IF YOUR DOCTOR INSTRUCTED YOU TO DO SO!  o Hold the tubing where it leaves the skin with one hand. This keeps it from pulling on the skin.  o Pinch the tubing with the thumb and first finger of your other hand.   o Slowly and firmly pull your thumb and first finger down the tube (squeezing the tube between your fingers). Keep squeezing the tube as you run your fingers towards the bulb. If the pulling hurts or feels like it is coming out of the skin, STOP. Begin again more gently.  o Let go of the tubing with both hands. If the tube is still blocked, repeat these steps three or four times. Make sure that the bulb is compressed so it creates suction.  When to  "call your doctor:    New or increased pain around the tube    Redness, warmth, or swelling around the incision or tube    Drainage that is foul smelling    Vomiting    Fever over 101 F degrees    Fluid leaking around the tube    Incision seems not to be healing    Stitches become loose    The tube falls out    Drainage that changes from light pick to dark red    A sudden increase or decrease in the amount of drainage (over 30 ml).  Your drainage record:  Date Time Bulb 1: Amount of drainage (ml or cc) Bulb 2: Amount of drainage (ml or cc) Notes                                                                                    Activity Instructions   5lbs for 3 weeks. AVOID excessive/extreme use of upper body/extremities for 3 weeks. OK to do gentle movements for daily cares. AVOID direct trauma to surgical sites. OK to sleep on your back or modified side position (may be uncomfortable with incisions and drains on sides) CANNOT sleep on stomach for at least first 2 weeks. May want to consider sleeping with pillows to prevent from rolling over. Some patients prefer to sleep in a recliner to prevent rolling but elevation is not required., Sign\" class=\"_ssCheckboxDisplayName\" id=\"[id number]\"Activity   5lbs for 3 weeks. AVOID excessive/extreme use of upper body/extremities for 3 weeks. OK to do gentle movements for daily cares. AVOID direct trauma to surgical sites. OK to sleep on your back or modified side position (may be uncomfortable with incisions and drains on sides) CANNOT sleep on stomach for at least first 2 weeks. May want to consider sleeping with pillows to prevent from rolling over. Some patients prefer to sleep in a recliner to prevent rolling but elevation is not required., Sign\"Routine  Is discharge order? Yes  Activity: -no heavy lifting >5lbs for 3 weeks. AVOID excessive/extreme use of upper body/extremities for 3 weeks. OK to do gentle movements for daily cares. " "AVOID direct trauma to surgical sites. OK to sleep on your back or modified side position (may be uncomfortable with incisions and drains on sides) CANNOT sleep on stomach for at least first 2 weeks. May want to consider sleeping with pillows to prevent from rolling over. Some patients prefer to sleep in a recliner to prevent rolling but elevation is not required., Sign   Diet Instructions   Diet   Qty-1, Normal, Routine, Diet: -advance to regular diet as tolerated. Drink plenty of fluids to help prevent constipation. Get plenty of protein, vitamins and minerals (fruits and veggies) to help with healing. Can resume usual preop meds and supplements as tolerated., Sign   Monitor and record   102.5F, increased bleeding noted in drains or under skin, reactions to meds, reactions to pain pump (tingling around lips or ringing in ears), any problems with drains/pain catheters/or dressings,    When to contact your care team   102.5F, increased bleeding noted in drains or under skin, reactions to meds, reactions to pain pump (tingling around lips or ringing in ears), any problems with drains/pain catheters/or dressings, Sign\"Routine, IF you need to contact your care team: start with a [employer] message if during normal business hours. If the issue is more urgent, -call plastic surgery clinic RN during daytime hours ([facility] [phone]) or the hospital  for nights/weekends ([phone]) and ask to speak with the plastic surgery resident on call IF you have any of the following: temperature >102.5F, increased bleeding noted in drains or under skin, reactions to meds, reactions to pain pump (tingling around lips or ringing in ears), any problems with drains/pain catheters/or dressings, Sign   Wound care and dressings   Routine, Instructions to care for your wound at home: as directed. Wound care/Dressings: -keep nipple graft dressing DRY, NO SHOWERS until after follow up appointment. Ok to rewrap ACE if too tight or too " "loose. Do NOT mess with dressings underneath ace wrap or nipple graft dressings. NO ICE PACKS or HEATING PADS due to altered skin sensation., Sign   Tubes and drains   Routine, You are going home with the following tubes or drains: MARYLU. Follow these instructions to care for your tube Drain Care: -Empty, STRIP and record MARYLU output EVERY morning and EVERY evening. Bring your drain log to your follow up appointment, this is needed to know if we can removed your drains. Usually between 30-50mls per day but don't be alarmed if more or less. May be unequal between sides. Will probably drop off when pain pump is empty. Usually fluid looks like cherry koolaid at first, then hawaiian punch color, then peach tea over time. May notice protein \"clots\" that look like \"worms\" in the tubing. Do not be alarmed, is I just precipitated protein from the fluid that is weeping from the tissue, much like what you see when you scrape your knee. We DO want to be notified if 1.) there is increasing amount of fresh, bright red blood that rapidly fills the suction bulb after emptying or 2.) if blood collecting under the skin and causing significant painful swelling on one side (expanding hematoma), Sign       "

## 2022-02-18 NOTE — ANESTHESIA POSTPROCEDURE EVALUATION
Patient: Luis Gutierrez    Procedure: Procedure(s):  MASTECTOMY, BILATERAL, SIMPLE, WITH NIPPLE GRAFTS, ONQ       Diagnosis:Gender dysphoria in adolescent and adult [F64.0]  Diagnosis Additional Information: No value filed.    Anesthesia Type:  General    Note:  Disposition: Outpatient   Postop Pain Control: Uneventful            Sign Out: Well controlled pain   PONV: No   Neuro/Psych: Uneventful            Sign Out: Acceptable/Baseline neuro status   Airway/Respiratory: Uneventful            Sign Out: Acceptable/Baseline resp. status   CV/Hemodynamics: Uneventful            Sign Out: Acceptable CV status; No obvious hypovolemia; No obvious fluid overload   Other NRE: NONE   DID A NON-ROUTINE EVENT OCCUR? No           Last vitals:  Vitals Value Taken Time   /94 02/18/22 1030   Temp 36.4  C (97.6  F) 02/18/22 1008   Pulse 61 02/18/22 1030   Resp 5 02/18/22 1030   SpO2 98 % 02/18/22 1030   Vitals shown include unvalidated device data.    Electronically Signed By: Vincent Nj MD  February 18, 2022  11:58 AM

## 2022-02-22 LAB
PATH REPORT.COMMENTS IMP SPEC: NORMAL
PATH REPORT.COMMENTS IMP SPEC: NORMAL
PATH REPORT.FINAL DX SPEC: NORMAL
PATH REPORT.GROSS SPEC: NORMAL
PATH REPORT.MICROSCOPIC SPEC OTHER STN: NORMAL
PATH REPORT.RELEVANT HX SPEC: NORMAL
PHOTO IMAGE: NORMAL

## 2022-02-22 PROCEDURE — 88305 TISSUE EXAM BY PATHOLOGIST: CPT | Mod: 26 | Performed by: PATHOLOGY

## 2022-02-24 NOTE — OP NOTE
Procedure Date: 02/18/2022    ATTENDING:  Fiona Lama MD    FIRST ASSISTANT:  Jamel Bains MD    PREOPERATIVE DIAGNOSIS:  Gender dysphoria.    POSTOPERATIVE DIAGNOSIS:  Gender dysphoria.    PROCEDURE PERFORMED:  Bilateral simple mastectomies with nipple graft reconstruction.  On-Q catheter placement.    ANESTHESIA:  GET.    ESTIMATED BLOOD LOSS:  25 mL    URINE OUTPUT:  Not measured.    INTRAVENOUS FLUIDS:  700 mL    COUNTS:  Correct.    COMPLICATIONS:  None.    DRAINS:  MARYLU x2.    SPECIMENS:  Right breast 361 grams, left breast 276 grams.    INDICATIONS FOR PROCEDURE:  This is a 41-year-old biological female who has a diagnosis of gender dysphoria.  They met WPATH and insurance criteria for gender-affirming top surgery.  They desired nipple graft reconstruction and would need a double incision approach due to volume and ptosis.    DESCRIPTION OF PROCEDURE:  The patient was seen in the preoperative waiting area.  The operative sites were marked.  This included sternal notch, sternal midline, inframammary folds with possible extension laterally, vertical line through the nipple areolar complex,  transverse line at the palpable inferior origin of the pectoralis major muscle, and mid humerus transposed onto the chest.  We also marked the medial and lateral borders of the breast footplate.  Informed consent was obtained after reviewing possible risks and complications, including but not limited to the following:  Infection, bleeding, hematoma/seroma formation, poor healing, possible spitting sutures, possible dehiscence, possible injury to surrounding neurovascular musculoskeletal structures as well as intrathoracic and intra-axillary structures, possible partial or complete failure of nipple grafts, possible residual asymmetries and deformities, possible need for further surgery, possible altered sensation of the chest wall, either hypo or hyper sensitivity, and anesthetic risks such as DVT, PE and cardiopulmonary  arrest.  The patient was then brought to the operating room and placed supine on the OR table.  Due to having Dr. Bains as an assistant, I felt that we did not need to place a Macias.  The patient was intubated with an LMA and the patient was positioned with arms at 90 degrees from the table that were secured to arm boards with Ace wraps.  The thighs and forelegs were supported with pillows and secured with padded safety straps.  A lower Fabrizio Hugger was in place.  The patient was put up into a sitting position and adjustments were made for symmetry.  We then prepped and draped the chest/breast area with ChloraPrep in the usual sterile fashion.     After a timeout was taken and the proper patient and procedure were identified, we made our inframammary fold incisions.  This was done with scalpel and Valleylab cautery on the right.  Approximately 2 cm of subcutaneous fat was left along the IMF border before beveling down to the pectoral fascia.  The breast mound was elevated off the pectoral fascia superiorly towards the clavicle, medially towards parasternal border and laterally past the lateral border of the pectoralis major muscle.  The breast mound was then displaced inferiorly and marked where it overlapped with the IMF incision.  This was a little bit below our preop machelle, but not far off, allowing for any further minor corrections.  The nipples were then harvested and kept on the back table, marked per side and wrapped in saline gauze.  The breast mound was then amputated at our level.  Of note, the patient had a very sloped, almost pectus carinatum anatomy; therefore, we kept our superior skin flaps a bit thicker laterally.  Any amputated breast tissue was then weighed off the back table before sending to the pathologist.  We did a little bit of thinning of the superior skin flap, but otherwise we had nice contour and symmetry.  Things were temporarily skin stapled and the patient was put into a sitting  position to make further adjustments of the incisional level and direction.  Some extension laterally was done, but not too much, and we did not have to join in the midline.  When we were happy with our resection, that was sent to the pathologist.  The dissection pocket was irrigated with saline antibiotic solution and hemostasis was achieved with cautery.  A dual On-Q catheter was percutaneously introduced from the epigastric region, draped along the superior pocket and secured with Tegaderm and benzoin.  #15 round MARYLU channel drains were introduced through a separate stab incision laterally and secured with 3-0 nylon suture.  This was draped along the inferior pocket.  Closure was then achieved with 3-0 Vicryl deep dermal buried sutures followed by 4-0 Vicryl running subcuticular suture.  A couple 2-0 Vicryls were used to pull some retracting subcutaneous tissue on the left side medially.  Once we were happy with our closure, we then used nipple templates to localize the best location for grafting.  This was de-epithelialized sharply with scalpel.  The nipples were then trimmed of any excess dermis, excess areola and nipple, which were quite large and thick.  This was then anchored to our recipient sites, low and lateral on the chest wall, but not too lateral given the tapered configuration.  These were secured with 5-0 fast absorbing gut interrupted and running cuticular suture and central nipple anchoring.  The nipples were then pie crusted with a 15 g. needle.  A bolster dressing consisting of Xeroform sheet with antibiotic-soaked cotton balls was held in place with skin staples and 2-0 silk tie-overs.  Exofin Dermabond was applied to the incisions.  JPs were trimmed and put to bulb suction.  ABD pads were used for drain sponges and a couple Kerlix rolls were unfurled across the anterior chest before wrapping circumferentially with a double long 6-inch Ace wrap for compression.     The patient was extubated,  transferred to a stretcher, and the On-Q catheters were attached to the reservoir containing 550 mL of 0.2% ropivacaine to be delivered at 2 mL per hour per catheter for the next 5 days. They were taken to the PACU in stable condition having tolerated the procedure without complications.    TOTAL TISSUE REMOVED:  361 grams on the right and 276 grams on the left.    Fiona Lama MD        D: 2022   T: 2022   MT: JOSE GUADALUPE/MAC    Name:     INDIA FRANCO  MRN:      [id number]        Account:        [id number]   :      1980           Procedure Date: 2022     Document: [id number]

## 2022-02-25 ENCOUNTER — OFFICE VISIT (OUTPATIENT)
Dept: PLASTIC SURGERY | Facility: CLINIC | Age: 42
End: 2022-02-25
Payer: COMMERCIAL

## 2022-02-25 DIAGNOSIS — F64.0 GENDER DYSPHORIA IN ADOLESCENT AND ADULT: Primary | ICD-10-CM

## 2022-02-25 PROCEDURE — 99207 PR NO CHARGE NURSE ONLY: CPT

## 2022-02-25 NOTE — PROGRESS NOTES
Pt. comes into clinic today at the request of Dr. Fiona Lama.    This service provided today was under the supervising provider of the day BARI Cabrera who was available if needed.    Reason for visit: Post op visit.  Pt returns one week after bilateral mastectomy with free nipple grafts.    Nipple grafts:  Nipple bolsters removed-good adherence to skin: Color, brown  Incisions:  Well approximated, no drainage, no redness  Pain:  Denies- using Tylenol occasionally  Drains: Bilateral MARYLU drains < 20 ml for several days.  Both removed.  Instructions:  See AVS  Return to clinic:  See Dr. Lama in 5 weeks    Bryan Valle, RN, BSN  Care Coordinator

## 2022-02-25 NOTE — PATIENT INSTRUCTIONS
Care of Nipples and Chest Incisions    Change nipple dressings daily.  Take dressings off before or after showering.  No direct shower spray on nipple grafts for 4 weeks from your surgery date.     Cut vaseline gauze to nipple size and place over nipple.  Place folded white gauze over vaseline gauze and cover with plastic dressing.  Do dressing changes for 1 more week from today.  If you continue to have drainage, continue the dressings until drainage stops.     Keep compression on for 1 more week from today. No lifting greater than 5 lbs for 4 weeks from your surgery date. Begin moisturizing your incisions with any non-scented, non-glittered lotion 3 weeks from your surgery date. Once this becomes loose, you may peel off the incisional tape. Then apply silicone gel sheets to incisions.  These can be purchased on [employer] and at [employer] and [employer].     At one month post op, baseline shoulder motion should return. You may start to exercise lightly at this time, gradually moving up to arm movement. Full shoulder motion should return by 8 weeks.      When to call:  Sudden increase of swelling or pain on one side  Uncontrolled pain despite pain medication  Worsening of chest swelling   Separation of nipple from chest skin  Redness and warmth in chest area  Fever > 101     Contact the RN between 8-3:30 Mon-Fri with questions or concerns through my chart message via your doctor's name (most efficient) or call at [phone].   For urgent medical issues that cannot wait, call [phone] Mon-Fri 8:-4:30.     After hours, weekends or holidays, call [phone] and ask to speak to the on call plastic surgeon fellow.

## 2022-02-25 NOTE — LETTER
2/25/2022       RE: Luis Gutierrez  [address]  [facility] [address]     Dear Colleague,    Thank you for referring your patient, Luis Gutierrez, to the [facility] PLASTIC [facility] REC[facility] SURGERY [facility]INIC [place] at [facility]. Please see a copy of my visit note below.    Pt. comes into clinic today at the request of Dr. Fiona Lama.    This service provided today was under the supervising provider of the day BARI Cabrera who was available if needed.    Reason for visit: Post op visit.  Pt returns one week after bilateral mastectomy with free nipple grafts.    Nipple grafts:  Nipple bolsters removed-good adherence to skin: Color, brown  Incisions:  Well approximated, no drainage, no redness  Pain:  Denies- using Tylenol occasionally  Drains: Bilateral MAYRLU drains < 20 ml for several days.  Both removed.  Instructions:  See AVS  Return to clinic:  See Dr. Lama in 5 weeks    Bryan Valle, RN, BSN  Care Coordinator

## 2022-03-28 DIAGNOSIS — F64.0 GENDER DYSPHORIA IN ADOLESCENT AND ADULT: ICD-10-CM

## 2022-03-28 DIAGNOSIS — F64.0 GENDER DYSPHORIA IN ADOLESCENT AND ADULT: Primary | ICD-10-CM

## 2022-03-28 RX ORDER — DOXYCYCLINE 100 MG/1
100 CAPSULE ORAL 2 TIMES DAILY
Qty: 10 CAPSULE | Refills: 0 | Status: SHIPPED | OUTPATIENT
Start: 2022-03-28 | End: 2022-03-28

## 2022-03-28 RX ORDER — DOXYCYCLINE 100 MG/1
100 CAPSULE ORAL 2 TIMES DAILY
Qty: 10 CAPSULE | Refills: 0 | Status: SHIPPED | OUTPATIENT
Start: 2022-03-28 | End: 2022-08-06

## 2022-04-05 ENCOUNTER — OFFICE VISIT (OUTPATIENT)
Dept: PLASTIC SURGERY | Facility: CLINIC | Age: 42
End: 2022-04-05
Payer: COMMERCIAL

## 2022-04-05 VITALS
BODY MASS INDEX: 30.3 KG/M2 | OXYGEN SATURATION: 97 % | HEIGHT: 63 IN | SYSTOLIC BLOOD PRESSURE: 127 MMHG | DIASTOLIC BLOOD PRESSURE: 78 MMHG | WEIGHT: 171 LBS | HEART RATE: 67 BPM

## 2022-04-05 DIAGNOSIS — Z90.13 S/P BILATERAL MASTECTOMY: Primary | ICD-10-CM

## 2022-04-05 PROCEDURE — 99024 POSTOP FOLLOW-UP VISIT: CPT | Performed by: SURGERY

## 2022-04-05 ASSESSMENT — PAIN SCALES - GENERAL: PAINLEVEL: NO PAIN (0)

## 2022-04-05 NOTE — NURSING NOTE
"Chief Complaint   Patient presents with     GLORIA Smith, is being seen today for a 6 week post op DOS 2/18/22, no concerns at this time, as reported by patient.       Vitals:    04/05/22 0838   BP: 127/78   BP Location: Left arm   Patient Position: Chair   Cuff Size: Adult Regular   Pulse: 67   SpO2: 97%   Weight: 77.6 kg (171 lb)   Height: 1.6 m (5' 3\")       Body mass index is 30.29 kg/m .      Ernestine Pool LPN    "

## 2022-04-05 NOTE — PROGRESS NOTES
PLASTICS POST-OP  This is a 41 year old nonbinary person with a history of gender dysphoria who presents 6 weeks post-op after a bilateral simple mastectomy with nipple graft reconstruction on 02/18/2022. They are here today with their child Mauricio.They state not currently having any complaints. They report they were given enough narcotics, and they were unsure if the OnQ helped. Motrin would have helped better than Tylenol as alternative.They report having had an infection following the application of silicone tape that is now resolved. For scars they have been massaging in lotion every day. They do not report having zingers, and they state sensation is coming back. ROM is 99% with lateral tightness. That have been mildly active. They are happy with how they look and they walk around without a shirt on at home. They state that they will return to work in 2 weeks.     PE: Chest: Nice upper chest contour.   Good symmetry.   Incisions do not touch at midline. Scars very thin.  Tiny dog ear R medial incision.  NGs slightly hypopigmented centrally. R NG slightly smaller.   Resolved infection L incision linette-lateral.   Photos taken with verbal consent.     A&P: 41 year old nonbinary person who presents 6 weeks post-op after a bilateral simple mastectomy with nipple graft conservation on 02/18/2022.     Overall Luis is healing well. They can gradually increase activity as tolerated over next couple weeks. RTW in a couple weeks as adame.     We discussed additional scar reducing techniques, such as Mederma, silicone strips, vitamin E oil, emu oil, massage if they are interested.  They will follow up 6 months post-op. Causes for returning sooner were discussed. If interested in excising small medial small dogear they will let us know.     Total time = 10 minutes, spent on the date of encounter doing chart review, history and physical, dressing changes, documentation, patient education, and any further activity as  noted above.     This note was prepared on behalf of Fiona Lama MD by Kelly Salas, a trained medical scribe, based on my observations and the provider's statements to me.

## 2022-04-05 NOTE — LETTER
4/5/2022       RE: Luis Gutierrez  [address]  [place] [facility] [address]     Dear Colleague,    Thank you for referring your patient, Luis Gutierrez, to the [facility] PLASTIC AND RECONSTRU[facility]IVE SURGERY CLINIC [place] at [facility]. Please see a copy of my visit note below.      PLASTICS POST-OP  This is a 41 year old nonbinary person with a history of gender dysphoria who presents 6 weeks post-op after a bilateral simple mastectomy with nipple graft reconstruction on 02/18/2022. They are here today with their child Mauricio.They state not currently having any complaints. They report they were given enough narcotics, and they were unsure if the OnQ helped. Motrin would have helped better than Tylenol as alternative.They report having had an infection following the application of silicone tape that is now resolved. For scars they have been massaging in lotion every day. They do not report having zingers, and they state sensation is coming back. ROM is 99% with lateral tightness. That have been mildly active. They are happy with how they look and they walk around without a shirt on at home. They state that they will return to work in 2 weeks.     PE: Chest: Nice upper chest contour.   Good symmetry.   Incisions do not touch at midline. Scars very thin.  Tiny dog ear R medial incision.  NGs slightly hypopigmented centrally. R NG slightly smaller.   Resolved infection L incision linette-lateral.   Photos taken with verbal consent.     A&P: 41 year old nonbinary person who presents 6 weeks post-op after a bilateral simple mastectomy with nipple graft conservation on 02/18/2022.     Overall Luis is healing well. They can gradually increase activity as tolerated over next couple weeks. RTW in a couple weeks as adame.     We discussed additional scar reducing techniques, such as Mederma, silicone strips, vitamin E oil, emu oil, massage if they are interested.  They  will follow up 6 months post-op. Causes for returning sooner were discussed. If interested in excising small medial small dogear they will let us know.     Total time = 10 minutes, spent on the date of encounter doing chart review, history and physical, dressing changes, documentation, patient education, and any further activity as noted above.     This note was prepared on behalf of Fiona Lama MD by Kelly Salas, a trained medical scribe, based on my observations and the provider's statements to me.         Fiona Lama MD

## 2022-05-05 DIAGNOSIS — F64.0 GENDER DYSPHORIA IN ADOLESCENT AND ADULT: Primary | ICD-10-CM

## 2022-05-23 ENCOUNTER — ANCILLARY PROCEDURE (OUTPATIENT)
Dept: ULTRASOUND IMAGING | Facility: CLINIC | Age: 42
End: 2022-05-23
Attending: REGISTERED NURSE
Payer: COMMERCIAL

## 2022-05-23 ENCOUNTER — OFFICE VISIT (OUTPATIENT)
Dept: FAMILY MEDICINE | Facility: CLINIC | Age: 42
End: 2022-05-23
Payer: COMMERCIAL

## 2022-05-23 VITALS
TEMPERATURE: 97.7 F | WEIGHT: 171.8 LBS | OXYGEN SATURATION: 99 % | HEIGHT: 62 IN | HEART RATE: 82 BPM | RESPIRATION RATE: 16 BRPM | BODY MASS INDEX: 31.62 KG/M2

## 2022-05-23 DIAGNOSIS — Z12.4 SCREENING FOR CERVICAL CANCER: Primary | ICD-10-CM

## 2022-05-23 DIAGNOSIS — F64.0 GENDER DYSPHORIA IN ADOLESCENT AND ADULT: ICD-10-CM

## 2022-05-23 DIAGNOSIS — Z11.3 ROUTINE SCREENING FOR STI (SEXUALLY TRANSMITTED INFECTION): ICD-10-CM

## 2022-05-23 PROCEDURE — 87591 N.GONORRHOEAE DNA AMP PROB: CPT | Performed by: STUDENT IN AN ORGANIZED HEALTH CARE EDUCATION/TRAINING PROGRAM

## 2022-05-23 PROCEDURE — 99213 OFFICE O/P EST LOW 20 MIN: CPT | Mod: GC | Performed by: STUDENT IN AN ORGANIZED HEALTH CARE EDUCATION/TRAINING PROGRAM

## 2022-05-23 PROCEDURE — 76830 TRANSVAGINAL US NON-OB: CPT

## 2022-05-23 PROCEDURE — 87624 HPV HI-RISK TYP POOLED RSLT: CPT | Performed by: STUDENT IN AN ORGANIZED HEALTH CARE EDUCATION/TRAINING PROGRAM

## 2022-05-23 PROCEDURE — 76830 TRANSVAGINAL US NON-OB: CPT | Mod: 26 | Performed by: OBSTETRICS & GYNECOLOGY

## 2022-05-23 PROCEDURE — G0145 SCR C/V CYTO,THINLAYER,RESCR: HCPCS | Performed by: STUDENT IN AN ORGANIZED HEALTH CARE EDUCATION/TRAINING PROGRAM

## 2022-05-23 PROCEDURE — 87491 CHLMYD TRACH DNA AMP PROBE: CPT | Performed by: STUDENT IN AN ORGANIZED HEALTH CARE EDUCATION/TRAINING PROGRAM

## 2022-05-23 NOTE — PROGRESS NOTES
Preceptor Attestation:   Patient seen, evaluated and discussed with the resident. I have verified the content of the note, which accurately reflects my assessment of the patient and the plan of care.   Supervising Physician:  Dilip Truong MD

## 2022-05-23 NOTE — PROGRESS NOTES
"  Assessment & Plan     Screening for cervical cancer  Patient presents for routine screening for cervical cancer. Last pap smear 2019 per Chart Review at [facility]. (Unclear what results were at that time.) Discussed risk that test may be non-diagnostic as patient is on testosterone for gender affirming care.   - Pap screen with HPV - recommended age 30 - 65 years  - HPV Hold (Lab Only)  Addendum: Pap resulted. Sample sufficient. NIL.     Routine screening for STI (sexually transmitted infection)  Did not go into sexual history. Offered STI testing, which patient was interested in. Patient is asymptomatic.   - Tested for gonorrhea/chlamydia. Both negative.     Receiving gender affirming care  Satisfied with current dose of testosterone. Still having periods, but they're lighter than they were previously. Plans to follow-up with Dr. Ha.       Marjorie Zambrano MD  [facility] DEBBY Smith is a 41 year old who presents for the following health issues     Requests pap smear. Hoping to get hysterectomy eventually.   H/o painful periods, better in last several months.   Satisfied with current gender expression.   Plans on following up w/ Dr. GUO     Objective    Pulse 82   Temp 97.7  F (36.5  C) (Oral)   Resp 16   Ht 1.58 m (5' 2.21\")   Wt 77.9 kg (171 lb 12.8 oz)   SpO2 99%   BMI 31.22 kg/m    Body mass index is 31.22 kg/m .  Physical Exam   Well appearing  Vulva appears normal without any lesions, loss of architecture, or other abnormalities.   Cervix with nabothian cysts, but otherwise normal.     Results for orders placed or performed in visit on 05/23/22   Pap screen with HPV - recommended age 30 - 65 years     Status: None   Result Value Ref Range    Interpretation        Negative for Intraepithelial Lesion or Malignancy (NILM)    Comment         Papanicolaou Test Limitations:  Cervical cytology is a screening test with limited sensitivity, and regular screening is " critical for cancer prevention.  Pap tests are primarily effective for the diagnosis/prevention of squamous cell carcinoma, not adenocarcinoma or other cancers.        Specimen Adequacy       Satisfactory for evaluation, endocervical/transformation zone component present    Clinical Information       none[on Testosterone      Reflex Testing Yes regardless of result     Previous Abnormal?       No      Performing Labs       The technical component of this testing was completed at [facility]     Chlamydia trachomatis/Neisseria gonorrhoeae by PCR - Clinic Collect     Status: Normal    Specimen: Cervix; Swab   Result Value Ref Range    Chlamydia Trachomatis Negative Negative    Neisseria gonorrhoeae Negative Negative   Results for orders placed or performed in visit on 05/23/22   US Transvaginal Pelvic Non-OB     Status: None    Narrative    Pelvic U/S - Transvaginal  [facility]   [facility]  Sonographer:  Bess Awad RDMS  Indication: Bleeding/Menses- Menorrhagia (heavy menses)  LMP: 04/25/2022  History: dysmenorrhea, menorrhagia, planning hysterectomy  Gynecological Ultrasonography:   Uterus: anteverted.  Posterior myoma - 1.73 x 1.52 x 1.50cm.  Size: 8.2 x 5.2 x 4.7 cm  Endometrium: Thickness Total 7.0 mm  Findings: 0.84 x 0.75 x 0.68cm echogenic structure in cavity  Right Ovary: 3.1 x 4.3 x 1.7 cm. Wnl  Left Ovary: 2.54 x 1.74 x 1.79 cm. Wnl  Cul de Sac Free Fluid: Trace     Impression: Uterus with one small fibroid, possible endometrial polyp.    Further studies as clinically indicated.     Mikaela Roque MD, FACOG            ----- Service Performed and Documented by Resident or Fellow ------

## 2022-05-24 LAB
C TRACH DNA SPEC QL PROBE+SIG AMP: NEGATIVE
N GONORRHOEA DNA SPEC QL NAA+PROBE: NEGATIVE

## 2022-05-26 LAB
BKR LAB AP GYN ADEQUACY: NORMAL
BKR LAB AP GYN INTERPRETATION: NORMAL
BKR LAB AP HPV REFLEX: NORMAL
BKR LAB AP PREVIOUS ABNORMAL: NORMAL
PATH REPORT.COMMENTS IMP SPEC: NORMAL
PATH REPORT.COMMENTS IMP SPEC: NORMAL
PATH REPORT.RELEVANT HX SPEC: NORMAL

## 2022-05-30 LAB
HUMAN PAPILLOMA VIRUS 16 DNA: NEGATIVE
HUMAN PAPILLOMA VIRUS 18 DNA: NEGATIVE
HUMAN PAPILLOMA VIRUS FINAL DIAGNOSIS: NORMAL
HUMAN PAPILLOMA VIRUS OTHER HR: NEGATIVE

## 2022-07-06 DIAGNOSIS — F64.9 GENDER DYSPHORIA: ICD-10-CM

## 2022-07-06 RX ORDER — TESTOSTERONE CYPIONATE 200 MG/ML
90 INJECTION, SOLUTION INTRAMUSCULAR WEEKLY
Qty: 4 ML | Refills: 3 | Status: CANCELLED | OUTPATIENT
Start: 2022-07-06

## 2022-07-06 NOTE — TELEPHONE ENCOUNTER
"Request for medication refill:  testosterone cypionate (DEPOTESTOSTERONE) 200 MG/ML injection  Providers if patient needs an appointment and you are willing to give a one month supply please refill for one month and  send a letter/MyChart using \".SMILLIMITEDREFILL\" .smillimited and route chart to \"P SMI \" (Giving one month refill in non controlled medications is strongly recommended before denial)    If refill has been denied, meaning absolutely no refills without visit, please complete the smart phrase \".smirxrefuse\" and route it to the \"P SMI MED REFILLS\"  pool to inform the patient and the pharmacy.    Jasper Haley MA        "

## 2022-07-07 ENCOUNTER — OFFICE VISIT (OUTPATIENT)
Dept: OBGYN | Facility: CLINIC | Age: 42
End: 2022-07-07
Attending: OBSTETRICS & GYNECOLOGY
Payer: COMMERCIAL

## 2022-07-07 VITALS
HEART RATE: 76 BPM | WEIGHT: 162.8 LBS | SYSTOLIC BLOOD PRESSURE: 124 MMHG | DIASTOLIC BLOOD PRESSURE: 74 MMHG | BODY MASS INDEX: 28.84 KG/M2 | HEIGHT: 63 IN

## 2022-07-07 DIAGNOSIS — N92.0 MENORRHAGIA WITH REGULAR CYCLE: ICD-10-CM

## 2022-07-07 DIAGNOSIS — F64.9 GENDER DYSPHORIA: Primary | ICD-10-CM

## 2022-07-07 PROCEDURE — G0463 HOSPITAL OUTPT CLINIC VISIT: HCPCS

## 2022-07-07 PROCEDURE — 99214 OFFICE O/P EST MOD 30 MIN: CPT | Performed by: OBSTETRICS & GYNECOLOGY

## 2022-07-07 NOTE — LETTER
"2022       RE: Luis Gutierrez  [address]  [facility] [address]     Dear Colleague,    Thank you for referring your patient, Luis Gutierrez, to the [facility] [place] at [facility]. Please see a copy of my visit note below.    Luis is a 41 year old adult  that presents today to discuss gender affirming hysterectomy.    HPI:  Patient was last seen in our clinic on 2021. They were in the process of deciding between hysterectomy and surgical sterilization. They have decided that they would prefer hysterectomy. They have already completed top surgery and has been utilizing testosterone. They have been utilizing testosterone for more than 2 years now. Plans to continue testosterone as long as possible. They are interested in a supracervical hysterectomy with removal of fallopian tubes and ovaries. They have always experienced significant distress associated with menstrual bleeding being heavy and painful. They would like to keep cervix due to concerns with possible anatomical changes and sexual practices preferences.     They also completed a pelvic ultrasound to prepare for surgery and this was completed on 2022 with findings: \"Uterus: anteverted.  Posterior myoma - 1.73 x 1.52 x 1.50cm.  Size: 8.2 x 5.2 x 4.7 cm  Endometrium: Thickness Total 7.0 mm  Findings: 0.84 x 0.75 x 0.68cm echogenic structure in cavity  Right Ovary: 3.1 x 4.3 x 1.7 cm. Wnl  Left Ovary: 2.54 x 1.74 x 1.79 cm. Wnl  Cul de Sac Free Fluid: Trace\"    They also completed a pap smear on 2022 and was found NILM with negative HPV.     ROS:  General: none  Head/Eyes: none  Ears/Nose/Throat: none  Cardiovascular: none  Respiratory: none  Gastrointestinal: none  Breast: none  Genitourinary: abnormal bleeding  Sexual Function: none  Musculoskeletal: none  Skin: none  Neurological: none  Mental Health: none  Endocrine: none    PROBLEM LIST:  Patient Active Problem List "   Diagnosis     Preeclampsia     History of hemolysis, elevated liver enzymes, and low platelet (HELLP) syndrome     S/P  section     Nicotine dependence, uncomplicated, unspecified nicotine product type     Depression     Receiving gender affirming care     Family history of malignant neoplasm of breast     Dyslipidemia       OB/GYN HISTORY:   OB History    Para Term  AB Living   1 0 0 0 0 1   SAB IAB Ectopic Multiple Live Births   0 0 0 0 0      # Outcome Date GA Lbr Lonnie/2nd Weight Sex Delivery Anes PTL Lv   1                Birth Comments: System Generated. Please review and update pregnancy details.       PAST MEDICAL HISTORY:  Past Medical History:   Diagnosis Date     Hypertension 5/10/12    Preeclampsia       Life Style Modifiers:   Tobacco:  reports that Luis Gutierrez quit smoking about 19 months ago. Luis Gutierrez's smoking use included cigarettes. Luis Gutierrez started smoking about 21 years ago. Luis Gutierrez has a 3.00 pack-year smoking history. Luis Gutierrez has never used smokeless tobacco.   Alcohol:  reports current alcohol use.   Drug use:  reports current drug use. Drug: Marijuana.        PAST SURGICAL HISTORY:  Past Surgical History:   Procedure Laterality Date      SECTION  2012     MASTECTOMY SIMPLE BILATERAL Bilateral 2022    Procedure: MASTECTOMY, BILATERAL, SIMPLE, WITH NIPPLE GRAFTS, ONQ;  Surgeon: Fiona Lama MD;  [place] [facility] [place]       FAMILY HISTORY:  Family History   Problem Relation Age of Onset     Hypertension Father      Substance Abuse Father      Cancer Maternal Grandfather 50        Bladder cancer. Smoker.     Heart Disease Maternal Grandfather      Asthma Daughter      Breast Cancer Mother      Glaucoma No family hx of      Macular Degeneration No family hx of        SOCIAL HISTORY:  Social History     Socioeconomic History     Marital status: Single     Spouse name: Not on file     Number of children: Not on file     Years  "of education: Not on file     Highest education level: Not on file   Occupational History     Not on file   Tobacco Use     Smoking status: Former Smoker     Packs/day: 0.15     Years: 20.00     Pack years: 3.00     Types: Cigarettes     Start date: 2000     Quit date: 2020     Years since quittin.6     Smokeless tobacco: Never Used   Vaping Use     Vaping Use: Never used   Substance and Sexual Activity     Alcohol use: Yes     Drug use: Yes     Types: Marijuana     Sexual activity: Yes     Birth control/protection: None   Other Topics Concern     Not on file   Social History Narrative     Not on file     Social Determinants of Health     Financial Resource Strain: Not on file   Food Insecurity: Not on file   Transportation Needs: Not on file   Physical Activity: Not on file   Stress: Not on file   Social Connections: Not on file   Intimate Partner Violence: Not on file   Housing Stability: Not on file       MEDICATIONS:  Current Outpatient Medications   Medication Sig Dispense Refill     azithromycin (ZITHROMAX Z-LAURENT) 250 MG tablet Take 1 tablet (250 mg) by mouth daily Take 2 tabs 1st day 6 tablet 0     doxycycline hyclate (VIBRAMYCIN) 100 MG capsule Take 1 capsule (100 mg) by mouth 2 times daily 10 capsule 0     hydrOXYzine (ATARAX) 25 MG tablet Take 1 tablet (25 mg) by mouth 3 times daily as needed for itching 20 tablet 1     needle, disp, 18G X 1\" MISC Use to draw up hormones once weekly 25 each 1     Needle, Disp, 25G X 5/8\" MISC 1 each once a week 50 each 1     ondansetron (ZOFRAN ODT) 4 MG ODT tab Take 1 tablet (4 mg) by mouth every 8 hours as needed for nausea 20 tablet 1     syringe, disposable, (B-D SYRINGE LUER-COURTNEY) 1 ML MISC Use to draw hormones weekly 25 each 1     testosterone cypionate (DEPOTESTOSTERONE) 200 MG/ML injection Inject 0.3 mLs (60 mg) Subcutaneous once a week 4 mL 3       ALLERGIES:  Patient has no known allergies.    VITALS:  Blood pressure 124/74, pulse 76, height 1.6 m (5' " "3\"), weight 73.8 kg (162 lb 12.8 oz), last menstrual period 2022, not currently breastfeeding.    PHYSICAL EXAM:  Deferred  A/P: Discussed with patient ultrasound results. Re discussed hysterectomy routes. Patient would prefer to keep cervix. Discussed that this would be a supracervical hysterectomy, we can still complete it laparoscopically but will need a larger incision to remove uterus. Patient understands that if we are unable to safely do this laparoscopically then there is always a risk to need to open the abdomen as in a  section. Patient is okay with this. Discussed the need to continue pap smear screenings if we leave cervix in place. Discussed that sometimes it is inevitable that some piece of endometrium is left with cervix and patients can still experience \"menstrual like bleeding\". Discussed removal of ovaries. Patient plans to continue testosterone as long as possible. Discussed that we do not have enough evidence so far of long term effects of testosterone therapy, but we do know that as long as they keep utilizing testosterone in a male range then we can maintain adequate bone health. Discussed risks of surgery such as bleeding, infection, damage to nearby organs, blood clots. Discussed interventions that we do to decrease risks. Discussed time off work. If surgery is completed laparoscopically then they could go home same day. If not, usually at least one night stay in the hospital. Discussed post op pain management options. Discussed restrictions after surgery including lifting more than 15 pounds for 4-6 weeks, same thing with nothing vaginally for the same amount of time. Patient is in agreement with plan, they would like to schedule surgery, will send surgery request form.    Chapis Chirinos MD      "

## 2022-07-08 NOTE — PROGRESS NOTES
"Luis is a 41 year old adult  that presents today to discuss gender affirming hysterectomy.    HPI:  Patient was last seen in our clinic on 2021. They were in the process of deciding between hysterectomy and surgical sterilization. They have decided that they would prefer hysterectomy. They have already completed top surgery and has been utilizing testosterone. They have been utilizing testosterone for more than 2 years now. Plans to continue testosterone as long as possible. They are interested in a supracervical hysterectomy with removal of fallopian tubes and ovaries. They have always experienced significant distress associated with menstrual bleeding being heavy and painful. They would like to keep cervix due to concerns with possible anatomical changes and sexual practices preferences.     They also completed a pelvic ultrasound to prepare for surgery and this was completed on 2022 with findings: \"Uterus: anteverted.  Posterior myoma - 1.73 x 1.52 x 1.50cm.  Size: 8.2 x 5.2 x 4.7 cm  Endometrium: Thickness Total 7.0 mm  Findings: 0.84 x 0.75 x 0.68cm echogenic structure in cavity  Right Ovary: 3.1 x 4.3 x 1.7 cm. Wnl  Left Ovary: 2.54 x 1.74 x 1.79 cm. Wnl  Cul de Sac Free Fluid: Trace\"    They also completed a pap smear on 2022 and was found NILM with negative HPV.     ROS:  General: none  Head/Eyes: none  Ears/Nose/Throat: none  Cardiovascular: none  Respiratory: none  Gastrointestinal: none  Breast: none  Genitourinary: abnormal bleeding  Sexual Function: none  Musculoskeletal: none  Skin: none  Neurological: none  Mental Health: none  Endocrine: none    PROBLEM LIST:  Patient Active Problem List   Diagnosis     Preeclampsia     History of hemolysis, elevated liver enzymes, and low platelet (HELLP) syndrome     S/P  section     Nicotine dependence, uncomplicated, unspecified nicotine product type     Depression     Receiving gender affirming care     Family history of malignant neoplasm of " breast     Dyslipidemia       OB/GYN HISTORY:   OB History    Para Term  AB Living   1 0 0 0 0 1   SAB IAB Ectopic Multiple Live Births   0 0 0 0 0      # Outcome Date GA Lbr Lonnie/2nd Weight Sex Delivery Anes PTL Lv   1                Birth Comments: System Generated. Please review and update pregnancy details.       PAST MEDICAL HISTORY:  Past Medical History:   Diagnosis Date     Hypertension 5/10/12    Preeclampsia       Life Style Modifiers:   Tobacco:  reports that Luis Gutierrez quit smoking about 19 months ago. Luis Gutierrez's smoking use included cigarettes. Luis Gutierrez started smoking about 21 years ago. Luis Gutierrez has a 3.00 pack-year smoking history. Luis Gutierrez has never used smokeless tobacco.   Alcohol:  reports current alcohol use.   Drug use:  reports current drug use. Drug: Marijuana.        PAST SURGICAL HISTORY:  Past Surgical History:   Procedure Laterality Date      SECTION  2012     MASTECTOMY SIMPLE BILATERAL Bilateral 2022    Procedure: MASTECTOMY, BILATERAL, SIMPLE, WITH NIPPLE GRAFTS, ONQ;  Surgeon: Fiona Lama MD;  Location: [facility] OR       FAMILY HISTORY:  Family History   Problem Relation Age of Onset     Hypertension Father      Substance Abuse Father      Cancer Maternal Grandfather 50        Bladder cancer. Smoker.     Heart Disease Maternal Grandfather      Asthma Daughter      Breast Cancer Mother      Glaucoma No family hx of      Macular Degeneration No family hx of        SOCIAL HISTORY:  Social History     Socioeconomic History     Marital status: Single     Spouse name: Not on file     Number of children: Not on file     Years of education: Not on file     Highest education level: Not on file   Occupational History     Not on file   Tobacco Use     Smoking status: Former Smoker     Packs/day: 0.15     Years: 20.00     Pack years: 3.00     Types: Cigarettes     Start date: 2000     Quit date: 2020     Years since  "quittin.6     Smokeless tobacco: Never Used   Vaping Use     Vaping Use: Never used   Substance and Sexual Activity     Alcohol use: Yes     Drug use: Yes     Types: Marijuana     Sexual activity: Yes     Birth control/protection: None   Other Topics Concern     Not on file   Social History Narrative     Not on file     Social Determinants of Health     Financial Resource Strain: Not on file   Food Insecurity: Not on file   Transportation Needs: Not on file   Physical Activity: Not on file   Stress: Not on file   Social Connections: Not on file   Intimate Partner Violence: Not on file   Housing Stability: Not on file       MEDICATIONS:  Current Outpatient Medications   Medication Sig Dispense Refill     azithromycin (ZITHROMAX Z-LAURENT) 250 MG tablet Take 1 tablet (250 mg) by mouth daily Take 2 tabs 1st day 6 tablet 0     doxycycline hyclate (VIBRAMYCIN) 100 MG capsule Take 1 capsule (100 mg) by mouth 2 times daily 10 capsule 0     hydrOXYzine (ATARAX) 25 MG tablet Take 1 tablet (25 mg) by mouth 3 times daily as needed for itching 20 tablet 1     needle, disp, 18G X 1\" MISC Use to draw up hormones once weekly 25 each 1     Needle, Disp, 25G X 5/8\" MISC 1 each once a week 50 each 1     ondansetron (ZOFRAN ODT) 4 MG ODT tab Take 1 tablet (4 mg) by mouth every 8 hours as needed for nausea 20 tablet 1     syringe, disposable, (B-D SYRINGE LUER-COURTNEY) 1 ML MISC Use to draw hormones weekly 25 each 1     testosterone cypionate (DEPOTESTOSTERONE) 200 MG/ML injection Inject 0.3 mLs (60 mg) Subcutaneous once a week 4 mL 3       ALLERGIES:  Patient has no known allergies.    VITALS:  Blood pressure 124/74, pulse 76, height 1.6 m (5' 3\"), weight 73.8 kg (162 lb 12.8 oz), last menstrual period 2022, not currently breastfeeding.    PHYSICAL EXAM:  Deferred  A/P: Discussed with patient ultrasound results. Re discussed hysterectomy routes. Patient would prefer to keep cervix. Discussed that this would be a supracervical " "hysterectomy, we can still complete it laparoscopically but will need a larger incision to remove uterus. Patient understands that if we are unable to safely do this laparoscopically then there is always a risk to need to open the abdomen as in a  section. Patient is okay with this. Discussed the need to continue pap smear screenings if we leave cervix in place. Discussed that sometimes it is inevitable that some piece of endometrium is left with cervix and patients can still experience \"menstrual like bleeding\". Discussed removal of ovaries. Patient plans to continue testosterone as long as possible. Discussed that we do not have enough evidence so far of long term effects of testosterone therapy, but we do know that as long as they keep utilizing testosterone in a male range then we can maintain adequate bone health. Discussed risks of surgery such as bleeding, infection, damage to nearby organs, blood clots. Discussed interventions that we do to decrease risks. Discussed time off work. If surgery is completed laparoscopically then they could go home same day. If not, usually at least one night stay in the hospital. Discussed post op pain management options. Discussed restrictions after surgery including lifting more than 15 pounds for 4-6 weeks, same thing with nothing vaginally for the same amount of time. Patient is in agreement with plan, they would like to schedule surgery, will send surgery request form.    Chapis Chirinos MD                "

## 2022-07-19 ENCOUNTER — TELEPHONE (OUTPATIENT)
Dept: OBGYN | Facility: CLINIC | Age: 42
End: 2022-07-19

## 2022-07-19 NOTE — TELEPHONE ENCOUNTER
Left message for patient to call back and schedule surgery.    Alisia Cade  Clinical Services Assistant

## 2022-08-06 ENCOUNTER — VIRTUAL VISIT (OUTPATIENT)
Dept: URGENT CARE | Facility: CLINIC | Age: 42
End: 2022-08-06
Payer: COMMERCIAL

## 2022-08-06 DIAGNOSIS — U07.1 CLINICAL DIAGNOSIS OF COVID-19: Primary | ICD-10-CM

## 2022-08-06 PROCEDURE — 99213 OFFICE O/P EST LOW 20 MIN: CPT | Mod: GT

## 2022-08-06 NOTE — PATIENT INSTRUCTIONS

## 2022-08-06 NOTE — PROGRESS NOTES
"MGENERAL: Healthy, alert and no distress  EYES: Eyes grossly normal to inspection.  No discharge or erythema, or obvious scleral/conjunctival abnormalities.  HENT: Normal cephalic/atraumatic.  External ears, nose and mouth without ulcers or lesions.  No nasal drainage visible.  NECK: No asymmetry, visible masses or scars  RESP: No audible wheeze, cough, or visible cyanosis.  No visible retractions or increased work of breathing.    SKIN: Visible skin clear. No significant rash, abnormal pigmentation or lesions.  NEURO: Cranial nerves grossly intact.  Mentation and speech appropriate for age.  PSYCH: Mentation appears normal, affect normal/bright, judgement and insight intact, normal speech and appearance well-groomed.alicia is a 42 year old who is being evaluated via a billable video visit.        Assessment & Plan     Clinical diagnosis of COVID-19    Treat with Paxlovid.    - nirmatrelvir and ritonavir (PAXLOVID) therapy pack; Take 3 tablets by mouth 2 times daily for 5 days (Take 2 Nirmatrelvir tablets and 1 Ritonavir tablet twice daily for 5 days)               COVID-19 positive patient.  Encounter for consideration of medication intervention. Patient does qualify for a prescription. Full discussion with patient including medication options, risks and benefits. Potential drug interactions reviewed with patient.     Treatment Planned Paxlovid, Rx sent to [place] pharmacy    Temporary change to home medications:  None     Estimated body mass index is 28.84 kg/m  as calculated from the following:    Height as of 7/7/22: 1.6 m (5' 3\").    Weight as of 7/7/22: 73.8 kg (162 lb 12.8 oz).  GFR Estimate   Date Value Ref Range Status   10/11/2021 69 >60 mL/min/1.73m2 Final     Comment:     GFR not calculated when sex unspecified or nonbinary.  As of July 11, 2021, eGFR is calculated by the CKD-EPI creatinine equation, without race adjustment. eGFR can be influenced by muscle mass, exercise, and diet. The reported eGFR is " an estimation only and is only applicable if the renal function is stable.   06/07/2021 79 >60 mL/min/[1.73_m2] Final     Comment:     Non  GFR Calc  Starting 12/18/2018, serum creatinine based estimated GFR (eGFR) will be   calculated using the Chronic Kidney Disease Epidemiology Collaboration   (CKD-EPI) equation.       Lab Results   Component Value Date    [id number] Negative 02/15/2022       No follow-ups on file.    Virtual Urgent Care  [facility] VIRTUAL URGENT CARE    Emmanuel Smith is a 42 year old, presenting for the following health issues:  No chief complaint on file.      HPI       COVID-19 Symptom Review  How many days ago did these symptoms start? 8/4    Are any of the following symptoms significant for you?  New or worsening difficulty breathing? Yes    Please describe what kind of difficulty you are having breathing:Mild dyspnea (able to do ADLs without difficulty, mild shortness of breath with activities such as climbing one or two flights of stairs or walking briskly)    Worsening cough? Yes, I am coughing up mucus.    Fever or chills? No    Headache: YES    Sore throat: YES    Chest pain: No    Diarrhea: YES    Body aches? YES- and fatigue    Nasal congestion    What treatments has patient tried? Guaifenesin (mucinex) and Nonsteroidals   Does patient live in a nursing home, group home, or shelter? No  Does patient have a way to get food/medications during quarantined? Yes, I have a friend or family member who can help me.                Review of Systems   Constitutional, HEENT, cardiovascular, pulmonary, gi and gu systems are negative, except as otherwise noted.      Objective           Vitals:  No vitals were obtained today due to virtual visit.    Physical Exam   GENERAL: Healthy, alert and no distress  EYES: Eyes grossly normal to inspection.  No discharge or erythema, or obvious scleral/conjunctival abnormalities.  HENT: Normal cephalic/atraumatic.  External ears,  nose and mouth without ulcers or lesions.  No nasal drainage visible.  NECK: No asymmetry, visible masses or scars  RESP: No audible wheeze, cough, or visible cyanosis.  No visible retractions or increased work of breathing.    SKIN: Visible skin clear. No significant rash, abnormal pigmentation or lesions.  NEURO: Cranial nerves grossly intact.  Mentation and speech appropriate for age.  PSYCH: Mentation appears normal, affect normal/bright, judgement and insight intact, normal speech and appearance well-groomed.                Video-Visit Details    Video Start Time: 5:28 PM    Type of service:  Video Visit    Video End Time:5:38 PM    Originating Location (pt. Location): Home    [facility]     Platform used for Video Visit: [employer]    Helene Saul

## 2022-09-23 ENCOUNTER — LAB (OUTPATIENT)
Dept: LAB | Facility: CLINIC | Age: 42
End: 2022-09-23
Payer: COMMERCIAL

## 2022-09-23 DIAGNOSIS — R31.0 GROSS HEMATURIA: ICD-10-CM

## 2022-09-23 DIAGNOSIS — Z13.9 SCREENING FOR CONDITION: Primary | ICD-10-CM

## 2022-09-23 DIAGNOSIS — F64.0 GENDER DYSPHORIA IN ADOLESCENT AND ADULT: ICD-10-CM

## 2022-09-23 LAB
ALBUMIN UR-MCNC: 100 MG/DL
APPEARANCE UR: CLEAR
BACTERIA #/AREA URNS HPF: ABNORMAL /HPF
BILIRUB UR QL STRIP: NEGATIVE
CHOLEST SERPL-MCNC: 231 MG/DL
COLOR UR AUTO: YELLOW
GLUCOSE UR STRIP-MCNC: NEGATIVE MG/DL
HDLC SERPL-MCNC: 65 MG/DL
HGB BLD-MCNC: 16.1 G/DL (ref 11.7–17.7)
HGB UR QL STRIP: ABNORMAL
KETONES UR STRIP-MCNC: NEGATIVE MG/DL
LDLC SERPL CALC-MCNC: 155 MG/DL
LEUKOCYTE ESTERASE UR QL STRIP: NEGATIVE
NITRATE UR QL: NEGATIVE
NONHDLC SERPL-MCNC: 166 MG/DL
PH UR STRIP: 7 [PH] (ref 5–8)
RBC #/AREA URNS AUTO: ABNORMAL /HPF
SP GR UR STRIP: 1.02 (ref 1–1.03)
SQUAMOUS #/AREA URNS AUTO: ABNORMAL /LPF
TRIGL SERPL-MCNC: 53 MG/DL
UROBILINOGEN UR STRIP-ACNC: 0.2 E.U./DL
WBC #/AREA URNS AUTO: ABNORMAL /HPF

## 2022-09-23 PROCEDURE — 80061 LIPID PANEL: CPT

## 2022-09-23 PROCEDURE — 84403 ASSAY OF TOTAL TESTOSTERONE: CPT

## 2022-09-23 PROCEDURE — 85018 HEMOGLOBIN: CPT

## 2022-09-23 PROCEDURE — 81001 URINALYSIS AUTO W/SCOPE: CPT

## 2022-09-23 PROCEDURE — 36415 COLL VENOUS BLD VENIPUNCTURE: CPT

## 2022-09-24 ENCOUNTER — HEALTH MAINTENANCE LETTER (OUTPATIENT)
Age: 42
End: 2022-09-24

## 2022-09-27 LAB — TESTOST SERPL-MCNC: 706 NG/DL (ref 8–950)

## 2022-11-10 ENCOUNTER — TELEPHONE (OUTPATIENT)
Dept: OBGYN | Facility: CLINIC | Age: 42
End: 2022-11-10

## 2022-11-10 NOTE — TELEPHONE ENCOUNTER
Left message for patient to call back in regards to rescheduling surgery - James Santos has left clinic and needs to be scheduled with [facility] only.    Alisia Cade  Clinical Services Assistant

## 2022-11-17 ENCOUNTER — OFFICE VISIT (OUTPATIENT)
Dept: FAMILY MEDICINE | Facility: CLINIC | Age: 42
End: 2022-11-17
Payer: COMMERCIAL

## 2022-11-17 ENCOUNTER — TELEPHONE (OUTPATIENT)
Dept: FAMILY MEDICINE | Facility: CLINIC | Age: 42
End: 2022-11-17

## 2022-11-17 ENCOUNTER — VIRTUAL VISIT (OUTPATIENT)
Dept: OBGYN | Facility: CLINIC | Age: 42
End: 2022-11-17
Attending: OBSTETRICS & GYNECOLOGY
Payer: COMMERCIAL

## 2022-11-17 ENCOUNTER — TELEPHONE (OUTPATIENT)
Dept: OBGYN | Facility: CLINIC | Age: 42
End: 2022-11-17

## 2022-11-17 VITALS
TEMPERATURE: 98.4 F | DIASTOLIC BLOOD PRESSURE: 83 MMHG | SYSTOLIC BLOOD PRESSURE: 120 MMHG | HEIGHT: 64 IN | HEART RATE: 71 BPM | OXYGEN SATURATION: 98 % | BODY MASS INDEX: 28.82 KG/M2 | WEIGHT: 168.8 LBS

## 2022-11-17 DIAGNOSIS — R31.9 HEMATURIA, UNSPECIFIED TYPE: ICD-10-CM

## 2022-11-17 DIAGNOSIS — Z87.891 PERSONAL HISTORY OF TOBACCO USE, PRESENTING HAZARDS TO HEALTH: ICD-10-CM

## 2022-11-17 DIAGNOSIS — N92.0 MENORRHAGIA WITH REGULAR CYCLE: Primary | ICD-10-CM

## 2022-11-17 DIAGNOSIS — F64.9 GENDER DYSPHORIA: ICD-10-CM

## 2022-11-17 DIAGNOSIS — R80.9 PROTEINURIA, UNSPECIFIED TYPE: ICD-10-CM

## 2022-11-17 DIAGNOSIS — Z80.52 FAMILY HISTORY OF BLADDER CANCER: ICD-10-CM

## 2022-11-17 DIAGNOSIS — N30.01 ACUTE CYSTITIS WITH HEMATURIA: Primary | ICD-10-CM

## 2022-11-17 LAB
ALBUMIN UR-MCNC: >=300 MG/DL
APPEARANCE UR: ABNORMAL
BACTERIA #/AREA URNS HPF: ABNORMAL /HPF
BILIRUB UR QL STRIP: NEGATIVE
COLOR UR AUTO: ABNORMAL
GLUCOSE UR STRIP-MCNC: NEGATIVE MG/DL
HGB UR QL STRIP: ABNORMAL
KETONES UR STRIP-MCNC: NEGATIVE MG/DL
LEUKOCYTE ESTERASE UR QL STRIP: ABNORMAL
NITRATE UR QL: POSITIVE
PH UR STRIP: 7 [PH] (ref 5–8)
RBC #/AREA URNS AUTO: ABNORMAL /HPF
SP GR UR STRIP: 1.02 (ref 1–1.03)
UROBILINOGEN UR STRIP-ACNC: 0.2 E.U./DL
WBC #/AREA URNS AUTO: ABNORMAL /HPF

## 2022-11-17 PROCEDURE — 99214 OFFICE O/P EST MOD 30 MIN: CPT | Mod: GC | Performed by: STUDENT IN AN ORGANIZED HEALTH CARE EDUCATION/TRAINING PROGRAM

## 2022-11-17 PROCEDURE — 87186 SC STD MICRODIL/AGAR DIL: CPT | Performed by: STUDENT IN AN ORGANIZED HEALTH CARE EDUCATION/TRAINING PROGRAM

## 2022-11-17 PROCEDURE — 81001 URINALYSIS AUTO W/SCOPE: CPT | Performed by: STUDENT IN AN ORGANIZED HEALTH CARE EDUCATION/TRAINING PROGRAM

## 2022-11-17 PROCEDURE — 87086 URINE CULTURE/COLONY COUNT: CPT | Performed by: STUDENT IN AN ORGANIZED HEALTH CARE EDUCATION/TRAINING PROGRAM

## 2022-11-17 PROCEDURE — 88112 CYTOPATH CELL ENHANCE TECH: CPT | Performed by: PATHOLOGY

## 2022-11-17 RX ORDER — NITROFURANTOIN 25; 75 MG/1; MG/1
100 CAPSULE ORAL 2 TIMES DAILY
Qty: 10 CAPSULE | Refills: 0 | Status: SHIPPED | OUTPATIENT
Start: 2022-11-17 | End: 2022-11-22

## 2022-11-17 RX ORDER — TESTOSTERONE CYPIONATE 200 MG/ML
60 INJECTION, SOLUTION INTRAMUSCULAR WEEKLY
Qty: 4 ML | Refills: 5 | Status: SHIPPED | OUTPATIENT
Start: 2022-11-17 | End: 2023-07-06

## 2022-11-17 NOTE — PROGRESS NOTES
Preceptor Attestation:    I discussed the patient with the resident and evaluated the patient in person. I have verified the content of the note, which accurately reflects my assessment of the patient and the plan of care.   Supervising Physician:  Indra Bell MD, MD.

## 2022-11-17 NOTE — LETTER
Date:November 18, 2022      Provider requested that no letter be sent. Do not send.       [facility]

## 2022-11-17 NOTE — PROGRESS NOTES
Assessment & Plan     Acute cystitis with hematuria  - nitroFURantoin macrocrystal-monohydrate (MACROBID) 100 MG capsule  Dispense: 10 capsule; Refill: 0  Urine culture pending.     Family history of bladder cancer  Hematuria, unspecified type  Personal history of tobacco use, presenting hazards to health  Proteinuria, unspecified type  Hematuria and proteinuria may be related to acute cystitis; however, they have had proteinuria and hematuria in the absence of urgency, frequency, and dysuria, as well, which warrants further consideration. Saw urology in June 2021. At that time, plan was to repeat UA after 1 week abstaining from sex, and if persistent hematuria, to get cystoscopy. Patient had random urine in Sept (unclear how it was ordered) that showed hematuria and proteinuria. At this point, refer back to urology for further workup and likely cystoscopy. Also ordered urine cytology, jenn given fam h/o bladder cancer (maternal grandfather) and personal h/o smoking (1 pack q3-4 days, didn't obtain total pack year history).   - Urine cytology pending  - Urology referral for likely cystoscopy     Discussed return precautions including but not limited to passage of large blood clots, persistent hematuria, fever, vomiting, or other new signs/symptoms concerning to patient.     Called patient after visit to discuss results and plan for macrobid pending urine culture results.     Marjorie Zambrano MD  [facility] DEBBY Smith is a 42 year old, presenting for the following health issues:  Urinary Problem (Pt present to discuss painful urination. Pt also explained that the urine had blood and blood clots in it. )    Pain with urination starting this AM  Not menstruating  Urgency   Frequency  Burning  All started today   Passed small clot of blood with urination today. First time that's happened.   Never had UTI before   [facility]   Went to urology in June 2021, didn't follow-up  "since then.   Reviewed 9/23 my chart messages AFTER their visit today.   No sexual activity within past 48 hours.   No new sexual partners recently.  No new sexual toys/lubes.   No nausea, vomiting, fever. No back pain.   LMP a couple of weeks ago.         Objective    /83   Pulse 71   Temp 98.4  F (36.9  C) (Oral)   Ht 1.618 m (5' 3.7\")   Wt 76.6 kg (168 lb 12.8 oz)   SpO2 98%   BMI 29.25 kg/m    Body mass index is 29.25 kg/m .  Physical Exam     Drinking water.   No acute distress.   Gait normal.   Skin normal and not pale appearing.   Deferred exam d/t time constraints.    Results for orders placed or performed in visit on 11/17/22 (from the past 24 hour(s))   UA reflex to Microscopic and Culture    Specimen: Urine, Midstream   Result Value Ref Range    Color Urine Demetrice (A) Colorless, Straw, Light Yellow, Yellow    Appearance Urine Turbid (A) Clear    Glucose Urine Negative Negative mg/dL    Bilirubin Urine Negative Negative    Ketones Urine Negative Negative mg/dL    Specific Gravity Urine 1.025 1.005 - 1.030    Blood Urine Large (A) Negative    pH Urine 7.0 5.0 - 8.0    Protein Albumin Urine >=300 (A) Negative mg/dL    Urobilinogen Urine 0.2 0.2, 1.0 E.U./dL    Nitrite Urine Positive (A) Negative    Leukocyte Esterase Urine Moderate (A) Negative   Urine Microscopic Exam   Result Value Ref Range    Bacteria Urine Few (A) None Seen /HPF    RBC Urine 25-50 (A) 0-2 /HPF /HPF    WBC Urine 25-50 (A) 0-5 /HPF /HPF       ----- Service Performed and Documented by Resident or Fellow ------              "

## 2022-11-17 NOTE — LETTER
11/17/2022       RE: Luis Gutierrez  [address]  [facility] [address]     Dear Colleague,    Thank you for referring your patient, Luis Gutierrez, to the [facility] [place] [facility]. Please see a copy of my visit note below.    Attempted to reach patient via phone call and there was no answer.  Message left to call back       Again, thank you for allowing me to participate in the care of your patient.      Sincerely,    Heide Mock MD

## 2022-11-17 NOTE — Clinical Note
Rosaliei- saw this pt today after passage of blood clot in urine. Has uti. Unsure if something else going on. Given hematuria in sept when they got ua, did refer back to urology. (Saw urology in June 2021, at which pt they were gonna get a ua and if persistent hematuria, advised cystoscopy)

## 2022-11-17 NOTE — TELEPHONE ENCOUNTER
[facility] Call Center    Phone Message    May a detailed message be left on voicemail: yes     Reason for Call: Other: .  Pt calling regarding telephone visit with Pukite today. Pt missed call and wondering if there is any way Pukite can give pt a call back today since appt is regarding surgery. Please call pt    Action Taken: Message routed to:  Other: [facility]    Travel Screening: Not Applicable

## 2022-11-18 ENCOUNTER — ALLIED HEALTH/NURSE VISIT (OUTPATIENT)
Dept: OBGYN | Facility: CLINIC | Age: 42
End: 2022-11-18
Attending: OBSTETRICS & GYNECOLOGY
Payer: COMMERCIAL

## 2022-11-18 VITALS
WEIGHT: 168 LBS | BODY MASS INDEX: 29.11 KG/M2 | DIASTOLIC BLOOD PRESSURE: 70 MMHG | HEART RATE: 84 BPM | SYSTOLIC BLOOD PRESSURE: 123 MMHG

## 2022-11-18 DIAGNOSIS — N92.0 MENORRHAGIA WITH REGULAR CYCLE: Primary | ICD-10-CM

## 2022-11-18 DIAGNOSIS — F64.9 GENDER DYSPHORIA: ICD-10-CM

## 2022-11-18 LAB
BACTERIA UR CULT: ABNORMAL
C TRACH DNA SPEC QL NAA+PROBE: NEGATIVE
N GONORRHOEA DNA SPEC QL NAA+PROBE: NEGATIVE

## 2022-11-18 PROCEDURE — G0463 HOSPITAL OUTPT CLINIC VISIT: HCPCS

## 2022-11-18 PROCEDURE — 99214 OFFICE O/P EST MOD 30 MIN: CPT | Mod: KX | Performed by: OBSTETRICS & GYNECOLOGY

## 2022-11-18 ASSESSMENT — PAIN SCALES - GENERAL: PAINLEVEL: NO PAIN (0)

## 2022-11-18 NOTE — TELEPHONE ENCOUNTER
Patient is scheduled to see Dr. Mock today at 1:15pm per Dr. Mock request.     Will send a [employer] message to the patient.

## 2022-11-18 NOTE — PROGRESS NOTES
Chief Complaint   Patient presents with     RECHECK     Meet/greet for Hysterectomy surgery   Vanessa Sheth LPN

## 2022-11-18 NOTE — PROGRESS NOTES
Patient presents to review plan for surgery initially scheduled with Dr. Chirinos.  The plan is for laparoscopic supracervical hysterectomy and BSO.  They have a history of 1  and had top surgery earlier this year.      Past Medical History:   Diagnosis Date     Hypertension 5/10/12    Preeclampsia     Past Surgical History:   Procedure Laterality Date      SECTION  2012     MASTECTOMY SIMPLE BILATERAL Bilateral 2022    Procedure: MASTECTOMY, BILATERAL, SIMPLE, WITH NIPPLE GRAFTS, ONQ;  Surgeon: Fiona Lama MD;  Location: [facility] OR     Physical exam:  /70   Pulse 84   Wt 76.2 kg (168 lb)   BMI 29.11 kg/m    Gen'l: appears well  No further exam.    Ultrasound 22: uterus 8.2 x 5.2 x 4.7cm, normal ovaries    Assessment/Plan:  41 yo  with plan for gender affirming hysterectomy and management of menorrhagia and dysmenorrhea    - reviewed plan for laparoscopic supracervical hysterectomy and BSO.  Discussed incision at umbilicus will be larger for specimen removal.  - reviewed typical recovery, patient is unsure if paperwork will be required for work, but will send if needed.  - reviewed surgical risks and instructions for day of surgery.    Heide Mock MD     On the day of this encounter 30 minutes of time was spent in consultation with face-to-face discussion, review of historical information, examination, documentation and post visit activities including communication with other providers and coordination of patient care.    Heide Mock MD

## 2022-11-21 LAB
PATH REPORT.COMMENTS IMP SPEC: NORMAL
PATH REPORT.COMMENTS IMP SPEC: NORMAL
PATH REPORT.FINAL DX SPEC: NORMAL
PATH REPORT.FINAL DX SPEC: NORMAL
PATH REPORT.GROSS SPEC: NORMAL
PATH REPORT.GROSS SPEC: NORMAL
PATH REPORT.MICROSCOPIC SPEC OTHER STN: NORMAL
PATH REPORT.RELEVANT HX SPEC: NORMAL
PATH REPORT.RELEVANT HX SPEC: NORMAL

## 2022-11-22 NOTE — RESULT ENCOUNTER NOTE
Hi there,     There were two parts to the bladder cancer test we ran-- one is called cytology (looking for cancerous cells) and the other is called FISH (looking for biomarkers of cancer). The cytology was negative (no cancerous cells). There weren't enough cells to do FISH. We can repeat this if you are interested (jenn after your acute infection has resolved), or can defer further work-up/mgmt to urology. You choose!    Tell me if you want me to order a repeat urine test. Thanks.     -Marjorie yes...

## 2022-11-28 ENCOUNTER — MYC MEDICAL ADVICE (OUTPATIENT)
Dept: OBGYN | Facility: CLINIC | Age: 42
End: 2022-11-28

## 2022-12-06 ENCOUNTER — OFFICE VISIT (OUTPATIENT)
Dept: FAMILY MEDICINE | Facility: CLINIC | Age: 42
End: 2022-12-06
Payer: COMMERCIAL

## 2022-12-06 VITALS
HEIGHT: 63 IN | DIASTOLIC BLOOD PRESSURE: 71 MMHG | WEIGHT: 170.6 LBS | HEART RATE: 64 BPM | TEMPERATURE: 98.3 F | RESPIRATION RATE: 16 BRPM | OXYGEN SATURATION: 98 % | BODY MASS INDEX: 30.23 KG/M2 | SYSTOLIC BLOOD PRESSURE: 115 MMHG

## 2022-12-06 DIAGNOSIS — R31.9 HEMATURIA, UNSPECIFIED TYPE: ICD-10-CM

## 2022-12-06 DIAGNOSIS — Z01.818 PREOP GENERAL PHYSICAL EXAM: Primary | ICD-10-CM

## 2022-12-06 DIAGNOSIS — Z23 HIGH PRIORITY FOR 2019-NCOV VACCINE: ICD-10-CM

## 2022-12-06 DIAGNOSIS — F64.9 GENDER DYSPHORIA: ICD-10-CM

## 2022-12-06 LAB
ANION GAP SERPL CALCULATED.3IONS-SCNC: 11 MMOL/L (ref 7–15)
BUN SERPL-MCNC: 14.4 MG/DL (ref 6–20)
CALCIUM SERPL-MCNC: 9.2 MG/DL (ref 8.6–10)
CHLORIDE SERPL-SCNC: 102 MMOL/L (ref 98–107)
CREAT SERPL-MCNC: 0.92 MG/DL (ref 0.51–1.17)
DEPRECATED HCO3 PLAS-SCNC: 25 MMOL/L (ref 22–29)
GFR SERPL CREATININE-BSD FRML MDRD: 80 ML/MIN/1.73M2
GLUCOSE SERPL-MCNC: 92 MG/DL (ref 70–99)
POTASSIUM SERPL-SCNC: 3.9 MMOL/L (ref 3.4–5.3)
SODIUM SERPL-SCNC: 138 MMOL/L (ref 136–145)

## 2022-12-06 PROCEDURE — 0124A COVID-19 VACCINE BIVALENT BOOSTER 12+ (PFIZER): CPT | Performed by: STUDENT IN AN ORGANIZED HEALTH CARE EDUCATION/TRAINING PROGRAM

## 2022-12-06 PROCEDURE — 36415 COLL VENOUS BLD VENIPUNCTURE: CPT | Performed by: STUDENT IN AN ORGANIZED HEALTH CARE EDUCATION/TRAINING PROGRAM

## 2022-12-06 PROCEDURE — 99214 OFFICE O/P EST MOD 30 MIN: CPT | Mod: 25 | Performed by: STUDENT IN AN ORGANIZED HEALTH CARE EDUCATION/TRAINING PROGRAM

## 2022-12-06 PROCEDURE — 91312 COVID-19 VACCINE BIVALENT BOOSTER 12+ (PFIZER): CPT | Performed by: STUDENT IN AN ORGANIZED HEALTH CARE EDUCATION/TRAINING PROGRAM

## 2022-12-06 PROCEDURE — 80048 BASIC METABOLIC PNL TOTAL CA: CPT | Performed by: STUDENT IN AN ORGANIZED HEALTH CARE EDUCATION/TRAINING PROGRAM

## 2022-12-06 ASSESSMENT — PATIENT HEALTH QUESTIONNAIRE - PHQ9: SUM OF ALL RESPONSES TO PHQ QUESTIONS 1-9: 1

## 2022-12-06 NOTE — PATIENT INSTRUCTIONS
Preparing for Your Surgery  Getting started  A nurse will call you to review your health history and instructions. They will give you an arrival time based on your scheduled surgery time. Please be ready to share:    Your doctor's clinic name and phone number    Your medical, surgical, and anesthesia history    A list of allergies and sensitivities    A list of medicines, including herbal treatments and over-the-counter drugs    Whether the patient has a legal guardian (ask how to send us the papers in advance)  Please tell us if you're pregnant--or if there's any chance you might be pregnant. Some surgeries may injure a fetus (unborn baby), so they require a pregnancy test. Surgeries that are safe for a fetus don't always need a test, and you can choose whether to have one.   If you have a child who's having surgery, please ask for a copy of Preparing for Your Child's Surgery.    Preparing for surgery    Within 10 to 30 days of surgery: Have a pre-op exam (sometimes called an H&P, or History and Physical). This can be done at a clinic or pre-operative center.  ? If you're having a , you may not need this exam. Talk to your care team.    At your pre-op exam, talk to your care team about all medicines you take. If you need to stop any medicines before surgery, ask when to start taking them again.  ? We do this for your safety. Many medicines can make you bleed too much during surgery. Some change how well surgery (anesthesia) drugs work.    Call your insurance company to let them know you're having surgery. (If you don't have insurance, call [phone].)    Call your clinic if there's any change in your health. This includes signs of a cold or flu (sore throat, runny nose, cough, rash, fever). It also includes a scrape or scratch near the surgery site.    If you have questions on the day of surgery, call your hospital or surgery center.  COVID testing  You may need to be tested for COVID-19 before having  surgery. If so, we will give you instructions (or click here).  Eating and drinking guidelines  For your safety: Unless your surgeon tells you otherwise, follow the guidelines below.    Eat and drink as usual until 8 hours before you arrive for surgery. After that, no food or milk.    Drink clear liquids until 2 hours before you arrive. These are liquids you can see through, like water, Gatorade, and Propel Water. They also include plain black coffee and tea (no cream or milk), candy, and breath mints. You can spit out gum when you arrive.    If you drink alcohol: Stop drinking it the night before surgery.    If your care team tells you to take medicine on the morning of surgery, it's okay to take it with a sip of water.  Preventing infection    Shower or bathe the night before and morning of your surgery. Follow the instructions your clinic gave you. (If no instructions, use regular soap.)    Don't shave or clip hair near your surgery site. We'll remove the hair if needed.    Don't smoke or vape the morning of surgery. You may chew nicotine gum up to 2 hours before surgery. A nicotine patch is okay.  ? Note: Some surgeries require you to completely quit smoking and nicotine. Check with your surgeon.    Your care team will make every effort to keep you safe from infection. We will:  ? Clean our hands often with soap and water (or an alcohol-based hand rub).  ? Clean the skin at your surgery site with a special soap that kills germs.  ? Give you a special gown to keep you warm. (Cold raises the risk of infection.)  ? Wear special hair covers, masks, gowns and gloves during surgery.  ? Give antibiotic medicine, if prescribed. Not all surgeries need antibiotics.  What to bring on the day of surgery    Photo ID and insurance card    Copy of your health care directive, if you have one    Glasses and hearing aids (bring cases)  ? You can't wear contacts during surgery    Inhaler and eye drops, if you use them (tell us  about these when you arrive)    CPAP machine or breathing device, if you use them    A few personal items, if spending the night    If you have . . .  ? A pacemaker, ICD (cardiac defibrillator) or other implant: Bring the ID card.  ? An implanted stimulator: Bring the remote control.  ? A legal guardian: Bring a copy of the certified (court-stamped) guardianship papers.  Please remove any jewelry, including body piercings. Leave jewelry and other valuables at home.  If you're going home the day of surgery    You must have a responsible adult drive you home. They should stay with you overnight as well.    If you don't have someone to stay with you, and you aren't safe to go home alone, we may keep you overnight. Insurance often won't pay for this.  After surgery  If it's hard to control your pain or you need more pain medicine, please call your surgeon's office.  Questions?   If you have any questions for your care team, list them here: _________________________________________________________________________________________________________________________________________________________________________ ____________________________________ ____________________________________ ____________________________________  For informational purposes only. Not to replace the advice of your health care provider. Copyright   2003, 2019 [employer] [facility]. All rights reserved. Clinically reviewed by Joy Lovell MD. [employer] [id number] - REV 10/22.    Preparing for Your Surgery  Getting started  A nurse will call you to review your health history and instructions. They will give you an arrival time based on your scheduled surgery time. Please be ready to share:    Your doctor's clinic name and phone number    Your medical, surgical, and anesthesia history    A list of allergies and sensitivities    A list of medicines, including herbal treatments and over-the-counter drugs    Whether the patient has a legal guardian (ask how to  send us the papers in advance)  Please tell us if you're pregnant--or if there's any chance you might be pregnant. Some surgeries may injure a fetus (unborn baby), so they require a pregnancy test. Surgeries that are safe for a fetus don't always need a test, and you can choose whether to have one.   If you have a child who's having surgery, please ask for a copy of Preparing for Your Child's Surgery.    Preparing for surgery    Within 10 to 30 days of surgery: Have a pre-op exam (sometimes called an H&P, or History and Physical). This can be done at a clinic or pre-operative center.  ? If you're having a , you may not need this exam. Talk to your care team.    At your pre-op exam, talk to your care team about all medicines you take. If you need to stop any medicines before surgery, ask when to start taking them again.  ? We do this for your safety. Many medicines can make you bleed too much during surgery. Some change how well surgery (anesthesia) drugs work.    Call your insurance company to let them know you're having surgery. (If you don't have insurance, call [phone].)    Call your clinic if there's any change in your health. This includes signs of a cold or flu (sore throat, runny nose, cough, rash, fever). It also includes a scrape or scratch near the surgery site.    If you have questions on the day of surgery, call your hospital or surgery center.  COVID testing  You may need to be tested for COVID-19 before having surgery. If so, we will give you instructions (or click here).  Eating and drinking guidelines  For your safety: Unless your surgeon tells you otherwise, follow the guidelines below.    Eat and drink as usual until 8 hours before you arrive for surgery. After that, no food or milk.    Drink clear liquids until 2 hours before you arrive. These are liquids you can see through, like water, Gatorade, and Propel Water. They also include plain black coffee and tea (no cream or milk),  candy, and breath mints. You can spit out gum when you arrive.    If you drink alcohol: Stop drinking it the night before surgery.    If your care team tells you to take medicine on the morning of surgery, it's okay to take it with a sip of water.  Preventing infection    Shower or bathe the night before and morning of your surgery. Follow the instructions your clinic gave you. (If no instructions, use regular soap.)    Don't shave or clip hair near your surgery site. We'll remove the hair if needed.    Don't smoke or vape the morning of surgery. You may chew nicotine gum up to 2 hours before surgery. A nicotine patch is okay.  ? Note: Some surgeries require you to completely quit smoking and nicotine. Check with your surgeon.    Your care team will make every effort to keep you safe from infection. We will:  ? Clean our hands often with soap and water (or an alcohol-based hand rub).  ? Clean the skin at your surgery site with a special soap that kills germs.  ? Give you a special gown to keep you warm. (Cold raises the risk of infection.)  ? Wear special hair covers, masks, gowns and gloves during surgery.  ? Give antibiotic medicine, if prescribed. Not all surgeries need antibiotics.  What to bring on the day of surgery    Photo ID and insurance card    Copy of your health care directive, if you have one    Glasses and hearing aids (bring cases)  ? You can't wear contacts during surgery    Inhaler and eye drops, if you use them (tell us about these when you arrive)    CPAP machine or breathing device, if you use them    A few personal items, if spending the night    If you have . . .  ? A pacemaker, ICD (cardiac defibrillator) or other implant: Bring the ID card.  ? An implanted stimulator: Bring the remote control.  ? A legal guardian: Bring a copy of the certified (court-stamped) guardianship papers.  Please remove any jewelry, including body piercings. Leave jewelry and other valuables at home.  If you're going  home the day of surgery    You must have a responsible adult drive you home. They should stay with you overnight as well.    If you don't have someone to stay with you, and you aren't safe to go home alone, we may keep you overnight. Insurance often won't pay for this.  After surgery  If it's hard to control your pain or you need more pain medicine, please call your surgeon's office.  Questions?   If you have any questions for your care team, list them here: _________________________________________________________________________________________________________________________________________________________________________ ____________________________________ ____________________________________ ____________________________________  For informational purposes only. Not to replace the advice of your health care provider. Copyright   2003, 2019 [employer]. All rights reserved. Clinically reviewed by Joy Lovell MD. [employer] [id number] - REV 10/22.    Preparing for Your Surgery  Getting started  A nurse will call you to review your health history and instructions. They will give you an arrival time based on your scheduled surgery time. Please be ready to share:    Your doctor's clinic name and phone number    Your medical, surgical, and anesthesia history    A list of allergies and sensitivities    A list of medicines, including herbal treatments and over-the-counter drugs    Whether the patient has a legal guardian (ask how to send us the papers in advance)  Please tell us if you're pregnant--or if there's any chance you might be pregnant. Some surgeries may injure a fetus (unborn baby), so they require a pregnancy test. Surgeries that are safe for a fetus don't always need a test, and you can choose whether to have one.   If you have a child who's having surgery, please ask for a copy of Preparing for Your Child's Surgery.    Preparing for surgery    Within 10 to 30 days of surgery: Have a pre-op exam  (sometimes called an H&P, or History and Physical). This can be done at a clinic or pre-operative center.  ? If you're having a , you may not need this exam. Talk to your care team.    At your pre-op exam, talk to your care team about all medicines you take. If you need to stop any medicines before surgery, ask when to start taking them again.  ? We do this for your safety. Many medicines can make you bleed too much during surgery. Some change how well surgery (anesthesia) drugs work.    Call your insurance company to let them know you're having surgery. (If you don't have insurance, call [phone].)    Call your clinic if there's any change in your health. This includes signs of a cold or flu (sore throat, runny nose, cough, rash, fever). It also includes a scrape or scratch near the surgery site.    If you have questions on the day of surgery, call your hospital or surgery center.  COVID testing  You may need to be tested for COVID-19 before having surgery. If so, we will give you instructions (or click here).  Eating and drinking guidelines  For your safety: Unless your surgeon tells you otherwise, follow the guidelines below.    Eat and drink as usual until 8 hours before you arrive for surgery. After that, no food or milk.    Drink clear liquids until 2 hours before you arrive. These are liquids you can see through, like water, Gatorade, and Propel Water. They also include plain black coffee and tea (no cream or milk), candy, and breath mints. You can spit out gum when you arrive.    If you drink alcohol: Stop drinking it the night before surgery.    If your care team tells you to take medicine on the morning of surgery, it's okay to take it with a sip of water.  Preventing infection    Shower or bathe the night before and morning of your surgery. Follow the instructions your clinic gave you. (If no instructions, use regular soap.)    Don't shave or clip hair near your surgery site. We'll remove the  hair if needed.    Don't smoke or vape the morning of surgery. You may chew nicotine gum up to 2 hours before surgery. A nicotine patch is okay.  ? Note: Some surgeries require you to completely quit smoking and nicotine. Check with your surgeon.    Your care team will make every effort to keep you safe from infection. We will:  ? Clean our hands often with soap and water (or an alcohol-based hand rub).  ? Clean the skin at your surgery site with a special soap that kills germs.  ? Give you a special gown to keep you warm. (Cold raises the risk of infection.)  ? Wear special hair covers, masks, gowns and gloves during surgery.  ? Give antibiotic medicine, if prescribed. Not all surgeries need antibiotics.  What to bring on the day of surgery    Photo ID and insurance card    Copy of your health care directive, if you have one    Glasses and hearing aids (bring cases)  ? You can't wear contacts during surgery    Inhaler and eye drops, if you use them (tell us about these when you arrive)    CPAP machine or breathing device, if you use them    A few personal items, if spending the night    If you have . . .  ? A pacemaker, ICD (cardiac defibrillator) or other implant: Bring the ID card.  ? An implanted stimulator: Bring the remote control.  ? A legal guardian: Bring a copy of the certified (court-stamped) guardianship papers.  Please remove any jewelry, including body piercings. Leave jewelry and other valuables at home.  If you're going home the day of surgery    You must have a responsible adult drive you home. They should stay with you overnight as well.    If you don't have someone to stay with you, and you aren't safe to go home alone, we may keep you overnight. Insurance often won't pay for this.  After surgery  If it's hard to control your pain or you need more pain medicine, please call your surgeon's office.  Questions?   If you have any questions for your care team, list them here:  _________________________________________________________________________________________________________________________________________________________________________ ____________________________________ ____________________________________ ____________________________________  For informational purposes only. Not to replace the advice of your health care provider. Copyright   2003, 2019 [facility]. All rights reserved. Clinically reviewed by Joy Lovell MD. [employer] [id number] - REV 10/22.

## 2022-12-06 NOTE — PROGRESS NOTES
[address]  [address] [place] [id number]  Phone: [phone]  Fax: [phone]  Primary Provider: Oralia Hernandez  Pre-op Performing Provider: ORALIA HERNANDEZ      PREOPERATIVE EVALUATION:  Today's date: 12/6/2022    Luis Gutierrez is a 42 year old adult who presents for a preoperative evaluation.    Surgical Information:  Surgery/Procedure: Hysterectomy, Supracervical, Laparoscopic with Salpingo- Oophorectomy  Surgery Location: [facility]  Surgeon: Heide Mock MD  Surgery Date: 12/20/2022  Time of Surgery: 3:55 pm  Where patient plans to recover: At home with family  Fax number for surgical facility: Note does not need to be faxed, will be available electronically in Epic.    Type of Anesthesia Anticipated: General    Assessment & Plan     The proposed surgical procedure is considered INTERMEDIATE risk.    Preop general physical exam  Due for annual BMP for testosterone therapy; will update prior to surgery. Hgb in September wnl.   - Basic metabolic panel  - Basic metabolic panel    High priority for 2019-nCoV vaccine  - COVID-19,PF,PFIZER BOOSTER BIVALENT 12+Yrs    Hematuria, unspecified type  Discussed plan to recheck UA 2-3 mos post op and refer if persistent     Gender dysphoria  Discussed T dosing post BLO - pt prefers to continue at current dose and follow up with me in 3-4 mos to recheck T level and symptoms to decide if dose increase needed.        Risks and Recommendations:  The patient has the following additional risks and recommendations for perioperative complications:   - No identified additional risk factors other than previously addressed    Medication Instructions:  Patient is on no chronic daily medications.     RECOMMENDATION:  APPROVAL GIVEN to proceed with proposed procedure, without further diagnostic evaluation.    Ordering of each unique test  Prescription drug management  22 minutes spent on the date of the encounter doing chart  review, history and exam, documentation and further activities per the note      Subjective            HPI related to upcoming procedure:     Preop Questions 12/6/2022   1. Have you ever had a heart attack or stroke? No   2. Have you ever had surgery on your heart or blood vessels, such as a stent placement, a coronary artery bypass, or surgery on an artery in your head, neck, heart, or legs? No   3. Do you have chest pain with activity? No   4. Do you have a history of  heart failure? No   5. Do you currently have a cold, bronchitis or symptoms of other infection? No   6. Do you have a cough, shortness of breath, or wheezing? No   7. Do you or anyone in your family have previous history of blood clots? No   8. Do you or does anyone in your family have a serious bleeding problem such as prolonged bleeding following surgeries or cuts? No   9. Have you ever had problems with anemia or been told to take iron pills? No   10. Have you had any abnormal blood loss such as black, tarry or bloody stools, or abnormal vaginal bleeding? No   11. Have you ever had a blood transfusion? No   12. Are you willing to have a blood transfusion if it is medically needed before, during, or after your surgery? Yes   13. Have you or any of your relatives ever had problems with anesthesia? No   14. Do you have sleep apnea, excessive snoring or daytime drowsiness? No   15. Do you have any artifical heart valves or other implanted medical devices like a pacemaker, defibrillator, or continuous glucose monitor? No   16. Do you have artificial joints? No   17. Are you allergic to latex? No   18. Is there any chance that you may be pregnant? No     Preoperative Review of :   reviewed - controlled substances reflected in medication list.      Review of Systems  Constitutional, neuro, ENT, endocrine, pulmonary, cardiac, gastrointestinal, genitourinary, musculoskeletal, integument and psychiatric systems are negative, except as otherwise  "noted.    Patient Active Problem List    Diagnosis Date Noted     Family history of bladder cancer 2022     Priority: Medium     Maternal grandfather in 50s. Living       Dyslipidemia 2021     Priority: Medium     On 2021, 3 months after starting T:     (from 125)     (from 214)     Next lipids - will do fasting.             Family history of malignant neoplasm of breast 02/10/2021     Priority: Medium     Mom dx triple negative breast CA , at age 62       Receiving gender affirming care 2020     Priority: Medium     Luis   They/them   HRT start date: 2020   Current T dose: 60 mg weekly   Last dose change: 21   Last labs: 2021. Lipids, TT, E2 21 for persistent menses.    Possible procedural considerations:  - Tubal ligation  - Top surgery       Nicotine dependence, uncomplicated, unspecified nicotine product type 2020     Priority: Medium     Not smoking as of end of 2020.  Adverse reaction to Chantix.       Depression 2020     Priority: Medium     Preeclampsia 10/05/2020     Priority: Medium     History of hemolysis, elevated liver enzymes, and low platelet (HELLP) syndrome 10/05/2020     Priority: Medium     S/P  section 2012     Priority: Medium      Past Medical History:   Diagnosis Date     Hypertension 5/10/12    Preeclampsia     Past Surgical History:   Procedure Laterality Date      SECTION  2012     MASTECTOMY SIMPLE BILATERAL Bilateral 2022    Procedure: MASTECTOMY, BILATERAL, SIMPLE, WITH NIPPLE GRAFTS, ONQ;  Surgeon: Fiona Lama MD;  Location: [facility] OR     Current Outpatient Medications   Medication Sig Dispense Refill     needle, disp, 18G X 1\" MISC Use to draw up hormones once weekly 25 each 1     Needle, Disp, 25G X 5/8\" MISC 1 each once a week 50 each 1     syringe, disposable, (B-D SYRINGE LUER-COURTNEY) 1 ML MISC Use to draw hormones weekly 25 each 1     testosterone cypionate " "(DEPOTESTOSTERONE) 200 MG/ML injection INJECT 0.3 MLS (60 MG) SUBCUTANEOUS ONCE A WEEK 4 mL 5       No Known Allergies     Social History     Tobacco Use     Smoking status: Former     Packs/day: 0.15     Years: 20.00     Pack years: 3.00     Types: Cigarettes     Start date: 2000     Quit date: 2020     Years since quittin.0     Smokeless tobacco: Never   Substance Use Topics     Alcohol use: Yes     Family History   Problem Relation Age of Onset     Hypertension Father      Substance Abuse Father      Cancer Maternal Grandfather 50        Bladder cancer. Smoker.     Heart Disease Maternal Grandfather      Asthma Daughter      Breast Cancer Mother      Glaucoma No family hx of      Macular Degeneration No family hx of      History   Drug Use     Types: Marijuana         Objective     /71 (BP Location: Left arm, Patient Position: Sitting, Cuff Size: Adult Large)   Pulse 64   Temp 98.3  F (36.8  C) (Oral)   Resp 16   Ht 1.607 m (5' 3.27\")   Wt 77.4 kg (170 lb 9.6 oz)   SpO2 98%   BMI 29.96 kg/m      Physical Exam    GENERAL APPEARANCE: healthy, alert and no distress     EYES: EOMI, PERRL     HENT: ear canals and TM's normal and nose and mouth without ulcers or lesions     NECK: no adenopathy, no asymmetry, masses, or scars and thyroid normal to palpation     RESP: lungs clear to auscultation - no rales, rhonchi or wheezes     CV: regular rates and rhythm, normal S1 S2, no S3 or S4 and no murmur, click or rub     MS: extremities normal- no gross deformities noted, no evidence of inflammation in joints, FROM in all extremities.     SKIN: no suspicious lesions or rashes     NEURO: Normal strength and tone, sensory exam grossly normal, mentation intact and speech normal     PSYCH: mentation appears normal. and affect normal/bright     LYMPHATICS: No cervical adenopathy    Recent Labs   Lab Test 22  0813 10/11/21  1446 21  1607 21  1305 21  1114 02/15/21  1559   HGB 16.1  " --   --   --  14.2 14.0   PLT  --   --   --   --   --  271   NA  --  138 137   < >  --  135.9   POTASSIUM  --  3.6 3.8   < >  --  3.8   CR  --  1.02 0.91   < >  --  0.9    < > = values in this interval not displayed.        Diagnostics:  Labs pending at this time.  Results will be reviewed when available.   No EKG required, no history of coronary heart disease, significant arrhythmia, peripheral arterial disease or other structural heart disease.    Revised Cardiac Risk Index (RCRI):  The patient has the following serious cardiovascular risks for perioperative complications:   - No serious cardiac risks = 0 points     RCRI Interpretation: 0 points: Class I (very low risk - 0.4% complication rate)      Signed Electronically by: Oralia Ha DO  Copy of this evaluation report is provided to requesting physician.

## 2022-12-19 ENCOUNTER — ANESTHESIA EVENT (OUTPATIENT)
Dept: SURGERY | Facility: CLINIC | Age: 42
End: 2022-12-19
Payer: COMMERCIAL

## 2022-12-20 ENCOUNTER — ANESTHESIA (OUTPATIENT)
Dept: SURGERY | Facility: CLINIC | Age: 42
End: 2022-12-20
Payer: COMMERCIAL

## 2022-12-20 ENCOUNTER — HOSPITAL ENCOUNTER (OUTPATIENT)
Facility: CLINIC | Age: 42
Discharge: HOME | End: 2022-12-20
Attending: OBSTETRICS & GYNECOLOGY | Admitting: OBSTETRICS & GYNECOLOGY
Payer: COMMERCIAL

## 2022-12-20 VITALS
DIASTOLIC BLOOD PRESSURE: 84 MMHG | HEIGHT: 63 IN | BODY MASS INDEX: 30.04 KG/M2 | SYSTOLIC BLOOD PRESSURE: 125 MMHG | WEIGHT: 169.53 LBS | OXYGEN SATURATION: 99 % | HEART RATE: 63 BPM | RESPIRATION RATE: 16 BRPM | TEMPERATURE: 97.5 F

## 2022-12-20 DIAGNOSIS — Z90.710 S/P HYSTERECTOMY: Primary | ICD-10-CM

## 2022-12-20 LAB
ABO/RH(D): NORMAL
ANTIBODY SCREEN: NEGATIVE
BASOPHILS # BLD AUTO: 0 10E3/UL (ref 0–0.2)
BASOPHILS NFR BLD AUTO: 0 %
EOSINOPHIL # BLD AUTO: 0.1 10E3/UL (ref 0–0.7)
EOSINOPHIL NFR BLD AUTO: 2 %
ERYTHROCYTE [DISTWIDTH] IN BLOOD BY AUTOMATED COUNT: 11.8 % (ref 10–15)
HCT VFR BLD AUTO: 41.6 % (ref 35–53)
HGB BLD-MCNC: 14.6 G/DL (ref 11.7–17.7)
IMM GRANULOCYTES # BLD: 0 10E3/UL
IMM GRANULOCYTES NFR BLD: 0 %
LYMPHOCYTES # BLD AUTO: 2 10E3/UL (ref 0.8–5.3)
LYMPHOCYTES NFR BLD AUTO: 27 %
MCH RBC QN AUTO: 31.5 PG (ref 26.5–33)
MCHC RBC AUTO-ENTMCNC: 35.1 G/DL (ref 31.5–36.5)
MCV RBC AUTO: 90 FL (ref 78–100)
MONOCYTES # BLD AUTO: 0.4 10E3/UL (ref 0–1.3)
MONOCYTES NFR BLD AUTO: 6 %
NEUTROPHILS # BLD AUTO: 4.8 10E3/UL (ref 1.6–8.3)
NEUTROPHILS NFR BLD AUTO: 65 %
NRBC # BLD AUTO: 0 10E3/UL
NRBC BLD AUTO-RTO: 0 /100
PLATELET # BLD AUTO: 224 10E3/UL (ref 150–450)
RBC # BLD AUTO: 4.63 10E6/UL (ref 3.8–5.9)
SPECIMEN EXPIRATION DATE: NORMAL
WBC # BLD AUTO: 7.4 10E3/UL (ref 4–11)

## 2022-12-20 PROCEDURE — 258N000003 HC RX IP 258 OP 636: Performed by: ANESTHESIOLOGY

## 2022-12-20 PROCEDURE — 250N000009 HC RX 250: Performed by: ANESTHESIOLOGY

## 2022-12-20 PROCEDURE — 250N000013 HC RX MED GY IP 250 OP 250 PS 637: Performed by: STUDENT IN AN ORGANIZED HEALTH CARE EDUCATION/TRAINING PROGRAM

## 2022-12-20 PROCEDURE — 86850 RBC ANTIBODY SCREEN: CPT | Performed by: STUDENT IN AN ORGANIZED HEALTH CARE EDUCATION/TRAINING PROGRAM

## 2022-12-20 PROCEDURE — 58542 LSH W/T/O UT 250 G OR LESS: CPT | Mod: GC | Performed by: OBSTETRICS & GYNECOLOGY

## 2022-12-20 PROCEDURE — 250N000011 HC RX IP 250 OP 636

## 2022-12-20 PROCEDURE — 272N000001 HC OR GENERAL SUPPLY STERILE: Performed by: OBSTETRICS & GYNECOLOGY

## 2022-12-20 PROCEDURE — 86901 BLOOD TYPING SEROLOGIC RH(D): CPT | Performed by: STUDENT IN AN ORGANIZED HEALTH CARE EDUCATION/TRAINING PROGRAM

## 2022-12-20 PROCEDURE — 710N000012 HC RECOVERY PHASE 2, PER MINUTE: Performed by: OBSTETRICS & GYNECOLOGY

## 2022-12-20 PROCEDURE — 88307 TISSUE EXAM BY PATHOLOGIST: CPT | Mod: TC | Performed by: OBSTETRICS & GYNECOLOGY

## 2022-12-20 PROCEDURE — 999N000141 HC STATISTIC PRE-PROCEDURE NURSING ASSESSMENT: Performed by: OBSTETRICS & GYNECOLOGY

## 2022-12-20 PROCEDURE — 370N000017 HC ANESTHESIA TECHNICAL FEE, PER MIN: Performed by: OBSTETRICS & GYNECOLOGY

## 2022-12-20 PROCEDURE — 250N000009 HC RX 250

## 2022-12-20 PROCEDURE — 250N000025 HC SEVOFLURANE, PER MIN: Performed by: OBSTETRICS & GYNECOLOGY

## 2022-12-20 PROCEDURE — 250N000011 HC RX IP 250 OP 636: Performed by: ANESTHESIOLOGY

## 2022-12-20 PROCEDURE — 360N000077 HC SURGERY LEVEL 4, PER MIN: Performed by: OBSTETRICS & GYNECOLOGY

## 2022-12-20 PROCEDURE — 250N000011 HC RX IP 250 OP 636: Performed by: STUDENT IN AN ORGANIZED HEALTH CARE EDUCATION/TRAINING PROGRAM

## 2022-12-20 PROCEDURE — 85014 HEMATOCRIT: CPT | Performed by: STUDENT IN AN ORGANIZED HEALTH CARE EDUCATION/TRAINING PROGRAM

## 2022-12-20 PROCEDURE — 258N000003 HC RX IP 258 OP 636

## 2022-12-20 PROCEDURE — 710N000010 HC RECOVERY PHASE 1, LEVEL 2, PER MIN: Performed by: OBSTETRICS & GYNECOLOGY

## 2022-12-20 RX ORDER — SODIUM CHLORIDE, SODIUM LACTATE, POTASSIUM CHLORIDE, CALCIUM CHLORIDE 600; 310; 30; 20 MG/100ML; MG/100ML; MG/100ML; MG/100ML
INJECTION, SOLUTION INTRAVENOUS CONTINUOUS
Status: DISCONTINUED | OUTPATIENT
Start: 2022-12-20 | End: 2022-12-20 | Stop reason: HOSPADM

## 2022-12-20 RX ORDER — ACETAMINOPHEN 325 MG/1
975 TABLET ORAL ONCE
Status: DISCONTINUED | OUTPATIENT
Start: 2022-12-20 | End: 2022-12-20 | Stop reason: HOSPADM

## 2022-12-20 RX ORDER — SODIUM CHLORIDE, SODIUM LACTATE, POTASSIUM CHLORIDE, CALCIUM CHLORIDE 600; 310; 30; 20 MG/100ML; MG/100ML; MG/100ML; MG/100ML
INJECTION, SOLUTION INTRAVENOUS CONTINUOUS PRN
Status: DISCONTINUED | OUTPATIENT
Start: 2022-12-20 | End: 2022-12-20

## 2022-12-20 RX ORDER — IBUPROFEN 800 MG/1
800 TABLET, FILM COATED ORAL EVERY 6 HOURS PRN
Qty: 30 TABLET | Refills: 0 | COMMUNITY
Start: 2022-12-20

## 2022-12-20 RX ORDER — NALOXONE HYDROCHLORIDE 0.4 MG/ML
0.4 INJECTION, SOLUTION INTRAMUSCULAR; INTRAVENOUS; SUBCUTANEOUS
Status: DISCONTINUED | OUTPATIENT
Start: 2022-12-20 | End: 2022-12-20 | Stop reason: HOSPADM

## 2022-12-20 RX ORDER — IBUPROFEN 800 MG/1
800 TABLET, FILM COATED ORAL ONCE
Status: DISCONTINUED | OUTPATIENT
Start: 2022-12-20 | End: 2022-12-20 | Stop reason: HOSPADM

## 2022-12-20 RX ORDER — FENTANYL CITRATE 50 UG/ML
INJECTION, SOLUTION INTRAMUSCULAR; INTRAVENOUS PRN
Status: DISCONTINUED | OUTPATIENT
Start: 2022-12-20 | End: 2022-12-20

## 2022-12-20 RX ORDER — FENTANYL CITRATE 50 UG/ML
25-50 INJECTION, SOLUTION INTRAMUSCULAR; INTRAVENOUS
Status: DISCONTINUED | OUTPATIENT
Start: 2022-12-20 | End: 2022-12-20 | Stop reason: HOSPADM

## 2022-12-20 RX ORDER — OXYCODONE HYDROCHLORIDE 5 MG/1
5 TABLET ORAL
Status: COMPLETED | OUTPATIENT
Start: 2022-12-20 | End: 2022-12-20

## 2022-12-20 RX ORDER — HYDROMORPHONE HYDROCHLORIDE 1 MG/ML
0.2 INJECTION, SOLUTION INTRAMUSCULAR; INTRAVENOUS; SUBCUTANEOUS EVERY 5 MIN PRN
Status: DISCONTINUED | OUTPATIENT
Start: 2022-12-20 | End: 2022-12-20 | Stop reason: HOSPADM

## 2022-12-20 RX ORDER — ACETAMINOPHEN 325 MG/1
975 TABLET ORAL ONCE
Status: COMPLETED | OUTPATIENT
Start: 2022-12-20 | End: 2022-12-20

## 2022-12-20 RX ORDER — NALOXONE HYDROCHLORIDE 0.4 MG/ML
0.2 INJECTION, SOLUTION INTRAMUSCULAR; INTRAVENOUS; SUBCUTANEOUS
Status: DISCONTINUED | OUTPATIENT
Start: 2022-12-20 | End: 2022-12-20 | Stop reason: HOSPADM

## 2022-12-20 RX ORDER — KETOROLAC TROMETHAMINE 30 MG/ML
INJECTION, SOLUTION INTRAMUSCULAR; INTRAVENOUS PRN
Status: DISCONTINUED | OUTPATIENT
Start: 2022-12-20 | End: 2022-12-20

## 2022-12-20 RX ORDER — FLUMAZENIL 0.1 MG/ML
0.2 INJECTION, SOLUTION INTRAVENOUS
Status: DISCONTINUED | OUTPATIENT
Start: 2022-12-20 | End: 2022-12-20 | Stop reason: HOSPADM

## 2022-12-20 RX ORDER — DEXAMETHASONE SODIUM PHOSPHATE 10 MG/ML
INJECTION, SOLUTION INTRAMUSCULAR; INTRAVENOUS
Status: COMPLETED | OUTPATIENT
Start: 2022-12-20 | End: 2022-12-20

## 2022-12-20 RX ORDER — BUPIVACAINE HYDROCHLORIDE AND EPINEPHRINE 2.5; 5 MG/ML; UG/ML
INJECTION, SOLUTION INFILTRATION; PERINEURAL
Status: COMPLETED | OUTPATIENT
Start: 2022-12-20 | End: 2022-12-20

## 2022-12-20 RX ORDER — KETAMINE HYDROCHLORIDE 10 MG/ML
INJECTION INTRAMUSCULAR; INTRAVENOUS PRN
Status: DISCONTINUED | OUTPATIENT
Start: 2022-12-20 | End: 2022-12-20

## 2022-12-20 RX ORDER — OXYCODONE HYDROCHLORIDE 5 MG/1
5 TABLET ORAL EVERY 6 HOURS PRN
Qty: 6 TABLET | Refills: 0 | Status: SHIPPED | OUTPATIENT
Start: 2022-12-20 | End: 2022-12-23

## 2022-12-20 RX ORDER — CEFAZOLIN SODIUM/WATER 2 G/20 ML
2 SYRINGE (ML) INTRAVENOUS
Status: COMPLETED | OUTPATIENT
Start: 2022-12-20 | End: 2022-12-20

## 2022-12-20 RX ORDER — ONDANSETRON 2 MG/ML
INJECTION INTRAMUSCULAR; INTRAVENOUS PRN
Status: DISCONTINUED | OUTPATIENT
Start: 2022-12-20 | End: 2022-12-20

## 2022-12-20 RX ORDER — DEXAMETHASONE SODIUM PHOSPHATE 4 MG/ML
INJECTION, SOLUTION INTRA-ARTICULAR; INTRALESIONAL; INTRAMUSCULAR; INTRAVENOUS; SOFT TISSUE PRN
Status: DISCONTINUED | OUTPATIENT
Start: 2022-12-20 | End: 2022-12-20

## 2022-12-20 RX ORDER — ONDANSETRON 2 MG/ML
4 INJECTION INTRAMUSCULAR; INTRAVENOUS EVERY 30 MIN PRN
Status: DISCONTINUED | OUTPATIENT
Start: 2022-12-20 | End: 2022-12-20 | Stop reason: HOSPADM

## 2022-12-20 RX ORDER — AMOXICILLIN 250 MG
1-2 CAPSULE ORAL 2 TIMES DAILY
Qty: 30 TABLET | Refills: 0 | COMMUNITY
Start: 2022-12-20 | End: 2024-09-16

## 2022-12-20 RX ORDER — PROPOFOL 10 MG/ML
INJECTION, EMULSION INTRAVENOUS CONTINUOUS PRN
Status: DISCONTINUED | OUTPATIENT
Start: 2022-12-20 | End: 2022-12-20

## 2022-12-20 RX ORDER — ONDANSETRON 4 MG/1
4 TABLET, ORALLY DISINTEGRATING ORAL EVERY 30 MIN PRN
Status: DISCONTINUED | OUTPATIENT
Start: 2022-12-20 | End: 2022-12-20 | Stop reason: HOSPADM

## 2022-12-20 RX ORDER — FENTANYL CITRATE 50 UG/ML
50 INJECTION, SOLUTION INTRAMUSCULAR; INTRAVENOUS EVERY 5 MIN PRN
Status: DISCONTINUED | OUTPATIENT
Start: 2022-12-20 | End: 2022-12-20 | Stop reason: HOSPADM

## 2022-12-20 RX ORDER — LIDOCAINE HYDROCHLORIDE 20 MG/ML
INJECTION, SOLUTION INFILTRATION; PERINEURAL PRN
Status: DISCONTINUED | OUTPATIENT
Start: 2022-12-20 | End: 2022-12-20

## 2022-12-20 RX ORDER — LIDOCAINE 40 MG/G
CREAM TOPICAL
Status: DISCONTINUED | OUTPATIENT
Start: 2022-12-20 | End: 2022-12-20 | Stop reason: HOSPADM

## 2022-12-20 RX ORDER — CEFAZOLIN SODIUM/WATER 2 G/20 ML
2 SYRINGE (ML) INTRAVENOUS SEE ADMIN INSTRUCTIONS
Status: DISCONTINUED | OUTPATIENT
Start: 2022-12-20 | End: 2022-12-20 | Stop reason: HOSPADM

## 2022-12-20 RX ORDER — ACETAMINOPHEN 325 MG/1
975 TABLET ORAL EVERY 6 HOURS PRN
Qty: 50 TABLET | Refills: 0 | Status: SHIPPED | OUTPATIENT
Start: 2022-12-20

## 2022-12-20 RX ORDER — PROPOFOL 10 MG/ML
INJECTION, EMULSION INTRAVENOUS PRN
Status: DISCONTINUED | OUTPATIENT
Start: 2022-12-20 | End: 2022-12-20

## 2022-12-20 RX ORDER — HYDROMORPHONE HYDROCHLORIDE 1 MG/ML
0.4 INJECTION, SOLUTION INTRAMUSCULAR; INTRAVENOUS; SUBCUTANEOUS EVERY 5 MIN PRN
Status: DISCONTINUED | OUTPATIENT
Start: 2022-12-20 | End: 2022-12-20 | Stop reason: HOSPADM

## 2022-12-20 RX ORDER — DEXMEDETOMIDINE HYDROCHLORIDE 4 UG/ML
INJECTION, SOLUTION INTRAVENOUS
Status: COMPLETED | OUTPATIENT
Start: 2022-12-20 | End: 2022-12-20

## 2022-12-20 RX ORDER — FENTANYL CITRATE 50 UG/ML
25 INJECTION, SOLUTION INTRAMUSCULAR; INTRAVENOUS EVERY 5 MIN PRN
Status: DISCONTINUED | OUTPATIENT
Start: 2022-12-20 | End: 2022-12-20 | Stop reason: HOSPADM

## 2022-12-20 RX ORDER — PHENAZOPYRIDINE HYDROCHLORIDE 200 MG/1
200 TABLET, FILM COATED ORAL ONCE
Status: COMPLETED | OUTPATIENT
Start: 2022-12-20 | End: 2022-12-20

## 2022-12-20 RX ADMIN — PHENAZOPYRIDINE HYDROCHLORIDE 200 MG: 200 TABLET, FILM COATED ORAL at 12:16

## 2022-12-20 RX ADMIN — FENTANYL CITRATE 50 MCG: 50 INJECTION, SOLUTION INTRAMUSCULAR; INTRAVENOUS at 14:55

## 2022-12-20 RX ADMIN — OXYCODONE HYDROCHLORIDE 5 MG: 5 TABLET ORAL at 17:01

## 2022-12-20 RX ADMIN — Medication 50 MG: at 13:25

## 2022-12-20 RX ADMIN — Medication 20 MG: at 13:56

## 2022-12-20 RX ADMIN — BUPIVACAINE HYDROCHLORIDE AND EPINEPHRINE BITARTRATE 60 ML: 2.5; .005 INJECTION, SOLUTION INFILTRATION; PERINEURAL at 13:47

## 2022-12-20 RX ADMIN — KETOROLAC TROMETHAMINE 30 MG: 30 INJECTION, SOLUTION INTRAMUSCULAR at 15:25

## 2022-12-20 RX ADMIN — FENTANYL CITRATE 50 MCG: 50 INJECTION, SOLUTION INTRAMUSCULAR; INTRAVENOUS at 13:23

## 2022-12-20 RX ADMIN — SODIUM CHLORIDE, POTASSIUM CHLORIDE, SODIUM LACTATE AND CALCIUM CHLORIDE: 600; 310; 30; 20 INJECTION, SOLUTION INTRAVENOUS at 11:37

## 2022-12-20 RX ADMIN — SODIUM CHLORIDE, POTASSIUM CHLORIDE, SODIUM LACTATE AND CALCIUM CHLORIDE: 600; 310; 30; 20 INJECTION, SOLUTION INTRAVENOUS at 14:37

## 2022-12-20 RX ADMIN — PROPOFOL 50 MCG/KG/MIN: 10 INJECTION, EMULSION INTRAVENOUS at 13:30

## 2022-12-20 RX ADMIN — Medication 20 MG: at 13:37

## 2022-12-20 RX ADMIN — Medication 10 MG: at 15:16

## 2022-12-20 RX ADMIN — SUGAMMADEX 200 MG: 100 INJECTION, SOLUTION INTRAVENOUS at 15:47

## 2022-12-20 RX ADMIN — Medication 10 MG: at 14:37

## 2022-12-20 RX ADMIN — Medication 20 MG: at 14:55

## 2022-12-20 RX ADMIN — MIDAZOLAM 2 MG: 1 INJECTION INTRAMUSCULAR; INTRAVENOUS at 13:05

## 2022-12-20 RX ADMIN — DEXAMETHASONE SODIUM PHOSPHATE 10 MG: 4 INJECTION, SOLUTION INTRA-ARTICULAR; INTRALESIONAL; INTRAMUSCULAR; INTRAVENOUS; SOFT TISSUE at 13:25

## 2022-12-20 RX ADMIN — Medication 2 G: at 13:28

## 2022-12-20 RX ADMIN — SODIUM CHLORIDE, POTASSIUM CHLORIDE, SODIUM LACTATE AND CALCIUM CHLORIDE: 600; 310; 30; 20 INJECTION, SOLUTION INTRAVENOUS at 13:05

## 2022-12-20 RX ADMIN — HYDROMORPHONE HYDROCHLORIDE 0.2 MG: 1 INJECTION, SOLUTION INTRAMUSCULAR; INTRAVENOUS; SUBCUTANEOUS at 16:30

## 2022-12-20 RX ADMIN — ACETAMINOPHEN 975 MG: 325 TABLET, FILM COATED ORAL at 12:15

## 2022-12-20 RX ADMIN — PROPOFOL 200 MG: 10 INJECTION, EMULSION INTRAVENOUS at 13:24

## 2022-12-20 RX ADMIN — DEXMEDETOMIDINE 40 MCG: 100 INJECTION, SOLUTION, CONCENTRATE INTRAVENOUS at 13:47

## 2022-12-20 RX ADMIN — DEXAMETHASONE SODIUM PHOSPHATE 2 MG: 10 INJECTION, SOLUTION INTRAMUSCULAR; INTRAVENOUS at 13:47

## 2022-12-20 RX ADMIN — ONDANSETRON 4 MG: 2 INJECTION INTRAMUSCULAR; INTRAVENOUS at 15:25

## 2022-12-20 RX ADMIN — LIDOCAINE HYDROCHLORIDE 80 MG: 20 INJECTION, SOLUTION INFILTRATION; PERINEURAL at 13:24

## 2022-12-20 RX ADMIN — PROPOFOL 50 MG: 10 INJECTION, EMULSION INTRAVENOUS at 13:27

## 2022-12-20 ASSESSMENT — ACTIVITIES OF DAILY LIVING (ADL)
ADLS_ACUITY_SCORE: 35

## 2022-12-20 NOTE — ANESTHESIA PREPROCEDURE EVALUATION
Anesthesia Pre-Procedure Evaluation    Patient: Luis Gutierrez   MRN: [id number] : 1980        Procedure : Procedure(s):  HYSTERECTOMY, SUPRACERVICAL, LAPAROSCOPIC, WITH SALPINGO-OOPHORECTOMY          Past Medical History:   Diagnosis Date     Hypertension 5/10/12    Preeclampsia      Past Surgical History:   Procedure Laterality Date      SECTION  2012     MASTECTOMY SIMPLE BILATERAL Bilateral 2022    Procedure: MASTECTOMY, BILATERAL, SIMPLE, WITH NIPPLE GRAFTS, ONQ;  Surgeon: Fiona Lama MD;  Location: [facility] OR      No Known Allergies   Social History     Tobacco Use     Smoking status: Former     Packs/day: 0.15     Years: 20.00     Pack years: 3.00     Types: Cigarettes     Start date: 2000     Quit date: 2020     Years since quittin.0     Smokeless tobacco: Never   Substance Use Topics     Alcohol use: Yes      Wt Readings from Last 1 Encounters:   22 77.4 kg (170 lb 9.6 oz)        Anesthesia Evaluation   Pt has had prior anesthetic. Type: General.        ROS/MED HX  ENT/Pulmonary:  - neg pulmonary ROS     Neurologic:  - neg neurologic ROS     Cardiovascular:     (+) hypertension-----    METS/Exercise Tolerance:     Hematologic:  - neg hematologic  ROS     Musculoskeletal:  - neg musculoskeletal ROS     GI/Hepatic:  - neg GI/hepatic ROS     Renal/Genitourinary:  - neg Renal ROS     Endo:  - neg endo ROS     Psychiatric/Substance Use:       Infectious Disease:  - neg infectious disease ROS     Malignancy:  - neg malignancy ROS     Other:  - neg other ROS          Physical Exam    Airway        Mallampati: II   TM distance: > 3 FB   Neck ROM: full   Mouth opening: > 3 cm    Respiratory Devices and Support         Dental  no notable dental history         Cardiovascular   cardiovascular exam normal          Pulmonary   pulmonary exam normal                OUTSIDE LABS:  CBC:   Lab Results   Component Value Date    WBC 7.2 02/15/2021    HGB 16.1 2022    HGB  14.2 04/19/2021    HCT 43.1 04/19/2021    HCT 43.0 02/15/2021     02/15/2021     BMP:   Lab Results   Component Value Date     12/06/2022     10/11/2021    POTASSIUM 3.9 12/06/2022    POTASSIUM 3.6 10/11/2021    CHLORIDE 102 12/06/2022    CHLORIDE 107 10/11/2021    CO2 25 12/06/2022    CO2 26 10/11/2021    BUN 14.4 12/06/2022    BUN 11 10/11/2021    CR 0.92 12/06/2022    CR 1.02 10/11/2021    GLC 92 12/06/2022    GLC 86 10/11/2021     COAGS: No results found for: PTT, INR, FIBR  POC:   Lab Results   Component Value Date    HCG Negative 02/18/2022     HEPATIC:   Lab Results   Component Value Date    ALBUMIN 3.3 (L) 04/19/2021    PROTTOTAL 6.4 (L) 04/19/2021    ALT 21 04/19/2021    AST 18 04/19/2021    ALKPHOS 50 04/19/2021    BILITOTAL 0.3 04/19/2021     OTHER:   Lab Results   Component Value Date    JAIMIE 9.2 12/06/2022       Anesthesia Plan    ASA Status:  2   NPO Status:  NPO Appropriate    Anesthesia Type: General.     - Airway: ETT   Induction: Intravenous.   Maintenance: Balanced.   Techniques and Equipment:     - Lines/Monitors: 2nd IV     Consents         - Extended Intubation/Ventilatory Support Discussed: No.      - Patient is DNR/DNI Status: No    Use of blood products discussed: No .     Postoperative Care    Pain management: Multi-modal analgesia.   PONV prophylaxis: Ondansetron (or other 5HT-3)     Comments:    Other Comments: GETTA with std monitors            Jelly Goodson MD

## 2022-12-20 NOTE — BRIEF OP NOTE
[facility]  Brief Operative Note     Surgery Date: 12/20/2022    Surgeon:  Heide Mock MD    Assistants:  Magnolia Garcia PGY4    Pre-op Diagnosis:  1. Gender affirming surgery     2. Dysmenorrhea    Post-op Diagnosis:  1. Same, s/p procedure    Procedure:  Laparoscopic supracervical hysterectomy, bilateral salpingo-oophorectomy, cystoscopy    Anesthesia: GETA with TAP    EBL:  75 mL    IVF:  1200 mL crystalloid    UOP:  400 mL clear yellow urine     Drains: S/p Macias Catheter     Specimens:  Uterus, bilateral fallopian tubes and ovaries    Complications:  None apparent    Findings:   Small mobile uterus on bimanual exam. No injury upon entry, normal upper abdominal survey. Normal appearing bilateral fallopian tubes, ovaries and uterus. Hemostasis at conclusion. On cystoscopy, two ureters identified with brisk efflux, normal appearing bladder    Disposition:  Transferred in stable condition to PACU    Magnolia Garcia MD PGY4  Obstetrics & Gynecology  12/20/22

## 2022-12-20 NOTE — ANESTHESIA CARE TRANSFER NOTE
Patient: Luis Gutierrez    Procedure: Procedure(s):  HYSTERECTOMY, SUPRACERVICAL, LAPAROSCOPIC, WITH SALPINGO-OOPHORECTOMY  Cystoscopy       Diagnosis: Gender dysphoria [F64.9]  Menorrhagia with regular cycle [N92.0]  Diagnosis Additional Information: No value filed.    Anesthesia Type:   General     Note:    Oropharynx: oropharynx clear of all foreign objects and spontaneously breathing  Level of Consciousness: drowsy and awake  Oxygen Supplementation: face mask  Level of Supplemental Oxygen (L/min / FiO2): 6  Independent Airway: airway patency satisfactory and stable  Dentition: dentition unchanged  Vital Signs Stable: post-procedure vital signs reviewed and stable  Report to RN Given: handoff report given  Patient transferred to: PACU    Handoff Report: Identifed the Patient, Identified the Reponsible Provider, Reviewed the pertinent medical history, Discussed the surgical course, Reviewed Intra-OP anesthesia mangement and issues during anesthesia, Set expectations for post-procedure period and Allowed opportunity for questions and acknowledgement of understanding      Vitals:  Vitals Value Taken Time   /81 12/20/22 1604   Temp 36.1    Pulse 77 12/20/22 1604   Resp 16 12/20/22 1606   SpO2 100 % 12/20/22 1606   Vitals shown include unvalidated device data.    Electronically Signed By: ERMELINDA Alvarez CRNA  December 20, 2022  4:06 PM

## 2022-12-20 NOTE — ANESTHESIA PROCEDURE NOTES
TAP Procedure Note    Pre-Procedure   Staff -        Anesthesiologist:  Dilip Pimentel MD       Resident/Fellow: Min Verdin DO       Performed By: fellow       Location: OR       Procedure Start/Stop Times: 12/20/2022 1:47 PM and 12/20/2022 1:54 PM       Pre-Anesthestic Checklist: patient identified, IV checked, site marked, risks and benefits discussed, informed consent, monitors and equipment checked, pre-op evaluation, at physician/surgeon's request and post-op pain management  Timeout:       Correct Patient: Yes        Correct Procedure: Yes        Correct Site: Yes        Correct Position: Yes        Correct Laterality: Yes        Site Marked: Yes  Procedure Documentation  Procedure: TAP       Diagnosis: POST OP PAIN       Laterality: bilateral       Patient Position: supine       Patient Prep/Sterile Barriers: sterile gloves, mask       Skin prep: Chloraprep       Needle Type: short bevel       Needle Gauge: 21.        Needle Length (millimeters): 100        Ultrasound guided       1. Ultrasound was used to identify targeted nerve, plexus, vascular marker, or fascial plane and place a needle adjacent to it in real-time.       2. Ultrasound was used to visualize the spread of anesthetic in close proximity to the above referenced structure.       3. A permanent image is entered into the patient's record.    Assessment/Narrative         The placement was negative for: blood aspirated and site bleeding       Bolus given via needle..        Secured via.        Insertion/Infusion Method: Single Shot       Complications: none    Medication(s) Administered   Bupivacaine 0.25% w/ 1:200K Epi (Injection) - Injection   60 mL - 12/20/2022 1:47:00 PM  Dexmedetomidine 4 mcg/mL (Perineural) - Perineural   40 mcg - 12/20/2022 1:47:00 PM  Dexamethasone 10 mg/mL PF (Perineural) - Perineural   2 mg - 12/20/2022 1:47:00 PM  Medication Administration Time: 12/20/2022 1:47 PM     Comments:  Bilateral Transverse  "Abdominis Plane Block       FOR [facility] ([facility] ONLY:   Pain Team Contact information: please page the Pain Team Via [facility]. Search \"Pain\". During daytime hours, please page the attending first. At night please page the resident first.    "

## 2022-12-20 NOTE — ANESTHESIA POSTPROCEDURE EVALUATION
"Patient: Luis Gutierrez    Procedure: Procedure(s):  HYSTERECTOMY, SUPRACERVICAL, LAPAROSCOPIC, WITH SALPINGO-OOPHORECTOMY  Cystoscopy       Anesthesia Type:  General    Note:  Disposition: Outpatient   Postop Pain Control: Uneventful            Sign Out: Well controlled pain   PONV: No   Neuro/Psych: Uneventful            Sign Out: Acceptable/Baseline neuro status   Airway/Respiratory: Uneventful            Sign Out: Acceptable/Baseline resp. status   CV/Hemodynamics: Uneventful            Sign Out: Acceptable CV status; No obvious hypovolemia; No obvious fluid overload   Other NRE: NONE   DID A NON-ROUTINE EVENT OCCUR? No           Last vitals:  Vitals Value Taken Time   /83 12/20/22 1645   Temp 36.1  C (97  F) 12/20/22 1604   Pulse 72 12/20/22 1658   Resp 24 12/20/22 1658   SpO2 99 % 12/20/22 1658   Vitals shown include unvalidated device data.    Patient Vitals for the past 24 hrs:   BP Temp Temp src Pulse Resp SpO2 Height Weight   12/20/22 1700 -- 36.8  C (98.2  F) Axillary -- -- -- -- --   12/20/22 1645 123/83 -- -- 60 16 100 % -- --   12/20/22 1630 121/82 -- -- 64 11 98 % -- --   12/20/22 1615 133/88 -- -- 64 18 100 % -- --   12/20/22 1604 127/81 36.1  C (97  F) Axillary 77 12 100 % -- --   12/20/22 1000 117/75 36.5  C (97.7  F) Oral 74 14 98 % 1.607 m (5' 3.27\") 76.9 kg (169 lb 8.5 oz)         Electronically Signed By: Carlie Porras MD  December 20, 2022  5:03 PM  "

## 2022-12-20 NOTE — DISCHARGE INSTRUCTIONS

## 2022-12-20 NOTE — ANESTHESIA PROCEDURE NOTES
Airway       Patient location during procedure: OR       Procedure Start/Stop Times: 12/20/2022 1:28 PM  Staff -        Anesthesiologist:  Jelly Jhaveri MD       CRNA: Wayne Birmingham APRN CRNA       Other Anesthesia Staff: Heide Gonzales       Performed By: anesthesiologist, CRNA and SRNA  Consent for Airway        Urgency: elective  Indications and Patient Condition       Indications for airway management: dannie-procedural         Mask difficulty assessment: 1 - vent by mask    Final Airway Details       Final airway type: endotracheal airway       Successful airway: ETT - single and Oral  Endotracheal Airway Details        ETT size (mm): 7.0       Cuffed: yes       Successful intubation technique: direct laryngoscopy       DL Blade Type: MAC 3       Grade View of Cords: 2 (grade 2A)       Adjucts: stylet       Position: Right       Measured from: lips       Secured at (cm): 22       Bite block used: None    Post intubation assessment        Placement verified by: capnometry, equal breath sounds and chest rise        Number of attempts at approach: 1       Number of other approaches attempted: 0       Secured with: silk tape       Ease of procedure: easy       Dentition: Intact and Unchanged    Medication(s) Administered   Medication Administration Time: 12/20/2022 1:28 PM

## 2022-12-21 NOTE — OP NOTE
[facility]  Operative Note     Surgery Date:   2022     Surgeon:           Heide Mock MD     Assistants:       Magnolia Garcia PGY4     Pre-op Diagnosis:         1. Gender affirming surgery                                         2. Dysmenorrhea     Post-op Diagnosis:       1. Same, s/p procedure     Procedure:        Laparoscopic supracervical hysterectomy, bilateral salpingo-oophorectomy, cystoscopy     Anesthesia:      GETA with TAP block     EBL:     75 mL     IVF:       1200 mL crystalloid     UOP:    400 mL clear yellow urine      Drains: S/p Macias Catheter      Specimens:      Uterus, bilateral fallopian tubes and ovaries     Complications:             None apparent    Indications: Luis Gutierrez (they/them) is a 41yo  who presented for gender affirming hysterectomy and menorrhagia/dysmenorrhea. They are s/p top surgery and on testosterone. They desired to keep their cervix, and remove their ovaries and fallopian tubes. Laparoscopic supracervical hysterectomy with bilateral salpingo-oophorectomy was recommended. PMH includes history of 1  section and duplication of left ureter. Risks/benefits alternatives discussed and they agreed to proceed.      Findings:   Small mobile uterus on bimanual exam. No injury upon entry, normal upper abdominal survey. Normal appearing bilateral fallopian tubes, ovaries and uterus. Minimal filmy adhesions of bladder peritoneum to lower uterine segment. Hemostasis at conclusion. On cystoscopy, two ureters identified with brisk efflux, normal appearing bladder    Procedure:   Consent was reviewed with the patient in the preoperative setting and confirmed. She received prophylactic antibiotics. The patient was transferred to the operating room where SCDs were placed. General anesthesia was obtained without noted difficulties and patient received TAP block. They were positioned in dorsal lithotomy position in what was felt to be a  neurologically safe position with both arms tucked at sides. Exam under anesthesia was performed with findings as described above. The patient was then prepped and draped in the usual sterile fashion.      Timeout was called at which point the patient's name, procedure and operative site were confirmed by the operative team. A speculum was placed in the vagina. The anterior lip of the cervix was grasped with tenaculum and a medium Vcare manipulator was placed. The speculum was removed and a silverio catheter placed.      Attention was then turned to the abdomen after gloves were changed. A 3cm curvilinear supra-umbilical incision was made superior to the umbilicus. Fascia was grasped, elevated and incised with scalpel. Peritoneum was grasped x2 and entered sharply. Fascia was extended with Fleming scissors and tagged with 0-Vicryl. The mini gel port was placed with 5mm laparoscopic trochar and 8mm assistant trochar, and the abdomen was insufflated.      The laparoscopic camera was then inserted and the abdomen evaluated with no evidence of intraabdominal trauma from entry noted. All additional ports were placed under direct visualization without any injuries noted. Two additional 5 mm laparoscopic ports were placed in the left and right lower quadrants. The LLQ was a 5mm Airseal port. The Airseal was attached. At this point, the patient was placed into steep Trendelenburg. The pelvis was inspected as well as the upper abdomen with findings as noted above.    Attention was then turned to the left fallopian tube. The mesosalpinx was grasped, cauterized and incised with Ligasure until removed. The left IP ligament was then divided. The left round ligament was grasped and transected using bipolar cautery. The IP incision and round ligament incision were then connected. The remaining portions of the anterior and posterior leaves of the broad ligament were then opened and carefully dissected down to the level of the uterine  artery. The bladder flap was partially developed. The uterine vessels were cauterized and divided. This dissection was continued medially to develop the bladder flap.     Attention was turned towards the right. The right IP ligament was cauterized and divided. The right mesosalpinx was grasped, cauterized and incised until right fallopian tube was removed. The right round ligament was grasped and transected. The remaining portions of the anterior and posterior leaves of the broad ligament were then opened and carefully dissected down to the level of the uterine artery. Bladder flap was fully developed. Right uterine arteries were skeletonized, isolated and cauterized and transected.      The bladder was gently dissected off the uterus and cervix until it was clear of the planned area of incision.  Blanching of the uterus was noted. The Hilda Loop was inserted and placed around uterus with assistance of grasper and placed at level of internal cervical os. The uterine manipulator was then removed. The Hilda Loop was then activated on cut and the uterus was amputated. The uterus was then placed into 10mm endocatch bag.      Bleeding vessel was noted from left uterine vessels, hemostatic with Ligasure. Hemostasis of cervical stump was achieved with monopolar scissors using cautery. The pelvis was irrigated and hemostasis noted. Gel port was removed and the uterus was then hand morcellated within the bag using 10 blade scalpel. All instruments were then removed from the abdomen.     The pneumoperitoneum was expelled. The patient was leveled and given positive pressure breaths. The supraumbilical incision was closed with running 0-Vicryl. Skin was reapproximated with 4-0 monocryl suture, and exophin applied.      Sponge, instrument and needle counts were correct x 2. The patient tolerated the procedure well and was taken to PACU in stable condition. Dr. Mock was present for delaney portions of the procedure.    Magnolia  MD Jose PGY4  Obstetrics & Gynecology  12/20/22     I was present and scrubbed throughout the procedure,  I agree with the note above  Heide Mock MD

## 2022-12-23 ENCOUNTER — TELEPHONE (OUTPATIENT)
Dept: OBGYN | Facility: CLINIC | Age: 42
End: 2022-12-23

## 2022-12-23 PROCEDURE — 88307 TISSUE EXAM BY PATHOLOGIST: CPT | Mod: 26 | Performed by: PATHOLOGY

## 2022-12-23 NOTE — TELEPHONE ENCOUNTER
I spoke with Luis regarding the normal pathology report.  They are doing well, worst pain occurs with sneezes.  No issues with voiding or BMs.  Has post op scheduled.  Heide Mock MD

## 2023-01-09 ENCOUNTER — MYC MEDICAL ADVICE (OUTPATIENT)
Dept: OBGYN | Facility: CLINIC | Age: 43
End: 2023-01-09

## 2023-02-10 ENCOUNTER — LAB (OUTPATIENT)
Dept: LAB | Facility: CLINIC | Age: 43
End: 2023-02-10
Payer: COMMERCIAL

## 2023-02-10 DIAGNOSIS — F64.9 GENDER DYSPHORIA: ICD-10-CM

## 2023-02-10 DIAGNOSIS — R31.9 HEMATURIA, UNSPECIFIED TYPE: ICD-10-CM

## 2023-02-10 LAB
ALBUMIN UR-MCNC: 30 MG/DL
ALT SERPL W P-5'-P-CCNC: 14 U/L (ref 10–50)
APPEARANCE UR: CLEAR
AST SERPL W P-5'-P-CCNC: 17 U/L (ref 10–50)
BILIRUB UR QL STRIP: NEGATIVE
CHOLEST SERPL-MCNC: 269 MG/DL
COLOR UR AUTO: ABNORMAL
GLUCOSE UR STRIP-MCNC: NEGATIVE MG/DL
HDLC SERPL-MCNC: 60 MG/DL
HGB UR QL STRIP: ABNORMAL
KETONES UR STRIP-MCNC: NEGATIVE MG/DL
LDLC SERPL CALC-MCNC: 195 MG/DL
LEUKOCYTE ESTERASE UR QL STRIP: NEGATIVE
NITRATE UR QL: NEGATIVE
NONHDLC SERPL-MCNC: 209 MG/DL
PH UR STRIP: 7 [PH] (ref 5–7)
RBC URINE: 7 /HPF
SP GR UR STRIP: 1 (ref 1–1.03)
TRIGL SERPL-MCNC: 68 MG/DL
UROBILINOGEN UR STRIP-MCNC: NORMAL MG/DL
WBC URINE: 1 /HPF

## 2023-02-10 PROCEDURE — 84450 TRANSFERASE (AST) (SGOT): CPT | Performed by: PATHOLOGY

## 2023-02-10 PROCEDURE — 84403 ASSAY OF TOTAL TESTOSTERONE: CPT | Performed by: STUDENT IN AN ORGANIZED HEALTH CARE EDUCATION/TRAINING PROGRAM

## 2023-02-10 PROCEDURE — 36415 COLL VENOUS BLD VENIPUNCTURE: CPT | Performed by: PATHOLOGY

## 2023-02-10 PROCEDURE — 80061 LIPID PANEL: CPT | Performed by: PATHOLOGY

## 2023-02-10 PROCEDURE — 84460 ALANINE AMINO (ALT) (SGPT): CPT | Performed by: PATHOLOGY

## 2023-02-10 PROCEDURE — 81001 URINALYSIS AUTO W/SCOPE: CPT | Performed by: PATHOLOGY

## 2023-02-11 LAB — TESTOST SERPL-MCNC: 599 NG/DL (ref 8–950)

## 2023-04-13 ENCOUNTER — ANCILLARY PROCEDURE (OUTPATIENT)
Dept: GENERAL RADIOLOGY | Facility: CLINIC | Age: 43
End: 2023-04-13
Attending: FAMILY MEDICINE
Payer: COMMERCIAL

## 2023-04-13 ENCOUNTER — OFFICE VISIT (OUTPATIENT)
Dept: FAMILY MEDICINE | Facility: CLINIC | Age: 43
End: 2023-04-13
Payer: COMMERCIAL

## 2023-04-13 VITALS
HEART RATE: 63 BPM | HEIGHT: 62 IN | DIASTOLIC BLOOD PRESSURE: 78 MMHG | WEIGHT: 172 LBS | SYSTOLIC BLOOD PRESSURE: 124 MMHG | OXYGEN SATURATION: 98 % | BODY MASS INDEX: 31.65 KG/M2 | TEMPERATURE: 97.7 F | RESPIRATION RATE: 16 BRPM

## 2023-04-13 DIAGNOSIS — Z23 ENCOUNTER FOR IMMUNIZATION: ICD-10-CM

## 2023-04-13 DIAGNOSIS — M79.672 PAIN OF LEFT HEEL: Primary | ICD-10-CM

## 2023-04-13 DIAGNOSIS — M79.672 PAIN OF LEFT HEEL: ICD-10-CM

## 2023-04-13 PROCEDURE — 99213 OFFICE O/P EST LOW 20 MIN: CPT | Mod: 25 | Performed by: FAMILY MEDICINE

## 2023-04-13 PROCEDURE — 90746 HEPB VACCINE 3 DOSE ADULT IM: CPT | Performed by: FAMILY MEDICINE

## 2023-04-13 PROCEDURE — 73630 X-RAY EXAM OF FOOT: CPT | Mod: LT | Performed by: RADIOLOGY

## 2023-04-13 PROCEDURE — 90471 IMMUNIZATION ADMIN: CPT | Performed by: FAMILY MEDICINE

## 2023-04-13 ASSESSMENT — ENCOUNTER SYMPTOMS
VOMITING: 0
FEVER: 0
CHILLS: 0
FREQUENCY: 0
CHEST TIGHTNESS: 0
SHORTNESS OF BREATH: 0
NAUSEA: 0
SORE THROAT: 0
ABDOMINAL PAIN: 0
DIZZINESS: 0
HEADACHES: 0
DYSURIA: 0

## 2023-04-13 ASSESSMENT — PAIN SCALES - GENERAL: PAINLEVEL: SEVERE PAIN (7)

## 2023-04-13 NOTE — PROGRESS NOTES
Assessment & Plan     Pain of left heel  Patient endorses persistent pain on bottom of left heel, near arch since February. Exam significant for pain with palpation on bottom of L foot near front of heel pad. Otherwise unremarkable exam. Differential diagnosis includes calcaneal stress fracture vs planter fasciitis. Given patient has been trying conservative management for the past 2 months, would recommend XR to rule out stress fracture. Will refer to sports medicine for further evaluation after XR.   - X-ray lt Foot G/E 3 vws*  - Sports med referral    No follow-ups on file.    Mariana Virk, MS3    I was present with the medical student who participated in the service and in the documentation of this note. I have verified the history and personally performed the physical exam and medical decision making, and have verified the content of the note, which accurately reflects my assessment of the patient and the plan of care.  Xray - Reviewed and interpreted by me.  No evidence of fracture.    Renee Wiggins [facility] DEBBY Smith is a 42 year old, presenting for the following health issues:  Musculoskeletal Problem (Left foot pain, in pain for couple of months, pain scale 7 ) and Immunization (Hep B)        12/6/2022     3:48 PM   Additional Questions   Roomed by Nelda Trevizo MA   Accompanied by Theyself     HPI     42 year old with no pertinent medical history who presents to clinic today with a foot injury.     Left foot pain  -Hurts when standing/walking and when resting  - Front of heal near arch  - Feels achy all the time  - On and off since February - sometimes day where it doesn't bother them  - On feet all day at work as adame   - Was off of work for 2 months after hysterectomy and noticed when went back to work   - Walking 5-7 miles/day at work   - Wears work boots  - Has tried stretching, massage, ice, NSAIDs  - only temporary relief  - Saw chiropractor who is  "concerned about stress fracture  - Has had prior episodes of this many years ago, didn't last long and resolved on its own      No new meds since last visit here  Laparoscopic hysterectomy and bilateral salpingo-oophorectomy in 12/2022    Review of Systems   Constitutional: Negative for chills and fever.   HENT: Negative for sneezing and sore throat.    Respiratory: Negative for chest tightness and shortness of breath.    Cardiovascular: Negative for chest pain.   Gastrointestinal: Negative for abdominal pain, nausea and vomiting.   Genitourinary: Negative for dysuria and frequency.   Skin: Negative for rash.   Neurological: Negative for dizziness and headaches.          Objective    /78   Pulse 63   Temp 97.7  F (36.5  C) (Oral)   Resp 16   Ht 1.575 m (5' 2\")   Wt 78 kg (172 lb)   LMP 12/01/2022 (Approximate)   SpO2 98%   BMI 31.46 kg/m    Body mass index is 31.46 kg/m .  Physical Exam   GENERAL: healthy, alert and no distress  EYES: Eyes grossly normal to inspection, conjunctivae and sclerae normal  HENT: normocephalic, atraumatic  RESP: breathing comfortably on room air  CV: appears well perfused. Lower extremity pulses intact and equal bilaterally  MS: Pain with palpation on bottom of foot near front of heel pad towards the arch of the foot. Normal active and passive ROM in L ankle. Bilaterally equal strength with ankle flexion, extension. Dorsalis pedis pulse intact and equal bilaterally. No pain with palpation of bony prominences. No calcaneal, forefoot or midfoot pain with palpation.   SKIN: no obvious lesions or rashes  NEURO: Normal strength and tone, mentation intact and speech normal  PSYCH: mentation appears normal, affect normal/bright    Xray - Reviewed and interpreted by me.  No evidence of fracture.                 "

## 2023-04-14 ENCOUNTER — MYC MEDICAL ADVICE (OUTPATIENT)
Dept: FAMILY MEDICINE | Facility: CLINIC | Age: 43
End: 2023-04-14
Payer: COMMERCIAL

## 2023-04-14 DIAGNOSIS — M79.672 PAIN OF LEFT HEEL: Primary | ICD-10-CM

## 2023-04-17 ENCOUNTER — MYC MEDICAL ADVICE (OUTPATIENT)
Dept: FAMILY MEDICINE | Facility: CLINIC | Age: 43
End: 2023-04-17
Payer: COMMERCIAL

## 2023-04-17 DIAGNOSIS — R31.29 MICROSCOPIC HEMATURIA: Primary | ICD-10-CM

## 2023-04-17 NOTE — TELEPHONE ENCOUNTER
Pt with persistent microscopic hematuria on repeat UA despite pelvic rest and adequate healing time from hysterectomy. Refer to Urology to discuss further workup.

## 2023-04-19 ENCOUNTER — TELEPHONE (OUTPATIENT)
Dept: UROLOGY | Facility: CLINIC | Age: 43
End: 2023-04-19
Payer: COMMERCIAL

## 2023-05-17 ENCOUNTER — VIRTUAL VISIT (OUTPATIENT)
Dept: UROLOGY | Facility: CLINIC | Age: 43
End: 2023-05-17
Payer: COMMERCIAL

## 2023-05-17 VITALS — WEIGHT: 160 LBS | BODY MASS INDEX: 29.44 KG/M2 | HEIGHT: 62 IN

## 2023-05-17 DIAGNOSIS — R31.29 MICROSCOPIC HEMATURIA: ICD-10-CM

## 2023-05-17 PROCEDURE — 99204 OFFICE O/P NEW MOD 45 MIN: CPT | Mod: VID | Performed by: PHYSICIAN ASSISTANT

## 2023-05-17 ASSESSMENT — PAIN SCALES - GENERAL: PAINLEVEL: NO PAIN (0)

## 2023-05-17 NOTE — LETTER
2023       RE: Luis Gutierrez  [address]  [facility] [address]     Dear Colleague,    Thank you for referring your patient, Luis Gutierrez, to the [facility] at [facility]. Please see a copy of my visit note below.    Pt will meet you in [employer]    Luis is a 42 year old who is being evaluated via a billable video visit.      How would you like to obtain your AVS? [facility]  If the video visit is dropped, the invitation should be resent by: Text to cell phone: [phone]  Will anyone else be joining your video visit? No        Video-Visit Details    Type of service:  Video Visit     Originating Location (pt. Location): Home    Distant Location (provider location):  On-site  Platform used for Video Visit: [employer]   Start time: 3:06pm  End time: 3:16pm    CC: Hematuria.    HPI: It is a pleasure to see   Luis Gutierrez, a very pleasant 42 year old adult, asked to be seen in repeat consultation by Dr. Ha for evaluation of persistent micro hematuria.     The patient denies any gross hematuria (other than noting brown urine the day after their COVID booster in ).    They void without difficulty.  Cytology neg.     21 - CT urogram: Normal with exception of duplicated left renal collecting system with very distal suspected union of the ureters toward the ureterovesical junction.    Hematuria Risk Factors:  Age >40: Yes     Smoking history: yes  Occupational exposure to chemicals or dyes (ie, benzenes, aromatic amines): no  History of urologic disorder or disease: no  History of irritative voiding symptoms: no  History of urinary tract infection: no  Analgesic abuse: no  History of pelvic irradiation: no    Past Medical History:   Diagnosis Date    Hypertension 5/10/12    Preeclampsia     Past Surgical History:   Procedure Laterality Date     SECTION  2012    CYSTOSCOPY N/A 2022    Procedure: Cystoscopy;   "Surgeon: Heide Mock MD;  [facility]    LAPAROSCOPIC HYSTERECTOMY SUPRACERVICAL, BILATERAL SALPINGO-OOPHORECTOMY, COMBINED N/A 12/20/2022    Procedure: HYSTERECTOMY, SUPRACERVICAL, LAPAROSCOPIC, WITH SALPINGO-OOPHORECTOMY;  Surgeon: Heide Mock MD;  [facility]    MASTECTOMY SIMPLE BILATERAL Bilateral 2/18/2022    Procedure: MASTECTOMY, BILATERAL, SIMPLE, WITH NIPPLE GRAFTS, ONQ;  Surgeon: Fiona Lama MD;  Location: [facility] OR     Current Outpatient Medications   Medication Sig Dispense Refill    BD HYPODERMIC NEEDLE 18G X 1\" MISC USE TO DRAW UP HORMONES ONCE WEEKLY 50 each 4    ibuprofen (ADVIL/MOTRIN) 800 MG tablet Take 1 tablet (800 mg) by mouth every 6 hours as needed for other (mild and/or inflammatory pain) 30 tablet 0    Needle, Disp, 25G X 5/8\" MISC 1 each once a week 50 each 1    syringe, disposable, (B-D SYRINGE LUER-COURTNEY) 1 ML MISC Use to draw hormones weekly 25 each 1    testosterone cypionate (DEPOTESTOSTERONE) 200 MG/ML injection INJECT 0.3 MLS (60 MG) SUBCUTANEOUS ONCE A WEEK 4 mL 5    acetaminophen (TYLENOL) 325 MG tablet Take 3 tablets (975 mg) by mouth every 6 hours as needed for mild pain (Patient not taking: Reported on 4/13/2023) 50 tablet 0    senna-docusate (SENOKOT-S/PERICOLACE) 8.6-50 MG tablet Take 1-2 tablets by mouth 2 times daily (Patient not taking: Reported on 4/13/2023) 30 tablet 0     No Known Allergies  FAMILY HISTORY: There is no reported history of genitourinary carcinoma.  There is no history of urolithiasis.      Social History     Socioeconomic History    Marital status: Single     Spouse name: Not on file    Number of children: Not on file    Years of education: Not on file    Highest education level: Not on file   Occupational History    Not on file   Tobacco Use    Smoking status: Every Day     Packs/day: 0.15     Years: 20.00     Pack years: 3.00     Types: Cigarettes     Start date: 9/1/2000     Last attempt to quit: 11/30/2020     Years since " "quittin.4    Smokeless tobacco: Never   Vaping Use    Vaping status: Never Used   Substance and Sexual Activity    Alcohol use: Yes    Drug use: Yes     Types: Marijuana    Sexual activity: Yes     Birth control/protection: None   Other Topics Concern    Not on file   Social History Narrative    Not on file     Social Determinants of Health     Financial Resource Strain: Not on file   Food Insecurity: Not on file   Transportation Needs: Not on file   Physical Activity: Not on file   Stress: Not on file   Social Connections: Not on file   Intimate Partner Violence: Not on file   Housing Stability: Not on file       PHYSICAL EXAM:   Vitals:    23 1429   Weight: 72.6 kg (160 lb)   Height: 1.575 m (5' 2\")     PSYCH: NAD  EYES: EOMI  MOUTH: MMM  NEURO: AAO x3    Lab on 02/10/2023   Component Date Value Ref Range Status    AST 02/10/2023 17  10 - 50 U/L Final    Female   All ages 10-35 U/L     Male   All ages 10-50 U/L        ALT 02/10/2023 14  10 - 50 U/L Final    Female   All ages 10-35 U/L     Male   All ages 10-50 U/L        Cholesterol 02/10/2023 269 (H)  <200 mg/dL Final    Triglycerides 02/10/2023 68  <150 mg/dL Final    Direct Measure HDL 02/10/2023 60  >=40 mg/dL Final    LDL Cholesterol Calculated 02/10/2023 195 (H)  <=100 mg/dL Final    Non HDL Cholesterol 02/10/2023 209 (H)  <130 mg/dL Final    Testosterone Total 02/10/2023 599  8 - 950 ng/dL Final    Color Urine 02/10/2023 Straw  Colorless, Straw, Light Yellow, Yellow Final    Appearance Urine 02/10/2023 Clear  Clear Final    Glucose Urine 02/10/2023 Negative  Negative mg/dL Final    Bilirubin Urine 02/10/2023 Negative  Negative Final    Ketones Urine 02/10/2023 Negative  Negative mg/dL Final    Specific Gravity Urine 02/10/2023 1.005  1.003 - 1.035 Final    Blood Urine 02/10/2023 Moderate (A)  Negative Final    pH Urine 02/10/2023 7.0  5.0 - 7.0 Final    Protein Albumin Urine 02/10/2023 30 (A)  Negative mg/dL Final    Urobilinogen Urine 02/10/2023 " Normal  Normal, 2.0 mg/dL Final    Nitrite Urine 02/10/2023 Negative  Negative Final    Leukocyte Esterase Urine 02/10/2023 Negative  Negative Final    RBC Urine 02/10/2023 7 (H)  <=2 /HPF Final    WBC Urine 02/10/2023 1  <=5 /HPF Final       ASSESSMENT and PLAN:    42 year old adult with persistent micro hematuria.      - Cystoscopy with the first available urologist to evaluate the interior of the bladder. Follow up for hematuria as recommended by urologist performing cystoscopic evaluation.  -Defer to MD follow-up if needs repeat CT Urogram.     Thank you for allowing me to participate in   Brenda's care. I will keep you updated of Luis Gutierrez progress, but please do not hesitate to contact me with any questions.    Daisy Rodriguez PA-C  [facility] Urology  26 minutes spent on the date of the encounter doing chart review, review of outside records, review of test results, interpretation of tests, patient visit and documentation.

## 2023-05-17 NOTE — PROGRESS NOTES
Pt will meet you in [employer]    Luis is a 42 year old who is being evaluated via a billable video visit.      How would you like to obtain your AVS? [employer]  If the video visit is dropped, the invitation should be resent by: Text to cell phone: [phone]  Will anyone else be joining your video visit? No        Video-Visit Details    Type of service:  Video Visit     Originating Location (pt. Location): Home    Distant Location (provider location):  On-site  Platform used for Video Visit: [employer]   Start time: 3:06pm  End time: 3:16pm    CC: Hematuria.    HPI: It is a pleasure to see   Luis Gutierrez, a very pleasant 42 year old adult, asked to be seen in repeat consultation by Dr. Ha for evaluation of persistent micro hematuria.     The patient denies any gross hematuria (other than noting brown urine the day after their COVID booster in ).    They void without difficulty.  Cytology neg.     21 - CT urogram: Normal with exception of duplicated left renal collecting system with very distal suspected union of the ureters toward the ureterovesical junction.    Hematuria Risk Factors:  Age >40: Yes     Smoking history: yes  Occupational exposure to chemicals or dyes (ie, benzenes, aromatic amines): no  History of urologic disorder or disease: no  History of irritative voiding symptoms: no  History of urinary tract infection: no  Analgesic abuse: no  History of pelvic irradiation: no    Past Medical History:   Diagnosis Date     Hypertension 5/10/12    Preeclampsia     Past Surgical History:   Procedure Laterality Date      SECTION  2012     CYSTOSCOPY N/A 2022    Procedure: Cystoscopy;  Surgeon: Heide Mock MD;  [facility]     LAPAROSCOPIC HYSTERECTOMY SUPRACERVICAL, BILATERAL SALPINGO-OOPHORECTOMY, COMBINED N/A 2022    Procedure: HYSTERECTOMY, SUPRACERVICAL, LAPAROSCOPIC, WITH SALPINGO-OOPHORECTOMY;  Surgeon: Heide Mock MD;  [facility]     MASTECTOMY  "SIMPLE BILATERAL Bilateral 2022    Procedure: MASTECTOMY, BILATERAL, SIMPLE, WITH NIPPLE GRAFTS, ONQ;  Surgeon: Fiona Lama MD;  Location: [facility] OR     Current Outpatient Medications   Medication Sig Dispense Refill     BD HYPODERMIC NEEDLE 18G X 1\" MISC USE TO DRAW UP HORMONES ONCE WEEKLY 50 each 4     ibuprofen (ADVIL/MOTRIN) 800 MG tablet Take 1 tablet (800 mg) by mouth every 6 hours as needed for other (mild and/or inflammatory pain) 30 tablet 0     Needle, Disp, 25G X 5/8\" MISC 1 each once a week 50 each 1     syringe, disposable, (B-D SYRINGE LUER-COURTNEY) 1 ML MISC Use to draw hormones weekly 25 each 1     testosterone cypionate (DEPOTESTOSTERONE) 200 MG/ML injection INJECT 0.3 MLS (60 MG) SUBCUTANEOUS ONCE A WEEK 4 mL 5     acetaminophen (TYLENOL) 325 MG tablet Take 3 tablets (975 mg) by mouth every 6 hours as needed for mild pain (Patient not taking: Reported on 2023) 50 tablet 0     senna-docusate (SENOKOT-S/PERICOLACE) 8.6-50 MG tablet Take 1-2 tablets by mouth 2 times daily (Patient not taking: Reported on 2023) 30 tablet 0     No Known Allergies  FAMILY HISTORY: There is no reported history of genitourinary carcinoma.  There is no history of urolithiasis.      Social History     Socioeconomic History     Marital status: Single     Spouse name: Not on file     Number of children: Not on file     Years of education: Not on file     Highest education level: Not on file   Occupational History     Not on file   Tobacco Use     Smoking status: Every Day     Packs/day: 0.15     Years: 20.00     Pack years: 3.00     Types: Cigarettes     Start date: 2000     Last attempt to quit: 2020     Years since quittin.4     Smokeless tobacco: Never   Vaping Use     Vaping status: Never Used   Substance and Sexual Activity     Alcohol use: Yes     Drug use: Yes     Types: Marijuana     Sexual activity: Yes     Birth control/protection: None   Other Topics Concern     Not on file   Social " "History Narrative     Not on file     Social Determinants of Health     Financial Resource Strain: Not on file   Food Insecurity: Not on file   Transportation Needs: Not on file   Physical Activity: Not on file   Stress: Not on file   Social Connections: Not on file   Intimate Partner Violence: Not on file   Housing Stability: Not on file       PHYSICAL EXAM:   Vitals:    05/17/23 1429   Weight: 72.6 kg (160 lb)   Height: 1.575 m (5' 2\")     PSYCH: NAD  EYES: EOMI  MOUTH: MMM  NEURO: AAO x3    Lab on 02/10/2023   Component Date Value Ref Range Status     AST 02/10/2023 17  10 - 50 U/L Final    Female   All ages 10-35 U/L     Male   All ages 10-50 U/L         ALT 02/10/2023 14  10 - 50 U/L Final    Female   All ages 10-35 U/L     Male   All ages 10-50 U/L         Cholesterol 02/10/2023 269 (H)  <200 mg/dL Final     Triglycerides 02/10/2023 68  <150 mg/dL Final     Direct Measure HDL 02/10/2023 60  >=40 mg/dL Final     LDL Cholesterol Calculated 02/10/2023 195 (H)  <=100 mg/dL Final     Non HDL Cholesterol 02/10/2023 209 (H)  <130 mg/dL Final     Testosterone Total 02/10/2023 599  8 - 950 ng/dL Final     Color Urine 02/10/2023 Straw  Colorless, Straw, Light Yellow, Yellow Final     Appearance Urine 02/10/2023 Clear  Clear Final     Glucose Urine 02/10/2023 Negative  Negative mg/dL Final     Bilirubin Urine 02/10/2023 Negative  Negative Final     Ketones Urine 02/10/2023 Negative  Negative mg/dL Final     Specific Gravity Urine 02/10/2023 1.005  1.003 - 1.035 Final     Blood Urine 02/10/2023 Moderate (A)  Negative Final     pH Urine 02/10/2023 7.0  5.0 - 7.0 Final     Protein Albumin Urine 02/10/2023 30 (A)  Negative mg/dL Final     Urobilinogen Urine 02/10/2023 Normal  Normal, 2.0 mg/dL Final     Nitrite Urine 02/10/2023 Negative  Negative Final     Leukocyte Esterase Urine 02/10/2023 Negative  Negative Final     RBC Urine 02/10/2023 7 (H)  <=2 /HPF Final     WBC Urine 02/10/2023 1  <=5 /HPF Final       ASSESSMENT and " PLAN:    42 year old adult with persistent micro hematuria.      - Cystoscopy with the first available urologist to evaluate the interior of the bladder. Follow up for hematuria as recommended by urologist performing cystoscopic evaluation.  -Defer to MD follow-up if needs repeat CT Urogram.     Thank you for allowing me to participate in   Brenda's care. I will keep you updated of Luis Gutierrez progress, but please do not hesitate to contact me with any questions.    Daisy Rodriguez PA-C  [facility] Urology  26 minutes spent on the date of the encounter doing chart review, review of outside records, review of test results, interpretation of tests, patient visit and documentation.

## 2023-05-17 NOTE — PATIENT INSTRUCTIONS
- Cystoscopy with the  urologist to evaluate the interior of the bladder. Follow up as recommended by the urologist.    CYSTOSCOPY    What is a Cystoscopy?  This is a procedure done to check for problems inside the bladder.  Problems may include polyps (growths), tumors, inflammation (swelling and redness) and other concerns.    The Urologist inserts a thin tube (called a cystoscope) into the bladder.  The tube is about the size of a pencil.  We will give you numbing medicine to reduce the pain or discomfort you may feel.    The Urologist will be able to see inside the bladder by filling the bladder with water.  The water makes it easier to see any problems that may be present. You will have a sense to need to urinate and this is normal.       How should I get ready for the exam?  Nothing to do to prepare. You may eat normally the day of the exam. There is no sedation, so you may drive yourself to and from if you can drive.       Please tell your doctor if:  You have a history of urinary tract infections.  You know that you have a tumor in your bladder.  You have bleeding problems.  You have any allergies.  You are or may be pregnant.      What happens after the exam?  You may go back to your normal diet and activity as you feel ready.    For the next two days after the exam, you may notice:  Some blood in your urine.  Some burning when you urinate (use the toilet).  An urge to urinate more often.  Bladder spasms.    These are normal after the procedure. They should go away on their own after a day or two.      You can help to relieve the above listed symptoms by:  Drinking 6 to 8 large glasses of water each day (includes drinks at meals).  This will help clear the urine.  Take warm baths to relieve pain and bladder spasms.  Do not add anything to the bath water.  You may take Tylenol (acetaminophen) per label instructions for discomfort.

## 2023-05-24 ENCOUNTER — TELEPHONE (OUTPATIENT)
Dept: UROLOGY | Facility: CLINIC | Age: 43
End: 2023-05-24
Payer: COMMERCIAL

## 2023-05-24 NOTE — TELEPHONE ENCOUNTER
----- Message from Brenna Lee sent at 5/23/2023 10:34 AM CDT -----  Regarding: RE: Cysto  Thank you, Cherelle... it's micro hematuria so I guess July would be ok.    ----- Message -----  From: Cherelle Hansen  Sent: 5/23/2023   8:46 AM CDT  To: Brenna Lee  Subject: RE: Cysto                                        We are booked until the middle of July for a cysto? If ok for care I will call pt  ----- Message -----  From: Brenna Lee  Sent: 5/19/2023  11:15 AM CDT  To: [facility]  Subject: FW: Cysto                                        Please call pt to schedule.  Thanks :)    ----- Message -----  From: Paris Corea  Sent: 5/18/2023   8:22 AM CDT  To: [facility] - Scheduling Pool  Subject: Cysto                                            Cysto, persistent micro hematuria (Had CT and urine), prefers [facility] site    [facility]  5/17/23

## 2023-05-24 NOTE — TELEPHONE ENCOUNTER
Spoke with pt and scheduled with Dr Blair on June 9th at 4:30 pm. Pt is active on [employer] if need to move date.

## 2023-06-07 ENCOUNTER — PRE VISIT (OUTPATIENT)
Dept: UROLOGY | Facility: CLINIC | Age: 43
End: 2023-06-07
Payer: COMMERCIAL

## 2023-06-07 NOTE — TELEPHONE ENCOUNTER
Reason for Visit: Cystoscopy    Diagnosis: microhematuria    Orders/Procedures/Records: in system    Contact Patient: n/a    Rooming Requirements: UA dip prior to getting ready for cystoscopy. If positive for Leuks and/or Nitrites, check with provider.      Julia Valladares LPN  06/07/23  2:55 PM

## 2023-06-09 ENCOUNTER — OFFICE VISIT (OUTPATIENT)
Dept: UROLOGY | Facility: CLINIC | Age: 43
End: 2023-06-09
Payer: COMMERCIAL

## 2023-06-09 VITALS
DIASTOLIC BLOOD PRESSURE: 79 MMHG | SYSTOLIC BLOOD PRESSURE: 116 MMHG | WEIGHT: 160 LBS | HEART RATE: 70 BPM | BODY MASS INDEX: 29.44 KG/M2 | HEIGHT: 62 IN

## 2023-06-09 DIAGNOSIS — R31.21 ASYMPTOMATIC MICROSCOPIC HEMATURIA: Primary | ICD-10-CM

## 2023-06-09 LAB
ALBUMIN UR-MCNC: >=300 MG/DL
APPEARANCE UR: CLEAR
BILIRUB UR QL STRIP: NEGATIVE
COLOR UR AUTO: YELLOW
GLUCOSE UR STRIP-MCNC: NEGATIVE MG/DL
HGB UR QL STRIP: ABNORMAL
KETONES UR STRIP-MCNC: NEGATIVE MG/DL
LEUKOCYTE ESTERASE UR QL STRIP: NEGATIVE
NITRATE UR QL: NEGATIVE
PH UR STRIP: 6 [PH] (ref 5–8)
SP GR UR STRIP: >=1.03 (ref 1–1.03)
UROBILINOGEN UR STRIP-ACNC: 0.2 E.U./DL

## 2023-06-09 PROCEDURE — 81003 URINALYSIS AUTO W/O SCOPE: CPT | Performed by: PATHOLOGY

## 2023-06-09 PROCEDURE — 52000 CYSTOURETHROSCOPY: CPT | Performed by: OBSTETRICS & GYNECOLOGY

## 2023-06-09 ASSESSMENT — PAIN SCALES - GENERAL: PAINLEVEL: MILD PAIN (3)

## 2023-06-09 NOTE — PROGRESS NOTES
Reason for Visit:  Cystoscopy    Clinical Data: Ms. Luis Gutierrez is a 42 year old female with a hx of microscopic hematuria    Cystoscopy procedure:  Pt. Was consented and placed in the lithotomy position.  She was cleaned and preparred in the usual fashion.  Lidocain gel was inserted into the urethra and given time to take effect.  A 16 fr flexible cystoscope was then inserted through the urethra and into the bladder.  The urethra was wnl.  The bladder was without trabeculation.  No tumors, diverticulae, or stones.  Bilateral u/o's were effluxing clear urine.  The cystoscope was then withdrawn.  The pt. Tolerated the procedure well.    A/P:  42 year old female with microscopic hematuria and normal cystoscopy  -F/U PRN    Thank you for allowing me to participate in the care of    Luis Gutierrez and I will keep you updated on Luis Gutierrez's progress.    Timmy Blair MD

## 2023-06-09 NOTE — LETTER
6/9/2023       RE: Luis Gutierrez  [address]  [facility] [address]     Dear Colleague,    Thank you for referring your patient, Luis Gutierrez, to the [facility] [place] at [facility]. Please see a copy of my visit note below.    Reason for Visit:  Cystoscopy    Clinical Data: Ms. Luis Gutierrez is a 42 year old female with a hx of microscopic hematuria    Cystoscopy procedure:  Pt. Was consented and placed in the lithotomy position.  She was cleaned and preparred in the usual fashion.  Lidocain gel was inserted into the urethra and given time to take effect.  A 16 fr flexible cystoscope was then inserted through the urethra and into the bladder.  The urethra was wnl.  The bladder was without trabeculation.  No tumors, diverticulae, or stones.  Bilateral u/o's were effluxing clear urine.  The cystoscope was then withdrawn.  The pt. Tolerated the procedure well.    A/P:  42 year old female with microscopic hematuria and normal cystoscopy  -F/U PRN    Thank you for allowing me to participate in the care of    Luis Gutierrez and I will keep you updated on Luis Gutierrez's progress.    Timmy Blair MD

## 2023-06-12 PROBLEM — R31.29 MICROSCOPIC HEMATURIA: Status: ACTIVE | Noted: 2023-06-12

## 2023-06-12 PROBLEM — Z87.59 HISTORY OF PRE-ECLAMPSIA: Status: ACTIVE | Noted: 2020-10-05

## 2023-06-30 NOTE — TELEPHONE ENCOUNTER
DIAGNOSIS: Pain of left heel/ images/ Renee Wiggins DO/ [facility]   APPOINTMENT DATE: 07/05/23   NOTES STATUS DETAILS   OFFICE NOTE from referring provider Internal 04/14/23, 04/13/23- Renee Wiggins DO[facility]   MEDICATION LIST Internal    XRAYS (IMAGES & REPORTS) Internal 04/13/23- XR L Foot- Renee Wiggins DO

## 2023-07-05 ENCOUNTER — PRE VISIT (OUTPATIENT)
Dept: ORTHOPEDICS | Facility: CLINIC | Age: 43
End: 2023-07-05

## 2023-07-05 ENCOUNTER — OFFICE VISIT (OUTPATIENT)
Dept: ORTHOPEDICS | Facility: CLINIC | Age: 43
End: 2023-07-05
Attending: FAMILY MEDICINE
Payer: COMMERCIAL

## 2023-07-05 ENCOUNTER — MYC MEDICAL ADVICE (OUTPATIENT)
Dept: FAMILY MEDICINE | Facility: CLINIC | Age: 43
End: 2023-07-05

## 2023-07-05 DIAGNOSIS — F64.9 GENDER DYSPHORIA: ICD-10-CM

## 2023-07-05 DIAGNOSIS — M79.672 PAIN OF LEFT HEEL: ICD-10-CM

## 2023-07-05 DIAGNOSIS — M72.2 PLANTAR FASCIITIS: Primary | ICD-10-CM

## 2023-07-05 PROCEDURE — 99203 OFFICE O/P NEW LOW 30 MIN: CPT | Performed by: FAMILY MEDICINE

## 2023-07-05 NOTE — LETTER
2023      RE: Luis Gutierrez  [address]  [facility] [address]     Dear Colleague,    Thank you for referring your patient, Luis Gutierrez, to the [facility] [place]. Please see a copy of my visit note below.    Left heel pain    Today, the patient reports that left heel pain since 2023. Pt was on leave from work and returned to work in February. Denies any injury, but states pt walks about 7 miles/day and carries a heavy tool belt.  Lots of stair climbing / ladders.  The pain is located over the plantar aspect of the heel. Pain is worse with in the morning and at the end of the day. pt wears supportive shoes during the day. Pt did obtain Red wing shoe supports. Denies any paresthesias.     She works as a carpentar          Imaging study below reviewed by me:  3 views left foot radiographs 2023 8:29 AM     History: Calcaneal pain x 2 months, evaluate for stress fracture; Pain  of left heel     Comparison: None available.     Findings:     AP, oblique, and lateral  views of the left foot were obtained.      No acute osseous abnormality.       Third tarsometatarsal joint is not well profiled. Lisfranc  articulation alignment is congruent on these non-weight bearing  images.     Plantar calcaneal enthesopathy.      Soft tissue is unremarkable.                                                                      Impression:  1. No acute osseous abnormality. Specifically, no radiographic  evidence for calcaneal stress fracture.  2. Plantar calcaneal enthesophyte.     NORAH LEE         PMH:  Past Medical History:   Diagnosis Date    Hypertension 5/10/12    Preeclampsia    Preeclampsia 10/5/2020    S/P  section 2012       Active problem list:  Patient Active Problem List   Diagnosis    History of pre-eclampsia    History of hemolysis, elevated liver enzymes, and low platelet (HELLP) syndrome    Nicotine dependence, uncomplicated,  unspecified nicotine product type    Depression    Receiving gender affirming care    Family history of malignant neoplasm of breast    Dyslipidemia    Family history of bladder cancer    Microscopic hematuria       FH:  Family History   Problem Relation Age of Onset    Hypertension Father     Substance Abuse Father     Cancer Maternal Grandfather 50        Bladder cancer. Smoker.    Heart Disease Maternal Grandfather     Asthma Daughter     Breast Cancer Mother     Glaucoma No family hx of     Macular Degeneration No family hx of        SH:  Social History     Socioeconomic History    Marital status: Single     Spouse name: Not on file    Number of children: Not on file    Years of education: Not on file    Highest education level: Not on file   Occupational History    Not on file   Tobacco Use    Smoking status: Every Day     Packs/day: 0.15     Years: 20.00     Pack years: 3.00     Types: Cigarettes     Start date: 2000     Last attempt to quit: 2020     Years since quittin.5    Smokeless tobacco: Never   Vaping Use    Vaping Use: Never used   Substance and Sexual Activity    Alcohol use: Yes    Drug use: Yes     Types: Marijuana    Sexual activity: Yes     Birth control/protection: None   Other Topics Concern    Not on file   Social History Narrative    Not on file     Social Determinants of Health     Financial Resource Strain: Not on file   Food Insecurity: Not on file   Transportation Needs: Not on file   Physical Activity: Not on file   Stress: Not on file   Social Connections: Not on file   Intimate Partner Violence: Not on file   Housing Stability: Not on file       MEDS:  See EMR, reviewed  ALL:  See EMR, reviewed    REVIEW OF SYSTEMS:  CONSTITUTIONAL:NEGATIVE for fever, chills, change in weight  INTEGUMENTARY/SKIN: NEGATIVE for worrisome rashes, moles or lesions  EYES: NEGATIVE for vision changes or irritation  ENT/MOUTH: NEGATIVE for ear, mouth and throat problems  RESP:NEGATIVE for  significant cough or SOB  BREAST: NEGATIVE for masses, tenderness or discharge  CV: NEGATIVE for chest pain, palpitations or peripheral edema  GI: NEGATIVE for nausea, abdominal pain, heartburn, or change in bowel habits  :NEGATIVE for frequency, dysuria, or hematuria  :NEGATIVE for frequency, dysuria, or hematuria  NEURO: NEGATIVE for weakness, dizziness or paresthesias  ENDOCRINE: NEGATIVE for temperature intolerance, skin/hair changes  HEME/ALLERGY/IMMUNE: NEGATIVE for bleeding problems  PSYCHIATRIC: NEGATIVE for changes in mood or affect      Objective: Tenderness at the plantar fascia origin of the left heel.  Nontender over the remainder of the heel pad.  Nontender of the Achilles tendon peroneal or posterior tibial tendon.  Normal range of motion of the great toe.  Neutral heel with neutral forefoot.  Overlying skin is normal.  Appropriate conversation affect.    a: Left-sided plantar fasciitis    Plan: Proper use of a night splint for the next 2 to 3 weeks explained, night splint provided.  Hamstring calf and heel cord stretches explained, provided.  Over-the-counter gel heel lifts for both heels until the problem improves.  Patient understands that nonsteroidal anti-inflammatories do not cause this to heal, and the cortisone injections have not been found to be curative.  Patient will try to maximize these conservative cares and follow-up if not improved.      Again, thank you for allowing me to participate in the care of your patient.      Sincerely,    Sha Reagan MD

## 2023-07-05 NOTE — TELEPHONE ENCOUNTER
"Last testosterone level checked 2/10/23    Request for medication refill:  testosterone cypionate (DEPOTESTOSTERONE) 200 MG/ML injection  Needle, Disp, 25G X 5/8\" MISC  syringe, disposable, (B-D SYRINGE LUER-COURTNEY) 1 ML MISC  BD HYPODERMIC NEEDLE 18G X 1\" MISC  Providers if patient needs an appointment and you are willing to give a one month supply please refill for one month and  send a letter/MyChart using \".SMILLIMITEDREFILL\" .smillimited and route chart to \"P SMI \" (Giving one month refill in non controlled medications is strongly recommended before denial)    If refill has been denied, meaning absolutely no refills without visit, please complete the smart phrase \".smirxrefuse\" and route it to the \"P SMI MED REFILLS\"  pool to inform the patient and the pharmacy.    Briana Moore RN        "

## 2023-07-05 NOTE — PROGRESS NOTES
Left heel pain    Today, the patient reports that left heel pain since 2023. Pt was on leave from work and returned to work in February. Denies any injury, but states pt walks about 7 miles/day and carries a heavy tool belt.  Lots of stair climbing / ladders.  The pain is located over the plantar aspect of the heel. Pain is worse with in the morning and at the end of the day. pt wears supportive shoes during the day. Pt did obtain Red wing shoe supports. Denies any paresthesias.     She works as a carpentar          Imaging study below reviewed by me:  3 views left foot radiographs 2023 8:29 AM     History: Calcaneal pain x 2 months, evaluate for stress fracture; Pain  of left heel     Comparison: None available.     Findings:     AP, oblique, and lateral  views of the left foot were obtained.      No acute osseous abnormality.       Third tarsometatarsal joint is not well profiled. Lisfranc  articulation alignment is congruent on these non-weight bearing  images.     Plantar calcaneal enthesopathy.      Soft tissue is unremarkable.                                                                      Impression:  1. No acute osseous abnormality. Specifically, no radiographic  evidence for calcaneal stress fracture.  2. Plantar calcaneal enthesophyte.     NORAH LEE         PMH:  Past Medical History:   Diagnosis Date     Hypertension 5/10/12    Preeclampsia     Preeclampsia 10/5/2020     S/P  section 2012       Active problem list:  Patient Active Problem List   Diagnosis     History of pre-eclampsia     History of hemolysis, elevated liver enzymes, and low platelet (HELLP) syndrome     Nicotine dependence, uncomplicated, unspecified nicotine product type     Depression     Receiving gender affirming care     Family history of malignant neoplasm of breast     Dyslipidemia     Family history of bladder cancer     Microscopic hematuria       FH:  Family History   Problem Relation  Age of Onset     Hypertension Father      Substance Abuse Father      Cancer Maternal Grandfather 50        Bladder cancer. Smoker.     Heart Disease Maternal Grandfather      Asthma Daughter      Breast Cancer Mother      Glaucoma No family hx of      Macular Degeneration No family hx of        SH:  Social History     Socioeconomic History     Marital status: Single     Spouse name: Not on file     Number of children: Not on file     Years of education: Not on file     Highest education level: Not on file   Occupational History     Not on file   Tobacco Use     Smoking status: Every Day     Packs/day: 0.15     Years: 20.00     Pack years: 3.00     Types: Cigarettes     Start date: 2000     Last attempt to quit: 2020     Years since quittin.5     Smokeless tobacco: Never   Vaping Use     Vaping Use: Never used   Substance and Sexual Activity     Alcohol use: Yes     Drug use: Yes     Types: Marijuana     Sexual activity: Yes     Birth control/protection: None   Other Topics Concern     Not on file   Social History Narrative     Not on file     Social Determinants of Health     Financial Resource Strain: Not on file   Food Insecurity: Not on file   Transportation Needs: Not on file   Physical Activity: Not on file   Stress: Not on file   Social Connections: Not on file   Intimate Partner Violence: Not on file   Housing Stability: Not on file       MEDS:  See EMR, reviewed  ALL:  See EMR, reviewed    REVIEW OF SYSTEMS:  CONSTITUTIONAL:NEGATIVE for fever, chills, change in weight  INTEGUMENTARY/SKIN: NEGATIVE for worrisome rashes, moles or lesions  EYES: NEGATIVE for vision changes or irritation  ENT/MOUTH: NEGATIVE for ear, mouth and throat problems  RESP:NEGATIVE for significant cough or SOB  BREAST: NEGATIVE for masses, tenderness or discharge  CV: NEGATIVE for chest pain, palpitations or peripheral edema  GI: NEGATIVE for nausea, abdominal pain, heartburn, or change in bowel habits  :NEGATIVE for  frequency, dysuria, or hematuria  :NEGATIVE for frequency, dysuria, or hematuria  NEURO: NEGATIVE for weakness, dizziness or paresthesias  ENDOCRINE: NEGATIVE for temperature intolerance, skin/hair changes  HEME/ALLERGY/IMMUNE: NEGATIVE for bleeding problems  PSYCHIATRIC: NEGATIVE for changes in mood or affect      Objective: Tenderness at the plantar fascia origin of the left heel.  Nontender over the remainder of the heel pad.  Nontender of the Achilles tendon peroneal or posterior tibial tendon.  Normal range of motion of the great toe.  Neutral heel with neutral forefoot.  Overlying skin is normal.  Appropriate conversation affect.    a: Left-sided plantar fasciitis    Plan: Proper use of a night splint for the next 2 to 3 weeks explained, night splint provided.  Hamstring calf and heel cord stretches explained, provided.  Over-the-counter gel heel lifts for both heels until the problem improves.  Patient understands that nonsteroidal anti-inflammatories do not cause this to heal, and the cortisone injections have not been found to be curative.  Patient will try to maximize these conservative cares and follow-up if not improved.

## 2023-07-06 RX ORDER — NEEDLES, DISPOSABLE 25GX5/8"
NEEDLE, DISPOSABLE MISCELLANEOUS
Qty: 100 EACH | Refills: 4 | Status: SHIPPED | OUTPATIENT
Start: 2023-07-06 | End: 2024-03-10

## 2023-07-06 RX ORDER — SYRINGE, DISPOSABLE, 1 ML
SYRINGE, EMPTY DISPOSABLE MISCELLANEOUS
Qty: 1000 EACH | Refills: 1 | Status: SHIPPED | OUTPATIENT
Start: 2023-07-06 | End: 2024-03-10

## 2023-07-06 RX ORDER — TESTOSTERONE CYPIONATE 200 MG/ML
60 INJECTION, SOLUTION INTRAMUSCULAR WEEKLY
Qty: 4 ML | Refills: 5 | Status: SHIPPED | OUTPATIENT
Start: 2023-07-06 | End: 2024-03-10

## 2023-09-03 ENCOUNTER — OFFICE VISIT (OUTPATIENT)
Dept: URGENT CARE | Facility: URGENT CARE | Age: 43
End: 2023-09-03
Payer: COMMERCIAL

## 2023-09-03 ENCOUNTER — NURSE TRIAGE (OUTPATIENT)
Dept: NURSING | Facility: CLINIC | Age: 43
End: 2023-09-03
Payer: COMMERCIAL

## 2023-09-03 VITALS
OXYGEN SATURATION: 99 % | RESPIRATION RATE: 16 BRPM | DIASTOLIC BLOOD PRESSURE: 73 MMHG | HEART RATE: 66 BPM | TEMPERATURE: 98 F | SYSTOLIC BLOOD PRESSURE: 114 MMHG | BODY MASS INDEX: 29.26 KG/M2 | WEIGHT: 160 LBS

## 2023-09-03 DIAGNOSIS — R05.1 ACUTE COUGH: Primary | ICD-10-CM

## 2023-09-03 LAB — SARS-COV-2 RNA RESP QL NAA+PROBE: NEGATIVE

## 2023-09-03 PROCEDURE — 99213 OFFICE O/P EST LOW 20 MIN: CPT | Performed by: FAMILY MEDICINE

## 2023-09-03 PROCEDURE — 87635 SARS-COV-2 COVID-19 AMP PRB: CPT | Performed by: FAMILY MEDICINE

## 2023-09-03 NOTE — TELEPHONE ENCOUNTER
Pt calling that she developed COVID symptoms of congestion, cold 9/1/23 and has taken a couple COVID home tests that have been negative, pt would like a PCR test and asking for guidance on next steps.  Pt intends to go to [facility] [place], [place].  Zahira Rodney RN  FNA Nurse Advisor    Reason for Disposition   [1] COVID-19 infection suspected by caller or triager AND [2] mild symptoms (cough, fever, or others) AND [3] negative COVID-19 rapid test    Additional Information   Negative: SEVERE difficulty breathing (e.g., struggling for each breath, speaks in single words)   Negative: Difficult to awaken or acting confused (e.g., disoriented, slurred speech)   Negative: Bluish (or gray) lips or face now   Negative: Shock suspected (e.g., cold/pale/clammy skin, too weak to stand, low BP, rapid pulse)   Negative: Sounds like a life-threatening emergency to the triager   Negative: [1] Diagnosed or suspected COVID-19 AND [2] symptoms lasting 3 or more weeks   Negative: [1] COVID-19 exposure AND [2] no symptoms   Negative: COVID-19 vaccine reaction suspected (e.g., fever, headache, muscle aches) occurring 1 to 3 days after getting vaccine   Negative: COVID-19 vaccine, questions about   Negative: [1] Lives with someone known to have influenza (flu test positive) AND [2] flu-like symptoms (e.g., cough, runny nose, sore throat, SOB; with or without fever)   Negative: [1] Adult with possible COVID-19 symptoms AND [2] triager concerned about severity of symptoms or other causes   Negative: COVID-19 and breastfeeding, questions about   Negative: SEVERE or constant chest pain or pressure  (Exception: Mild central chest pain, present only when coughing.)   Negative: MODERATE difficulty breathing (e.g., speaks in phrases, SOB even at rest, pulse 100-120)   Negative: [1] Headache AND [2] stiff neck (can't touch chin to chest)   Negative: Oxygen level (e.g., pulse oximetry) 90 percent or lower   Negative: Chest pain or  pressure   Negative: Patient sounds very sick or weak to the triager   Negative: MILD difficulty breathing (e.g., minimal/no SOB at rest, SOB with walking, pulse <100)   Negative: Fever > 103 F (39.4 C)   Negative: [1] Fever > 101 F (38.3 C) AND [2] age > 60 years   Negative: [1] Fever > 100.0 F (37.8 C) AND [2] bedridden (e.g., nursing home patient, CVA, chronic illness, recovering from surgery)   Negative: [1] HIGH RISK for severe COVID complications (e.g., weak immune system, age > 64 years, obesity with BMI > 25, pregnant, chronic lung disease or other chronic medical condition) AND [2] COVID symptoms (e.g., cough, fever)  (Exceptions: Already seen by PCP and no new or worsening symptoms.)   Negative: [1] HIGH RISK patient AND [2] influenza is widespread in the community AND [3] ONE OR MORE respiratory symptoms: cough, sore throat, runny or stuffy nose   Negative: [1] HIGH RISK patient AND [2] influenza exposure within the last 7 days AND [3] ONE OR MORE respiratory symptoms: cough, sore throat, runny or stuffy nose   Negative: Oxygen level (e.g., pulse oximetry) 91 to 94 percent   Negative: Fever present > 3 days (72 hours)   Negative: [1] Fever returns after gone for over 24 hours AND [2] symptoms worse or not improved   Negative: [1] Continuous (nonstop) coughing interferes with work or school AND [2] no improvement using cough treatment per Care Advice    Protocols used: Coronavirus (COVID-19) Diagnosed or [id number]

## 2023-09-03 NOTE — PROGRESS NOTES
SUBJECTIVE: Luis Gutierrez is a 43 year old adult presenting with a chief complaint of covid exposre with cough.  Onset of symptoms was day(s) ago.  Predisposing factors include COVID exposre.    Past Medical History:   Diagnosis Date    Hypertension 5/10/12    Preeclampsia    Preeclampsia 10/5/2020    S/P  section 2012     No Known Allergies  Social History     Tobacco Use    Smoking status: Every Day     Packs/day: 0.15     Years: 20.00     Pack years: 3.00     Types: Cigarettes     Start date: 2000     Last attempt to quit: 2020     Years since quittin.7    Smokeless tobacco: Never   Substance Use Topics    Alcohol use: Yes       ROS:  SKIN: no rash  GI: no vomiting    OBJECTIVE:  /73   Pulse 66   Temp 98  F (36.7  C) (Tympanic)   Resp 16   Wt 72.6 kg (160 lb)   LMP 2022 (Approximate)   SpO2 99%   BMI 29.26 kg/m  GENERAL APPEARANCE: healthy, alert and no distress  EYES: EOMI,  PERRL, conjunctiva clear  HENT: ear canals and TM's normal.  Nose and mouth without ulcers, erythema or lesions  RESP: lungs clear to auscultation - no rales, rhonchi or wheezes  SKIN: no suspicious lesions or rashes      ICD-10-CM    1. Acute cough  R05.1 Symptomatic COVID-19 Virus (Coronavirus) by PCR Nose          Fluids/Rest, f/u if worse/not any better

## 2023-10-23 DIAGNOSIS — E78.5 HYPERLIPIDEMIA LDL GOAL <130: Primary | ICD-10-CM

## 2023-10-24 DIAGNOSIS — F64.0 TRANSSEXUALISM: Primary | ICD-10-CM

## 2023-11-11 ENCOUNTER — OFFICE VISIT (OUTPATIENT)
Dept: URGENT CARE | Facility: URGENT CARE | Age: 43
End: 2023-11-11
Payer: COMMERCIAL

## 2023-11-11 VITALS
BODY MASS INDEX: 29.26 KG/M2 | HEART RATE: 69 BPM | DIASTOLIC BLOOD PRESSURE: 70 MMHG | WEIGHT: 160 LBS | OXYGEN SATURATION: 98 % | TEMPERATURE: 97.5 F | SYSTOLIC BLOOD PRESSURE: 126 MMHG

## 2023-11-11 DIAGNOSIS — T14.8XXA SPLINTER: Primary | ICD-10-CM

## 2023-11-11 PROCEDURE — 99213 OFFICE O/P EST LOW 20 MIN: CPT | Performed by: PHYSICIAN ASSISTANT

## 2023-11-11 NOTE — PROGRESS NOTES
"SUBJECTIVE:  Luis Gutierrez is a 43 year old adult who presents to the clinic today for left 4th digit subungual splinter for 1 day    Past Medical History:   Diagnosis Date    Hypertension 5/10/12    Preeclampsia    Preeclampsia 10/5/2020    S/P  section 2012     Current Outpatient Medications   Medication Sig Dispense Refill    acetaminophen (TYLENOL) 325 MG tablet Take 3 tablets (975 mg) by mouth every 6 hours as needed for mild pain (Patient not taking: Reported on 2023) 50 tablet 0    ibuprofen (ADVIL/MOTRIN) 800 MG tablet Take 1 tablet (800 mg) by mouth every 6 hours as needed for other (mild and/or inflammatory pain) (Patient not taking: Reported on 2023) 30 tablet 0    needle, disp, (BD HYPODERMIC NEEDLE) 18G X 1\" MISC USE TO DRAW UP HORMONES ONCE WEEKLY (Patient not taking: Reported on 2023) 100 each 4    Needle, Disp, 25G X 5/8\" MISC 1 each once a week (Patient not taking: Reported on 2023) 100 each 1    senna-docusate (SENOKOT-S/PERICOLACE) 8.6-50 MG tablet Take 1-2 tablets by mouth 2 times daily (Patient not taking: Reported on 2023) 30 tablet 0    syringe, disposable, (B-D SYRINGE LUER-COURTNEY) 1 ML MISC Use to draw hormones weekly (Patient not taking: Reported on 2023) 1000 each 1    testosterone cypionate (DEPOTESTOSTERONE) 200 MG/ML injection Inject 0.3 mLs (60 mg) Subcutaneous once a week (Patient not taking: Reported on 2023) 4 mL 5     Social History     Tobacco Use    Smoking status: Every Day     Packs/day: 0.15     Years: 20.00     Additional pack years: 0.00     Total pack years: 3.00     Types: Cigarettes     Start date: 2000     Last attempt to quit: 2020     Years since quittin.9     Passive exposure: Never    Smokeless tobacco: Never   Substance Use Topics    Alcohol use: Yes       ROS:  Review of systems negative except as stated above.    EXAM:   /70   Pulse 69   Temp 97.5  F (36.4  C) (Tympanic)   Wt 72.6 kg (160 " lb)   LMP 12/01/2022 (Approximate)   SpO2 98%   BMI 29.26 kg/m    GENERAL: alert, no acute distress.  SKIN: dark line beneath bnail    Procedure: unable to remove    ASSESSMENT:  (T14.8XXA) Splinter  (primary encounter diagnosis)  Comment: unable to remove  Plan: drawing salve recommended

## 2023-12-13 ENCOUNTER — TELEPHONE (OUTPATIENT)
Dept: UROLOGY | Facility: CLINIC | Age: 43
End: 2023-12-13
Payer: COMMERCIAL

## 2023-12-13 NOTE — TELEPHONE ENCOUNTER
Left detailed voice message regarding one year follow up in June. Provided urology scheduling number for pt to call back

## 2024-01-08 ENCOUNTER — TELEPHONE (OUTPATIENT)
Dept: FAMILY MEDICINE | Facility: CLINIC | Age: 44
End: 2024-01-08

## 2024-01-08 ENCOUNTER — VIRTUAL VISIT (OUTPATIENT)
Dept: URGENT CARE | Facility: CLINIC | Age: 44
End: 2024-01-08
Payer: COMMERCIAL

## 2024-01-08 ENCOUNTER — LAB (OUTPATIENT)
Dept: LAB | Facility: CLINIC | Age: 44
End: 2024-01-08
Attending: INTERNAL MEDICINE
Payer: COMMERCIAL

## 2024-01-08 DIAGNOSIS — R05.1 ACUTE COUGH: Primary | ICD-10-CM

## 2024-01-08 DIAGNOSIS — R05.1 ACUTE COUGH: ICD-10-CM

## 2024-01-08 LAB
FLUAV AG SPEC QL IA: NEGATIVE
FLUBV AG SPEC QL IA: NEGATIVE

## 2024-01-08 PROCEDURE — 99442 PR PHYSICIAN TELEPHONE EVALUATION 11-20 MIN: CPT

## 2024-01-08 PROCEDURE — 87804 INFLUENZA ASSAY W/OPTIC: CPT

## 2024-01-08 PROCEDURE — 87635 SARS-COV-2 COVID-19 AMP PRB: CPT

## 2024-01-08 NOTE — PROGRESS NOTES
"  Luis Gutierrez is a 43 year old adult who is being evaluated via a billable telephone visit.      The patient has been notified of following at the time patient scheduled visit:     \"This telephone visit will be conducted via a phone call between you and your physician/provider. We have found that certain health care needs can be provided without the need for a physical exam.  This service lets us provide the care you need with a phone conversation.  If a prescription is necessary we can send it directly to your pharmacy.  If lab work is needed we can place an order for that and you can then stop by our lab to have the test done at a later time.\"   Patient has given consent for telephone visit?  Yes    SUBJECTIVE:  Luis Gutierrez is an 43 year old adult who presents for not feeling well.  Sxs started 2 days ago and has cough, sore throat and body aches.  Not check temp but sometimes feels feverish.  Her partner has covid but pt has tested negative at home an would like a pcr test done.  No shortness of breath or v/d.      PMH:   has a past medical history of Hypertension (5/10/12), Preeclampsia (10/5/2020), and S/P  section (2012).  Patient Active Problem List   Diagnosis    History of pre-eclampsia    History of hemolysis, elevated liver enzymes, and low platelet (HELLP) syndrome    Nicotine dependence, uncomplicated, unspecified nicotine product type    Depression    Receiving gender affirming care    Family history of malignant neoplasm of breast    Dyslipidemia    Family history of bladder cancer    Microscopic hematuria     Social History     Socioeconomic History    Marital status: Single   Tobacco Use    Smoking status: Every Day     Packs/day: 0.15     Years: 20.00     Additional pack years: 0.00     Total pack years: 3.00     Types: Cigarettes     Start date: 2000     Last attempt to quit: 2020     Years since quitting: 3.1     Passive exposure: Never    Smokeless tobacco: Never " "  Vaping Use    Vaping Use: Never used   Substance and Sexual Activity    Alcohol use: Yes    Drug use: Yes     Types: Marijuana    Sexual activity: Yes     Birth control/protection: None     Family History   Problem Relation Age of Onset    Hypertension Father     Substance Abuse Father     Cancer Maternal Grandfather 50        Bladder cancer. Smoker.    Heart Disease Maternal Grandfather     Asthma Daughter     Breast Cancer Mother     Glaucoma No family hx of     Macular Degeneration No family hx of        ALLERGIES:  Patient has no known allergies.    Current Outpatient Medications   Medication    acetaminophen (TYLENOL) 325 MG tablet    ibuprofen (ADVIL/MOTRIN) 800 MG tablet    needle, disp, (BD HYPODERMIC NEEDLE) 18G X 1\" MISC    Needle, Disp, 25G X 5/8\" MISC    senna-docusate (SENOKOT-S/PERICOLACE) 8.6-50 MG tablet    syringe, disposable, (B-D SYRINGE LUER-COURTNEY) 1 ML MISC    testosterone cypionate (DEPOTESTOSTERONE) 200 MG/ML injection     No current facility-administered medications for this visit.         ROS:  ROS is done and is negative for general/constitutional, eye, ENT, Respiratory, cardiovascular, GI, , Skin, musculoskeletal except as noted elsewhere.  All other review of systems negative except as noted elsewhere.      OBJECTIVE:    No vital signs taken as is virtual visit.  GENERAL : Alert and oriented.  No distress detected.    RESP: No respiratory distress detected during phone conversation           ASSESSMENT/PLAN:    ASSESSMENT / PLAN:  (R05.1) Acute cough  (primary encounter diagnosis)  Comment: sxs and close exposure concerning for covid.  Also consider possible influenza or other virus.  Plan: Symptomatic COVID-19 Virus (Coronavirus) by         PCR, Influenza A & B Antigen - Clinic Collect        Await test results and advised to seek treatment if positive.  I reviewed supportive care, otc meds to use if needed, expected course, and signs of concern.  Follow up as needed or if does not " improve within 5 day(s) or if worsens in any way.  Reviewed red flag symptoms and is to go to the ER if experiences any of these.        See [facility] for orders, medications, letters, patient instructions    Rafia Benavides MD  1/8/2024, 3:37 PM      Phone call duration:  11 minutes

## 2024-01-08 NOTE — TELEPHONE ENCOUNTER
Spoke with patient who is hoping to get a PCR test as they are testing negative for covid but have been exposed. No available appointments today. Patient is going to try same day appointment for covid through [facility], they will call back if unable to have an appointment and we will schedule for a virtual appointment tomorrow.   Raven Landeros RN

## 2024-01-08 NOTE — TELEPHONE ENCOUNTER
[facility] phone call message-patient reporting a symptom:     Symptom: Cough, body aches, sore throat. Partner tested positive for Covid on 1/6, patient is testing negative on [place] and partner is isolating.    Same Day Visit Offered: N/A    Additional comments:     OK to leave message on voice mail? Yes    Primary language: English      needed? No    Call taken on January 8, 2024 at 9:41 AM by Rodney Lopez

## 2024-01-09 LAB — SARS-COV-2 RNA RESP QL NAA+PROBE: NEGATIVE

## 2024-03-10 ENCOUNTER — MYC REFILL (OUTPATIENT)
Dept: FAMILY MEDICINE | Facility: CLINIC | Age: 44
End: 2024-03-10
Payer: COMMERCIAL

## 2024-03-10 DIAGNOSIS — F64.9 GENDER DYSPHORIA: ICD-10-CM

## 2024-03-11 NOTE — TELEPHONE ENCOUNTER
"Request for medication refill:  needle, disp, (BD HYPODERMIC NEEDLE) 18G X 1\" MISC     Needle, Disp, 25G X 5/8\" MISC     syringe, disposable, (B-D SYRINGE LUER-COURTNEY) 1 ML MISC     testosterone cypionate (DEPOTESTOSTERONE) 200 MG/ML injection     Providers if patient needs an appointment and you are willing to give a one month supply please refill for one month and  send a letter/MyChart using \".SMILLIMITEDREFILL\" .smillimited and route chart to \"P SMI \" (Giving one month refill in non controlled medications is strongly recommended before denial)    If refill has been denied, meaning absolutely no refills without visit, please complete the smart phrase \".smirxrefuse\" and route it to the \"P SMI MED REFILLS\"  pool to inform the patient and the pharmacy.    Demetrius Manzano, CMA      "

## 2024-03-12 RX ORDER — SYRINGE, DISPOSABLE, 1 ML
SYRINGE, EMPTY DISPOSABLE MISCELLANEOUS
Qty: 1000 EACH | Refills: 1 | Status: SHIPPED | OUTPATIENT
Start: 2024-03-12 | End: 2024-09-06

## 2024-03-12 RX ORDER — NEEDLES, DISPOSABLE 25GX5/8"
NEEDLE, DISPOSABLE MISCELLANEOUS
Qty: 100 EACH | Refills: 4 | Status: SHIPPED | OUTPATIENT
Start: 2024-03-12 | End: 2024-09-06

## 2024-03-12 RX ORDER — TESTOSTERONE CYPIONATE 200 MG/ML
60 INJECTION, SOLUTION INTRAMUSCULAR WEEKLY
Qty: 4 ML | Refills: 1 | Status: SHIPPED | OUTPATIENT
Start: 2024-03-12 | End: 2024-06-24

## 2024-05-01 ENCOUNTER — HOSPITAL ENCOUNTER (EMERGENCY)
Facility: CLINIC | Age: 44
Discharge: HOME | End: 2024-05-01
Attending: EMERGENCY MEDICINE | Admitting: EMERGENCY MEDICINE
Payer: COMMERCIAL

## 2024-05-01 VITALS
SYSTOLIC BLOOD PRESSURE: 129 MMHG | DIASTOLIC BLOOD PRESSURE: 82 MMHG | OXYGEN SATURATION: 97 % | HEART RATE: 73 BPM | RESPIRATION RATE: 16 BRPM | TEMPERATURE: 98 F

## 2024-05-01 DIAGNOSIS — S05.01XA ABRASION OF RIGHT CORNEA, INITIAL ENCOUNTER: ICD-10-CM

## 2024-05-01 PROCEDURE — 99284 EMERGENCY DEPT VISIT MOD MDM: CPT | Performed by: EMERGENCY MEDICINE

## 2024-05-01 PROCEDURE — 250N000009 HC RX 250: Performed by: EMERGENCY MEDICINE

## 2024-05-01 RX ORDER — PROPARACAINE HYDROCHLORIDE 5 MG/ML
1 SOLUTION/ DROPS OPHTHALMIC ONCE
Status: COMPLETED | OUTPATIENT
Start: 2024-05-01 | End: 2024-05-01

## 2024-05-01 RX ORDER — ERYTHROMYCIN 5 MG/G
0.5 OINTMENT OPHTHALMIC AT BEDTIME
Qty: 3.5 G | Refills: 0 | Status: SHIPPED | OUTPATIENT
Start: 2024-05-01 | End: 2024-05-06

## 2024-05-01 RX ORDER — OFLOXACIN 3 MG/ML
1-2 SOLUTION/ DROPS OPHTHALMIC 4 TIMES DAILY
Qty: 5 ML | Refills: 0 | Status: SHIPPED | OUTPATIENT
Start: 2024-05-01

## 2024-05-01 RX ADMIN — PROPARACAINE HYDROCHLORIDE 1 DROP: 5 SOLUTION/ DROPS OPHTHALMIC at 09:44

## 2024-05-01 RX ADMIN — FLUORESCEIN SODIUM 1 STRIP: 1 STRIP OPHTHALMIC at 09:44

## 2024-05-01 ASSESSMENT — VISUAL ACUITY
OS: 20/20
OD: 20/15

## 2024-05-01 ASSESSMENT — COLUMBIA-SUICIDE SEVERITY RATING SCALE - C-SSRS
1. IN THE PAST MONTH, HAVE YOU WISHED YOU WERE DEAD OR WISHED YOU COULD GO TO SLEEP AND NOT WAKE UP?: NO
2. HAVE YOU ACTUALLY HAD ANY THOUGHTS OF KILLING YOURSELF IN THE PAST MONTH?: NO
6. HAVE YOU EVER DONE ANYTHING, STARTED TO DO ANYTHING, OR PREPARED TO DO ANYTHING TO END YOUR LIFE?: NO

## 2024-05-01 NOTE — ED PROVIDER NOTES
"    [place] EMERGENCY DEPARTMENT ([facility])    24       ED PROVIDER NOTE    History     Chief Complaint   Patient presents with    Eye Problem     HPI  Luis Gutierrez is a 43 year old adult without pertinent PMH who presents to the Emergency Department with eye problem.    Patient reports having a particle of a nail in R eye while cutting floorboard ~30 minutes prior to arrival. They did flush their eye at home. They now endorses eye pain with foreign body sensation. They denies vision changes.     Patient's TDaP is up-to-date (last on 10/05/2020).    Past Medical History  Past Medical History:   Diagnosis Date    Hypertension 5/10/12    Preeclampsia    Preeclampsia 10/5/2020    S/P  section 2012     Past Surgical History:   Procedure Laterality Date     SECTION  2012    CYSTOSCOPY N/A 2022    Procedure: Cystoscopy;  Surgeon: Heide Mock MD;  [facility]    LAPAROSCOPIC HYSTERECTOMY SUPRACERVICAL, BILATERAL SALPINGO-OOPHORECTOMY, COMBINED N/A 2022    Procedure: HYSTERECTOMY, SUPRACERVICAL, LAPAROSCOPIC, WITH SALPINGO-OOPHORECTOMY;  Surgeon: Heide Mock MD;  [facility]    MASTECTOMY SIMPLE BILATERAL Bilateral 2022    Procedure: MASTECTOMY, BILATERAL, SIMPLE, WITH NIPPLE GRAFTS, ONQ;  Surgeon: Fiona Lama MD;  Location: [facility] OR     erythromycin (ROMYCIN) 5 MG/GM ophthalmic ointment  ofloxacin (OCUFLOX) 0.3 % ophthalmic solution  acetaminophen (TYLENOL) 325 MG tablet  ibuprofen (ADVIL/MOTRIN) 800 MG tablet  needle, disp, (BD HYPODERMIC NEEDLE) 18G X 1\" MISC  Needle, Disp, 25G X 5/8\" MISC  senna-docusate (SENOKOT-S/PERICOLACE) 8.6-50 MG tablet  syringe, disposable, (B-D SYRINGE LUER-COURTNEY) 1 ML MISC  testosterone cypionate (DEPOTESTOSTERONE) 200 MG/ML injection      No Known Allergies  Family History  Family History   Problem Relation Age of Onset    Hypertension Father     Substance Abuse Father     Cancer Maternal Grandfather 50    "     Bladder cancer. Smoker.    Heart Disease Maternal Grandfather     Asthma Daughter     Breast Cancer Mother     Glaucoma No family hx of     Macular Degeneration No family hx of      Social History   Social History     Tobacco Use    Smoking status: Every Day     Current packs/day: 0.00     Average packs/day: 0.2 packs/day for 20.2 years (3.0 ttl pk-yrs)     Types: Cigarettes     Start date: 9/1/2000     Last attempt to quit: 11/30/2020     Years since quitting: 3.4     Passive exposure: Never    Smokeless tobacco: Never   Vaping Use    Vaping status: Never Used   Substance Use Topics    Alcohol use: Yes    Drug use: Yes     Types: Marijuana         A complete review of systems was performed with pertinent positives and negatives noted in the HPI, and all other systems negative.    Physical Exam   BP: 129/82  Pulse: 73  Temp: 98  F (36.7  C)  Resp: 16  SpO2: 97 %  Physical Exam  Physical Exam   Constitutional: Pt is oriented to person, place, and time.Pt appears well-developed and well-nourished.   HENT:   Head: Normocephalic and atraumatic.   Eyes: Conjunctivae are normal. Pupils are equal, round, and reactive to light. Increased fluorescein uptake in right lower eye inferior to iris; no foreign body visualized, negative tae sign   Neck: Normal range of motion. Neck supple.   Cardiovascular: Normal rate, regular rhythm,   Pulmonary/Chest: Effort normal and breath sounds normal. No respiratory distress.   Neurological: Pt is alert and oriented to person, place, and time. Normal reflexes.   Skin: Skin is warm and dry. No rash noted.   Psychiatric: Pt has a normal mood and affect.       ED Course, Procedures, & Data      Procedures              No results found for any visits on 05/01/24.  Medications   fluorescein (FUL-BERNADETTE) ophthalmic strip 1 strip (1 strip Right Eye $Given 5/1/24 0957)   proparacaine (ALCAINE) 0.5 % ophthalmic solution 1 drop (1 drop Right Eye $Given by Other 5/1/24 0944)     Labs Ordered and  Resulted from Time of ED Arrival to Time of ED Departure - No data to display  No orders to display          Critical care was not performed.     Medical Decision Making  The patient's presentation was of low complexity (an acute and uncomplicated illness or injury).    The patient's evaluation involved:  history and exam without other MDM data elements    The patient's management necessitated moderate risk (prescription drug management including medications given in the ED).    Assessment & Plan    Luis Gutierrez is a 43 year old adult without pertinent PMH who presents to the Emergency Department with eye problem.    Patient reports having a particle of a nail in R eye while cutting floorboard ~30 minutes prior to arrival. They did flush their eye at home. They now endorses eye pain with foreign body sensation. They denies vision changes.     Patient is nontoxic-appearing on exam, his vital signs within normal limits.  Tetanus immunization reviewed and is up-to-date within the last 5 years.  On exam patient has intact pupillary response, normal equal visual acuity bilaterally, mild conjunctival erythema on the right eye.  Proparacaine eyedrops applied and fluorescein staining demonstrated increased uptake just inferior to the iris on the right side consistent with corneal abrasion.  No foreign body identified, negative Carola sign, no concerns for deeper injury at this time.  Erythromycin and ofloxacin drops prescribed; patient discharged with outpatient follow-up and return precautions.    I have reviewed the nursing notes. I have reviewed the findings, diagnosis, plan and need for follow up with the patient.    Discharge Medication List as of 5/1/2024  9:43 AM        START taking these medications    Details   erythromycin (ROMYCIN) 5 MG/GM ophthalmic ointment Place 0.5 inches into the right eye at bedtime for 5 daysDisp-3.5 g, [id number]      ofloxacin (OCUFLOX) 0.3 % ophthalmic solution Place 1-2 drops  into the right eye 4 times daily, Disp-5 mL, R-0, E-Prescribe             Final diagnoses:   Abrasion of right cornea, initial encounter       James Perez MD  [facility] EMERGENCY DEPARTMENT  5/1/2024        James Perez MD  05/06/24 1923

## 2024-05-01 NOTE — ED TRIAGE NOTES
Pt says approx 30 min ago she got a particle of a nail in her R eye while cutting up floorboards. She flushed her eye out at home. Pt says her eye hurts and it feels like something is still in there. No vision changes.pt says she saw the particle that she got out and it was a silver  of a piece of metal. It was done at a very old building on campus and the nail is old. Pt says she is up to date on her tetanus.

## 2024-05-28 NOTE — PROGRESS NOTES
IVF form faxed to Alexandru at [phone]. Copy also sent to patient via [employer].    Preceptor Attestation:   Patient seen, evaluated and discussed with the resident. I have verified the content of the note, which accurately reflects my assessment of the patient and the plan of care.   Supervising Physician:  Caren Truong MD

## 2024-06-22 DIAGNOSIS — F64.9 GENDER DYSPHORIA: ICD-10-CM

## 2024-06-24 RX ORDER — TESTOSTERONE CYPIONATE 200 MG/ML
60 INJECTION, SOLUTION INTRAMUSCULAR WEEKLY
Qty: 4 ML | Refills: 0 | Status: SHIPPED | OUTPATIENT
Start: 2024-06-24 | End: 2024-09-03

## 2024-07-05 ENCOUNTER — TELEPHONE (OUTPATIENT)
Dept: FAMILY MEDICINE | Facility: CLINIC | Age: 44
End: 2024-07-05
Payer: COMMERCIAL

## 2024-07-05 NOTE — TELEPHONE ENCOUNTER
Prior Authorization Retail Medication Request    Medication/Dose: testosterone cypionate (DEPOTESTOSTERONE) 200 MG/ML injection  Diagnosis and ICD code (if different than what is on RX):    New/renewal/insurance change PA/secondary ins. PA:  Previously Tried and Failed:    Rationale:      Insurance   Primary: [place] [facility]   Insurance ID:  [id number]     Secondary (if applicable):  Insurance ID:      Pharmacy Information (if different than what is on RX)  Name:    Phone:    Fax:  Raven Landeros RN

## 2024-07-10 NOTE — TELEPHONE ENCOUNTER
Retail Pharmacy Prior Authorization Team   Phone: [phone]    PA Initiation    Medication: testosterone cypionate (DEPOTESTOSTERONE) 200 MG/ML injection  Insurance Company: [employer] Non-Specialty PA's - Phone [phone] Fax [phone]  Pharmacy Filling the Rx: [id number] - [place], [place] - [address]  Filling Pharmacy Phone: [phone]  Filling Pharmacy Fax: [phone]  Start Date: 7/10/2024

## 2024-07-11 NOTE — TELEPHONE ENCOUNTER
Prior Authorization Approval    Authorization Effective Date: 6/10/2024  Authorization Expiration Date: 7/10/2025  Medication: testosterone cypionate (DEPOTESTOSTERONE) 200 MG/ML injection - APPROVED  Approved Dose/Quantity:    Reference #:     Insurance Company: [employer] Non-Specialty PA's - Phone [phone] Fax [phone]  Expected CoPay:       CoPay Card Available:      Foundation Assistance Needed:    Which Pharmacy is filling the prescription (Not needed for infusion/clinic administered): [id number] [place], [place] - [address] [place] [address]  Pharmacy Notified:  YES  Patient Notified:  YES

## 2024-07-17 ENCOUNTER — LAB (OUTPATIENT)
Dept: LAB | Facility: CLINIC | Age: 44
End: 2024-07-17
Payer: COMMERCIAL

## 2024-07-17 DIAGNOSIS — F64.0 TRANSSEXUALISM: ICD-10-CM

## 2024-07-17 PROCEDURE — 36415 COLL VENOUS BLD VENIPUNCTURE: CPT

## 2024-07-17 PROCEDURE — 84403 ASSAY OF TOTAL TESTOSTERONE: CPT

## 2024-07-19 LAB — TESTOST SERPL-MCNC: 973 NG/DL (ref 8–950)

## 2024-08-29 ENCOUNTER — NURSE TRIAGE (OUTPATIENT)
Dept: NURSING | Facility: CLINIC | Age: 44
End: 2024-08-29
Payer: COMMERCIAL

## 2024-08-29 ENCOUNTER — HOSPITAL ENCOUNTER (EMERGENCY)
Facility: CLINIC | Age: 44
Discharge: HOME | End: 2024-08-29
Attending: EMERGENCY MEDICINE | Admitting: EMERGENCY MEDICINE
Payer: COMMERCIAL

## 2024-08-29 VITALS
DIASTOLIC BLOOD PRESSURE: 81 MMHG | TEMPERATURE: 98.4 F | SYSTOLIC BLOOD PRESSURE: 125 MMHG | HEART RATE: 78 BPM | OXYGEN SATURATION: 97 % | RESPIRATION RATE: 15 BRPM

## 2024-08-29 DIAGNOSIS — H72.91 PERFORATION OF RIGHT TYMPANIC MEMBRANE: ICD-10-CM

## 2024-08-29 PROCEDURE — 99284 EMERGENCY DEPT VISIT MOD MDM: CPT | Performed by: EMERGENCY MEDICINE

## 2024-08-29 PROCEDURE — 99283 EMERGENCY DEPT VISIT LOW MDM: CPT | Performed by: EMERGENCY MEDICINE

## 2024-08-29 RX ORDER — OFLOXACIN 3 MG/ML
5 SOLUTION AURICULAR (OTIC) 2 TIMES DAILY
Qty: 5 ML | Refills: 0 | Status: SHIPPED | OUTPATIENT
Start: 2024-08-29

## 2024-08-29 ASSESSMENT — ACTIVITIES OF DAILY LIVING (ADL): ADLS_ACUITY_SCORE: 35

## 2024-08-30 NOTE — DISCHARGE INSTRUCTIONS
Please make an appointment to follow up with [facility] (phone: [phone]) as needed.

## 2024-08-30 NOTE — TELEPHONE ENCOUNTER
Triage Call:     Pt calling to report that she had a Q tip in hear and poked ear drum  Felt sharp pain  Still hurts a little bit  When she put another Q tip in her ear there was a very small amount of pink fluid at the end of it  Hearing is diminished   She took Ibuprofen 5 minutes ago for the pain    Disposition: See HCP within 4 hours or PCP triage. [facility]. Care advice given. Pt was given [facility] number if she would like a 2nd opinion this evening and she was given the address for [facility], which is nearby for her to go to this evening within the 4 hour time frame.     Reason for Disposition   [1] Pointed object was inserted into the ear canal AND [2] now has bleeding or earache  (Exception: Recent ear exam in doctor's office.)    Additional Information   Negative: Knocked out (unconscious) > 1 minute   Negative: Difficult to awaken or acting confused (e.g., disoriented, slurred speech)   Negative: Severe neck pain   Negative: Sounds like a life-threatening emergency to the triager   Negative: Head injury is main concern   Negative: Wound looks infected   Negative: Pierced ear, problems and symptoms   Negative: Caused by lightning   Negative: [1] Bleeding AND [2] won't stop after 10 minutes of direct pressure (using correct technique)   Negative: Skin is split open or gaping (or length > 1/4 inch or 6 mm)   Negative: [1] Animal or human bite AND [2] skin is broken (abraded, lacerated)   Negative: Walking is unsteady (i.e., falling or tilting to one side)   Negative: Sounds like a serious injury to the triager    Protocols used: Ear Injury-A-YOJANA Jewell RN   Triage Nurse Advisor on 8/29/2024 at 9:10 PM

## 2024-08-30 NOTE — ED TRIAGE NOTES
Presents to triage with complaints of right ear pain after accidentally inserting a qtip too far in about a half an hour ago. Reports some changes to hearing since this as well.      Triage Assessment (Adult)       Row Name 08/29/24 1375          Triage Assessment    Airway WDL WDL        Respiratory WDL    Respiratory WDL WDL        Skin Circulation/Temperature WDL    Skin Circulation/Temperature WDL WDL        Cardiac WDL    Cardiac WDL WDL        Peripheral/Neurovascular WDL    Peripheral Neurovascular WDL WDL        Cognitive/Neuro/Behavioral WDL    Cognitive/Neuro/Behavioral WDL WDL

## 2024-08-30 NOTE — ED PROVIDER NOTES
"    [place] EMERGENCY DEPARTMENT ([facility])    24       ED PROVIDER NOTE       History   No chief complaint on file.    JASMINA Gutierrez is a 44 year old adult who presents to the emergency department for otalgia.  Patient was using a Q-tip to clean their ear when they raise their arm causing the Q-tip to go deeper into their ear.  This caused pain and hearing changes.  They cleaned their ear with another Q-tip and noticed some pink drainage, no overt blood.  No previous surgeries or injuries to that ear.  No other symptoms.    Past Medical History  Past Medical History:   Diagnosis Date    Hypertension 5/10/12    Preeclampsia    Preeclampsia 10/5/2020    S/P  section 2012     Past Surgical History:   Procedure Laterality Date     SECTION  2012    CYSTOSCOPY N/A 2022    Procedure: Cystoscopy;  Surgeon: Heide Mock MD;  [facility]    LAPAROSCOPIC HYSTERECTOMY SUPRACERVICAL, BILATERAL SALPINGO-OOPHORECTOMY, COMBINED N/A 2022    Procedure: HYSTERECTOMY, SUPRACERVICAL, LAPAROSCOPIC, WITH SALPINGO-OOPHORECTOMY;  Surgeon: Heide Mock MD;  [facility]    MASTECTOMY SIMPLE BILATERAL Bilateral 2022    Procedure: MASTECTOMY, BILATERAL, SIMPLE, WITH NIPPLE GRAFTS, ONQ;  Surgeon: Fiona Lama MD;  Location: [facility] OR     acetaminophen (TYLENOL) 325 MG tablet  ibuprofen (ADVIL/MOTRIN) 800 MG tablet  needle, disp, (BD HYPODERMIC NEEDLE) 18G X 1\" MISC  Needle, Disp, 25G X 5/8\" MISC  ofloxacin (OCUFLOX) 0.3 % ophthalmic solution  senna-docusate (SENOKOT-S/PERICOLACE) 8.6-50 MG tablet  syringe, disposable, (B-D SYRINGE LUER-COURTNEY) 1 ML MISC  testosterone cypionate (DEPOTESTOSTERONE) 200 MG/ML injection      No Known Allergies  Family History  Family History   Problem Relation Age of Onset    Hypertension Father     Substance Abuse Father     Cancer Maternal Grandfather 50        Bladder cancer. Smoker.    Heart Disease Maternal Grandfather     " Asthma Daughter     Breast Cancer Mother     Glaucoma No family hx of     Macular Degeneration No family hx of      Social History   Social History     Tobacco Use    Smoking status: Every Day     Current packs/day: 0.00     Average packs/day: 0.2 packs/day for 20.2 years (3.0 ttl pk-yrs)     Types: Cigarettes     Start date: 9/1/2000     Last attempt to quit: 11/30/2020     Years since quitting: 3.7     Passive exposure: Never    Smokeless tobacco: Never   Vaping Use    Vaping status: Never Used   Substance Use Topics    Alcohol use: Yes    Drug use: Yes     Types: Marijuana      A medically appropriate review of systems was performed with pertinent positives and negatives noted in the HPI, and all other systems negative.    Physical Exam      Physical Exam  Vitals and nursing note reviewed.   Constitutional:       Appearance: Normal appearance.   HENT:      Right Ear: Tympanic membrane is perforated.      Ears:     Neurological:      Mental Status: Luis is alert.           ED Course, Procedures, & Data      Procedures                No results found for any visits on 08/29/24.  Medications - No data to display  Labs Ordered and Resulted from Time of ED Arrival to Time of ED Departure - No data to display  No orders to display              Assessment & Plan    Is a 44-year-old patient who presents with ear pain.  Patient was using a Q-tip when it was inadvertently forced further into the ear causing pain and hearing changes.  On exam patient has a perforation to the TM with small amount of bleeding.  We will start patient on a course of ofloxacin.  This does appear to be a size that will likely spontaneously heal up but we will provide referral to ENT in case this does not.  Discussed keeping ear free of water while healing.  Will discharge with return precautions.    I have reviewed the nursing notes. I have reviewed the findings, diagnosis, plan and need for follow up with the patient.    New Prescriptions     No medications on file       Final diagnoses:   None       Abimael Fowler DO  [facility] EMERGENCY DEPARTMENT  8/29/2024     Abimael Fowler DO  08/29/24 5732

## 2024-09-01 DIAGNOSIS — F64.9 GENDER DYSPHORIA: ICD-10-CM

## 2024-09-03 RX ORDER — TESTOSTERONE CYPIONATE 200 MG/ML
60 INJECTION, SOLUTION INTRAMUSCULAR WEEKLY
Qty: 4 ML | Refills: 5 | Status: SHIPPED | OUTPATIENT
Start: 2024-09-03

## 2024-09-06 ENCOUNTER — OFFICE VISIT (OUTPATIENT)
Dept: FAMILY MEDICINE | Facility: CLINIC | Age: 44
End: 2024-09-06
Payer: COMMERCIAL

## 2024-09-06 VITALS
WEIGHT: 178.4 LBS | RESPIRATION RATE: 16 BRPM | SYSTOLIC BLOOD PRESSURE: 128 MMHG | TEMPERATURE: 97.2 F | DIASTOLIC BLOOD PRESSURE: 79 MMHG | HEIGHT: 62 IN | BODY MASS INDEX: 32.83 KG/M2 | HEART RATE: 65 BPM | OXYGEN SATURATION: 97 %

## 2024-09-06 DIAGNOSIS — R55 VASOVAGAL SYNCOPE: Primary | ICD-10-CM

## 2024-09-06 DIAGNOSIS — F64.9 GENDER DYSPHORIA: ICD-10-CM

## 2024-09-06 DIAGNOSIS — D22.9 ATYPICAL NEVUS: ICD-10-CM

## 2024-09-06 DIAGNOSIS — E78.2 MIXED HYPERLIPIDEMIA: ICD-10-CM

## 2024-09-06 DIAGNOSIS — Z87.891 PERSONAL HISTORY OF TOBACCO USE, PRESENTING HAZARDS TO HEALTH: ICD-10-CM

## 2024-09-06 DIAGNOSIS — Z13.1 DIABETES MELLITUS SCREENING: ICD-10-CM

## 2024-09-06 LAB
CHOLEST SERPL-MCNC: 238 MG/DL
FASTING STATUS PATIENT QL REPORTED: NO
HBA1C MFR BLD: 4.9 % (ref 0–5.6)
HDLC SERPL-MCNC: 50 MG/DL
HGB BLD-MCNC: 15.3 G/DL (ref 11.7–17.7)
LDLC SERPL CALC-MCNC: 166 MG/DL
NONHDLC SERPL-MCNC: 188 MG/DL
TRIGL SERPL-MCNC: 109 MG/DL
TSH SERPL DL<=0.005 MIU/L-ACNC: 1.26 UIU/ML (ref 0.3–4.2)

## 2024-09-06 PROCEDURE — 83036 HEMOGLOBIN GLYCOSYLATED A1C: CPT | Performed by: STUDENT IN AN ORGANIZED HEALTH CARE EDUCATION/TRAINING PROGRAM

## 2024-09-06 PROCEDURE — 36415 COLL VENOUS BLD VENIPUNCTURE: CPT | Performed by: STUDENT IN AN ORGANIZED HEALTH CARE EDUCATION/TRAINING PROGRAM

## 2024-09-06 PROCEDURE — 90677 PCV20 VACCINE IM: CPT | Performed by: STUDENT IN AN ORGANIZED HEALTH CARE EDUCATION/TRAINING PROGRAM

## 2024-09-06 PROCEDURE — 84443 ASSAY THYROID STIM HORMONE: CPT | Performed by: STUDENT IN AN ORGANIZED HEALTH CARE EDUCATION/TRAINING PROGRAM

## 2024-09-06 PROCEDURE — 90472 IMMUNIZATION ADMIN EACH ADD: CPT | Performed by: STUDENT IN AN ORGANIZED HEALTH CARE EDUCATION/TRAINING PROGRAM

## 2024-09-06 PROCEDURE — 90656 IIV3 VACC NO PRSV 0.5 ML IM: CPT | Performed by: STUDENT IN AN ORGANIZED HEALTH CARE EDUCATION/TRAINING PROGRAM

## 2024-09-06 PROCEDURE — 80061 LIPID PANEL: CPT | Performed by: STUDENT IN AN ORGANIZED HEALTH CARE EDUCATION/TRAINING PROGRAM

## 2024-09-06 PROCEDURE — 85018 HEMOGLOBIN: CPT | Performed by: STUDENT IN AN ORGANIZED HEALTH CARE EDUCATION/TRAINING PROGRAM

## 2024-09-06 PROCEDURE — 99214 OFFICE O/P EST MOD 30 MIN: CPT | Mod: 25 | Performed by: STUDENT IN AN ORGANIZED HEALTH CARE EDUCATION/TRAINING PROGRAM

## 2024-09-06 PROCEDURE — 90471 IMMUNIZATION ADMIN: CPT | Performed by: STUDENT IN AN ORGANIZED HEALTH CARE EDUCATION/TRAINING PROGRAM

## 2024-09-06 RX ORDER — NEEDLES, DISPOSABLE 25GX5/8"
NEEDLE, DISPOSABLE MISCELLANEOUS
Qty: 100 EACH | Refills: 4 | Status: SHIPPED | OUTPATIENT
Start: 2024-09-06

## 2024-09-06 RX ORDER — POLYETHYLENE GLYCOL 3350 17 G
2 POWDER IN PACKET (EA) ORAL
Qty: 48 LOZENGE | Refills: 2 | Status: SHIPPED | OUTPATIENT
Start: 2024-09-06

## 2024-09-06 RX ORDER — SYRINGE, DISPOSABLE, 1 ML
SYRINGE, EMPTY DISPOSABLE MISCELLANEOUS
Qty: 1000 EACH | Refills: 1 | Status: SHIPPED | OUTPATIENT
Start: 2024-09-06

## 2024-09-06 NOTE — PROGRESS NOTES
"  Assessment & Plan     Gender dysphoria  T labs UTD, therapeutic. Doing well   - needle, disp, (BD HYPODERMIC NEEDLE) 18G X 1\" MISC  Dispense: 100 each; Refill: 4  - Needle, Disp, 25G X 5/8\" MISC  Dispense: 100 each; Refill: 1  - syringe, disposable, (B-D SYRINGE LUER-COURTNEY) 1 ML MISC  Dispense: 1000 each; Refill: 1    Vasovagal syncope  Hx of several lifetime episodes, most recently this summer in the context of possible dehydration + intoxicants. No persistent sx or concerning features suggestive of seizure. Basic labs today - if recurs would consider further workup including cardiac eval.   - Hemoglobin  - TSH with free T4 reflex  - Hemoglobin  - TSH with free T4 reflex    Atypical nevus  Wants to see Derm for skin check   - Adult Dermatology  Referral    Diabetes mellitus screening  - Hemoglobin A1c  - Hemoglobin A1c    Mixed hyperlipidemia  - Lipid panel reflex to direct LDL Fasting  - Lipid panel reflex to direct LDL Fasting    Personal history of tobacco use, presenting hazards to health  Contemplative. Interested in NRT w/ lozenges   - nicotine (COMMIT) 2 MG lozenge  Dispense: 48 lozenge; Refill: 2            Nicotine/Tobacco Cessation  Luis reports that Luis has been smoking cigarettes. Luis started smoking about 24 years ago. Luis has a 3 pack-year smoking history. Luis has never been exposed to tobacco smoke. Luis has never used smokeless tobacco.  Nicotine/Tobacco Cessation Plan  Pharmacotherapies : Nicotine lozenge      BMI  Estimated body mass index is 32.63 kg/m  as calculated from the following:    Height as of this encounter: 1.575 m (5' 2\").    Weight as of this encounter: 80.9 kg (178 lb 6.4 oz).         Return in about 6 months (around 3/6/2025).    Subjective   Luis is a 44 year old, presenting for the following health issues:  Follow Up    HPI      Moles - wants to see derm   One on R forearm, a couple on R leg, probably some that they can't see   A couple on R calf - has " "been there for a few years and thought it was an ingrown hair, picked at it     Fam hx skin stuff in grandparents, probably not melanoma       Has fainted 4-5x in their life -- most of the time can feel it coming on. Hasn't happened since 2007-8  Traveling in [facility] [place] the [place]. Was sitting on a bench playing a gamea ftre dinner outside, had had 1 beer. Smoked a lot of a joint w/ someone, got lightheaded, before they could lie down they just slid off the bench. Didn't hit head.       High LDL     L chest pain. Sometimes notices. Wonder if it's muscular. Doesn't hurt when they press. Doesn't seem positional.   - lasts seconds at a time   - R handed            Objective    /79 (BP Location: Left arm, Patient Position: Sitting, Cuff Size: Adult Regular)   Pulse 65   Temp 97.2  F (36.2  C) (Oral)   Resp 16   Ht 1.575 m (5' 2\")   Wt 80.9 kg (178 lb 6.4 oz)   LMP 12/01/2022 (Approximate)   SpO2 97%   BMI 32.63 kg/m    Body mass index is 32.63 kg/m .  Physical Exam   GENERAL: alert and no distress  EYES: Eyes grossly normal to inspection, PERRL and conjunctivae and sclerae normal  NECK: no adenopathy, no asymmetry, masses, or scars  RESP: lungs clear to auscultation - no rales, rhonchi or wheezes  CV: regular rate and rhythm, normal S1 S2, no S3 or S4, no murmur, click or rub, no peripheral edema  MS: no gross musculoskeletal defects noted, no edema    Results for orders placed or performed in visit on 09/06/24   Lipid panel reflex to direct LDL Fasting     Status: Abnormal   Result Value Ref Range    Cholesterol 238 (H) <200 mg/dL    Triglycerides 109 <150 mg/dL    Direct Measure HDL 50 >=40 mg/dL    LDL Cholesterol Calculated 166 (H) <=100 mg/dL    Non HDL Cholesterol 188 (H) <130 mg/dL    Patient Fasting > 8hrs? No     Narrative    Cholesterol  Desirable:  <200 mg/dL    Triglycerides  Normal:  Less than 150 mg/dL  Borderline High:  150-199 mg/dL  High:  200-499 mg/dL  Very High:  Greater " "than or equal to 500 mg/dL    Direct Measure HDL  Female:  Greater than or equal to 50 mg/dL   Male:  Greater than or equal to 40 mg/dL    LDL Cholesterol  Desirable:  <100mg/dL  Above Desirable:  100-129 mg/dL   Borderline High:  130-159 mg/dL   High:  160-189 mg/dL   Very High:  >= 190 mg/dL    Non HDL Cholesterol  Desirable:  130 mg/dL  Above Desirable:  130-159 mg/dL  Borderline High:  160-189 mg/dL  High:  190-219 mg/dL  Very High:  Greater than or equal to 220 mg/dL   Hemoglobin     Status: Normal   Result Value Ref Range    Hemoglobin 15.3 11.7 - 17.7 g/dL    Narrative    The generation of reference intervals for this test is currently based on binary male or female sex. If the electronic health record information indicates another gender identity or if Legal Sex is recorded as \"Unknown\", both male and female reference intervals are provided where applicable, and should be considered according to the individual's appropriate clinical context.   TSH with free T4 reflex     Status: Normal   Result Value Ref Range    TSH 1.26 0.30 - 4.20 uIU/mL   Hemoglobin A1c     Status: Normal   Result Value Ref Range    Hemoglobin A1C 4.9 0.0 - 5.6 %           Signed Electronically by: Oralia Ha DO    "

## 2024-11-19 ENCOUNTER — MYC MEDICAL ADVICE (OUTPATIENT)
Dept: FAMILY MEDICINE | Facility: CLINIC | Age: 44
End: 2024-11-19
Payer: COMMERCIAL

## 2024-11-19 DIAGNOSIS — F64.9 GENDER DYSPHORIA: ICD-10-CM

## 2024-11-19 NOTE — TELEPHONE ENCOUNTER
Pt sent MyChart requesting remaining refills be sent to [facility]. RN queued up medication and pharmacy and sending to provider.    Wilda Hogan RN

## 2024-11-20 RX ORDER — TESTOSTERONE CYPIONATE 200 MG/ML
60 INJECTION, SOLUTION INTRAMUSCULAR WEEKLY
Qty: 4 ML | Refills: 5 | Status: SHIPPED | OUTPATIENT
Start: 2024-11-20

## 2024-11-21 RX ORDER — NEEDLES, DISPOSABLE 25GX5/8"
NEEDLE, DISPOSABLE MISCELLANEOUS
Qty: 100 EACH | Refills: 4 | Status: SHIPPED | OUTPATIENT
Start: 2024-11-21

## 2024-11-21 RX ORDER — SYRINGE, DISPOSABLE, 1 ML
SYRINGE, EMPTY DISPOSABLE MISCELLANEOUS
Qty: 1000 EACH | Refills: 1 | Status: SHIPPED | OUTPATIENT
Start: 2024-11-21

## 2024-11-21 NOTE — TELEPHONE ENCOUNTER
Patient requesting that needles and syringes also be sent to [facility]. Prescriptions transferred per RN standing order.   Raven Landeros RN

## 2025-01-29 ENCOUNTER — OFFICE VISIT (OUTPATIENT)
Dept: DERMATOLOGY | Facility: CLINIC | Age: 45
End: 2025-01-29
Attending: PHYSICIAN ASSISTANT
Payer: COMMERCIAL

## 2025-01-29 DIAGNOSIS — D22.9 MULTIPLE BENIGN NEVI: Primary | ICD-10-CM

## 2025-01-29 DIAGNOSIS — D22.9 ATYPICAL NEVUS: ICD-10-CM

## 2025-01-29 DIAGNOSIS — D23.9 DERMATOFIBROMA: ICD-10-CM

## 2025-01-29 DIAGNOSIS — D48.5 NEOPLASM OF UNCERTAIN BEHAVIOR OF SKIN: ICD-10-CM

## 2025-01-29 DIAGNOSIS — L82.1 SEBORRHEIC KERATOSIS: ICD-10-CM

## 2025-01-29 DIAGNOSIS — L81.4 LENTIGO: ICD-10-CM

## 2025-01-29 DIAGNOSIS — D18.01 CHERRY ANGIOMA: ICD-10-CM

## 2025-01-29 PROCEDURE — 11102 TANGNTL BX SKIN SINGLE LES: CPT | Performed by: PHYSICIAN ASSISTANT

## 2025-01-29 PROCEDURE — 99203 OFFICE O/P NEW LOW 30 MIN: CPT | Mod: 25 | Performed by: PHYSICIAN ASSISTANT

## 2025-01-29 NOTE — LETTER
2025      Luis Gutierrez  [address]  [facility] [address]      Dear Colleague,    Thank you for referring your patient, Luis Gutierrez, to the [facility]. Please see a copy of my visit note below.    Luis Gutierrez is a pleasant 44 year old year old adult patient here today for mole on chest. Present for years, recently getting larger. No pain or bleeding. Luis doesn't always wear sunscreen. Patient has no other skin complaints today.  Remainder of the HPI, Meds, PMH, Allergies, FH, and SH was reviewed in chart.    Pertinent Hx:   No personal history of skin cancer.   Past Medical History:   Diagnosis Date     History of hemolysis, elevated liver enzymes, and low platelet (HELLP) syndrome 10/05/2020     History of pre-eclampsia 10/05/2020     Hypertension 05/10/2012    Preeclampsia     Preeclampsia 10/05/2020     S/P  section 2012       Past Surgical History:   Procedure Laterality Date      SECTION  2012     CYSTOSCOPY N/A 2022    Procedure: Cystoscopy;  Surgeon: Heide Mock MD;  Location: [facility]     LAPAROSCOPIC HYSTERECTOMY SUPRACERVICAL, BILATERAL SALPINGO-OOPHORECTOMY, COMBINED N/A 2022    Procedure: HYSTERECTOMY, SUPRACERVICAL, LAPAROSCOPIC, WITH SALPINGO-OOPHORECTOMY;  Surgeon: Heide Mock MD;  Location: [facility]     MASTECTOMY SIMPLE BILATERAL Bilateral 2022    Procedure: MASTECTOMY, BILATERAL, SIMPLE, WITH NIPPLE GRAFTS, ONQ;  Surgeon: Fiona Lama MD;  Location: [facility] OR        Family History   Problem Relation Age of Onset     Breast Cancer Mother      Hypertension Father      Substance Abuse Father      Cancer Maternal Grandfather 50        Bladder cancer. Smoker.     Heart Disease Maternal Grandfather      Asthma Daughter      Multiple Sclerosis Maternal Aunt      Rheumatoid Arthritis Maternal Great-Grandmother      Glaucoma No family hx of      Macular Degeneration No family hx of        Social History      Socioeconomic History     Marital status: Single     Spouse name: Not on file     Number of children: Not on file     Years of education: Not on file     Highest education level: Not on file   Occupational History     Not on file   Tobacco Use     Smoking status: Every Day     Current packs/day: 0.00     Average packs/day: 0.2 packs/day for 20.2 years (3.0 ttl pk-yrs)     Types: Cigarettes     Start date: 2000     Last attempt to quit: 2020     Years since quittin.1     Passive exposure: Never     Smokeless tobacco: Never   Vaping Use     Vaping status: Never Used   Substance and Sexual Activity     Alcohol use: Yes     Drug use: Yes     Types: Marijuana     Sexual activity: Yes     Birth control/protection: None   Other Topics Concern     Not on file   Social History Narrative     Not on file     Social Drivers of Health     Financial Resource Strain: Not on File (2020)    Received from [facility]    Financial Resource Strain      Financial Resource Strain: 0   Food Insecurity: Not on File (2020)    Received from [facility]    Food Insecurity      Food: 0   Transportation Needs: Not on File (2020)    Received from [facility]    Transportation Needs      Transportation: 0   Physical Activity: Not on File (2020)    Received from [facility]    Physical Activity      Physical Activity: 0   Stress: Not on File (2020)    Received from [facility]    Stress      Stress: 0   Social Connections: Not on File (2020)    Received from [facility]    Social Connections      Social Connections and Isolation: 0   Interpersonal Safety: Low Risk  (2024)    Interpersonal Safety      Do you feel physically and emotionally safe where you currently live?: Yes      Within the past 12 months, have you been hit, slapped, kicked or otherwise physically hurt by someone?: No      Within the past 12 months, have you been humiliated or emotionally abused in other ways by your partner  "or ex-partner?: No   Housing Stability: Not on File (2020)    Received from [facility]    Housing Stability      Housin       Outpatient Encounter Medications as of 2025   Medication Sig Dispense Refill     acetaminophen (TYLENOL) 325 MG tablet Take 3 tablets (975 mg) by mouth every 6 hours as needed for mild pain (Patient taking differently: Take 975 mg by mouth as needed for mild pain.) 50 tablet 0     ibuprofen (ADVIL/MOTRIN) 800 MG tablet Take 1 tablet (800 mg) by mouth every 6 hours as needed for other (mild and/or inflammatory pain) (Patient taking differently: Take 800 mg by mouth as needed for other (mild and/or inflammatory pain).) 30 tablet 0     needle, disp, (BD HYPODERMIC NEEDLE) 18G X 1\" MISC USE TO DRAW UP HORMONES ONCE WEEKLY 100 each 4     Needle, Disp, 25G X 5/8\" MISC 1 each once a week. 100 each 1     nicotine (COMMIT) 2 MG lozenge Place 1 lozenge (2 mg) inside cheek every hour as needed for nicotine withdrawal symptoms. 48 lozenge 2     ofloxacin (FLOXIN) 0.3 % otic solution Place 5 drops into the right ear 2 times daily. 5 mL 0     ofloxacin (OCUFLOX) 0.3 % ophthalmic solution Place 1-2 drops into the right eye 4 times daily 5 mL 0     syringe, disposable, (B-D SYRINGE LUER-COURTNEY) 1 ML MISC Use to draw hormones weekly 1000 each 1     testosterone cypionate (DEPOTESTOSTERONE) 200 MG/ML injection Inject 0.3 mLs (60 mg) subcutaneously once a week. 4 mL 5     No facility-administered encounter medications on file as of 2025.             O:   NAD, WDWN, Alert & Oriented, Mood & Affect wnl, Vitals stable   Here today alone   LMP 2022 (Approximate)    General appearance normal   Vitals stable   Alert, oriented and in no acute distress     `  Stuck on papules and brown macules on trunk and ext   Red papules on trunk  Brown papules and macules with regular pigment network and borders on torso and extremities  Pink firm papules on right arm and right leg consistent with DF  0.3 cm " brown irregular thin papule on mid upper chest  0.8 cm brown macule on right low back (reticular pigment network will monitor)    The remainder of skin exam is normal       Eyes: Conjunctivae/lids:Normal     ENT: Lips: normal    MSK:Normal    Cardiovascular: peripheral edema none    Pulm: Breathing Normal    Neuro/Psych: Orientation:Alert and Orientedx3 ; Mood/Affect:normal     A/P:  1. R/O atypical nevus on mid upper chest   TANGENTIAL BIOPSY SENT OUT:  After consent, anesthesia with LEC and prep, tangential excision performed and specimen sent out for permanent section histology.  No complications and routine wound care. Patient told to call our office in 1-2 weeks for result.      2. Seborrheic keratosis, lentigo, angioma, benign nevi, dermatofibroma   Monitor mole on right low back, picture taken   It was a pleasure speaking to Luis Gutierrez today.  \BENIGN LESIONS DISCUSSED WITH PATIENT:  I discussed the specifics of tumor, prognosis, and genetics of benign lesions.  I explained that treatment of these lesions would be purely cosmetic and not medically neccessary.  I discussed with patient different removal options including excision, cautery and /or laser.      Nature and genetics of benign skin lesions dicussed with patient.  Signs and Symptoms of skin cancer discussed with patient.  ABCDEs of melanoma reviewed with patient.  Patient encouraged to perform monthly skin exams.  UV precautions reviewed with patient.  Risks of non-melanoma skin cancer discussed with patient   Return to clinic pending biopsy results.       Again, thank you for allowing me to participate in the care of your patient.        Sincerely,        Renee Dempsey PA-C    Electronically signed No

## 2025-01-30 NOTE — PROGRESS NOTES
Luis Gutierrez is a pleasant 44 year old year old adult patient here today for mole on chest. Present for years, recently getting larger. No pain or bleeding. Luis doesn't always wear sunscreen. Patient has no other skin complaints today.  Remainder of the HPI, Meds, PMH, Allergies, FH, and SH was reviewed in chart.    Pertinent Hx:   No personal history of skin cancer.   Past Medical History:   Diagnosis Date    History of hemolysis, elevated liver enzymes, and low platelet (HELLP) syndrome 10/05/2020    History of pre-eclampsia 10/05/2020    Hypertension 05/10/2012    Preeclampsia    Preeclampsia 10/05/2020    S/P  section 2012       Past Surgical History:   Procedure Laterality Date     SECTION  2012    CYSTOSCOPY N/A 2022    Procedure: Cystoscopy;  Surgeon: Heide Mock MD;  [facility]    LAPAROSCOPIC HYSTERECTOMY SUPRACERVICAL, BILATERAL SALPINGO-OOPHORECTOMY, COMBINED N/A 2022    Procedure: HYSTERECTOMY, SUPRACERVICAL, LAPAROSCOPIC, WITH SALPINGO-OOPHORECTOMY;  Surgeon: Heide Mock MD;  [facility]    MASTECTOMY SIMPLE BILATERAL Bilateral 2022    Procedure: MASTECTOMY, BILATERAL, SIMPLE, WITH NIPPLE GRAFTS, ONQ;  Surgeon: Fiona Lama MD;  Location: [facility] OR        Family History   Problem Relation Age of Onset    Breast Cancer Mother     Hypertension Father     Substance Abuse Father     Cancer Maternal Grandfather 50        Bladder cancer. Smoker.    Heart Disease Maternal Grandfather     Asthma Daughter     Multiple Sclerosis Maternal Aunt     Rheumatoid Arthritis Maternal Great-Grandmother     Glaucoma No family hx of     Macular Degeneration No family hx of        Social History     Socioeconomic History    Marital status: Single     Spouse name: Not on file    Number of children: Not on file    Years of education: Not on file    Highest education level: Not on file   Occupational History    Not on file   Tobacco Use    Smoking  status: Every Day     Current packs/day: 0.00     Average packs/day: 0.2 packs/day for 20.2 years (3.0 ttl pk-yrs)     Types: Cigarettes     Start date: 2000     Last attempt to quit: 2020     Years since quittin.1     Passive exposure: Never    Smokeless tobacco: Never   Vaping Use    Vaping status: Never Used   Substance and Sexual Activity    Alcohol use: Yes    Drug use: Yes     Types: Marijuana    Sexual activity: Yes     Birth control/protection: None   Other Topics Concern    Not on file   Social History Narrative    Not on file     Social Drivers of Health     Financial Resource Strain: Not on File (2020)    [facility] from [facility]    Financial Resource Strain     Financial Resource Strain: 0   Food Insecurity: Not on File (2020)    Received from [facility]    Food Insecurity     Food: 0   Transportation Needs: Not on File (2020)    Received from [facility]    Transportation Needs     Transportation: 0   Physical Activity: Not on File (2020)    Received from [facility]    Physical Activity     Physical Activity: 0   Stress: Not on File (2020)    Received from [facility]    Stress     Stress: 0   Social Connections: Not on File (2020)    Received from [facility]    Social Connections     Social Connections and Isolation: 0   Interpersonal Safety: Low Risk  (2024)    Interpersonal Safety     Do you feel physically and emotionally safe where you currently live?: Yes     Within the past 12 months, have you been hit, slapped, kicked or otherwise physically hurt by someone?: No     Within the past 12 months, have you been humiliated or emotionally abused in other ways by your partner or ex-partner?: No   Housing Stability: Not on File (2020)    Received from [facility]    Housing Stability     Housin       Outpatient Encounter Medications as of 2025   Medication Sig Dispense Refill    acetaminophen (TYLENOL) 325 MG tablet Take 3 tablets  "(975 mg) by mouth every 6 hours as needed for mild pain (Patient taking differently: Take 975 mg by mouth as needed for mild pain.) 50 tablet 0    ibuprofen (ADVIL/MOTRIN) 800 MG tablet Take 1 tablet (800 mg) by mouth every 6 hours as needed for other (mild and/or inflammatory pain) (Patient taking differently: Take 800 mg by mouth as needed for other (mild and/or inflammatory pain).) 30 tablet 0    needle, disp, (BD HYPODERMIC NEEDLE) 18G X 1\" MISC USE TO DRAW UP HORMONES ONCE WEEKLY 100 each 4    Needle, Disp, 25G X 5/8\" MISC 1 each once a week. 100 each 1    nicotine (COMMIT) 2 MG lozenge Place 1 lozenge (2 mg) inside cheek every hour as needed for nicotine withdrawal symptoms. 48 lozenge 2    ofloxacin (FLOXIN) 0.3 % otic solution Place 5 drops into the right ear 2 times daily. 5 mL 0    ofloxacin (OCUFLOX) 0.3 % ophthalmic solution Place 1-2 drops into the right eye 4 times daily 5 mL 0    syringe, disposable, (B-D SYRINGE LUER-COURTNEY) 1 ML MISC Use to draw hormones weekly 1000 each 1    testosterone cypionate (DEPOTESTOSTERONE) 200 MG/ML injection Inject 0.3 mLs (60 mg) subcutaneously once a week. 4 mL 5     No facility-administered encounter medications on file as of 1/29/2025.             O:   NAD, WDWN, Alert & Oriented, Mood & Affect wnl, Vitals stable   Here today alone   LMP 12/01/2022 (Approximate)    General appearance normal   Vitals stable   Alert, oriented and in no acute distress     `  Stuck on papules and brown macules on trunk and ext   Red papules on trunk  Brown papules and macules with regular pigment network and borders on torso and extremities  Pink firm papules on right arm and right leg consistent with DF  0.3 cm brown irregular thin papule on mid upper chest  0.8 cm brown macule on right low back (reticular pigment network will monitor)    The remainder of skin exam is normal       Eyes: Conjunctivae/lids:Normal     ENT: Lips: normal    MSK:Normal    Cardiovascular: peripheral edema " none    Pulm: Breathing Normal    Neuro/Psych: Orientation:Alert and Orientedx3 ; Mood/Affect:normal     A/P:  1. R/O atypical nevus on mid upper chest   TANGENTIAL BIOPSY SENT OUT:  After consent, anesthesia with LEC and prep, tangential excision performed and specimen sent out for permanent section histology.  No complications and routine wound care. Patient told to call our office in 1-2 weeks for result.      2. Seborrheic keratosis, lentigo, angioma, benign nevi, dermatofibroma   Monitor mole on right low back, picture taken   It was a pleasure speaking to Luis Gutierrez today.  \BENIGN LESIONS DISCUSSED WITH PATIENT:  I discussed the specifics of tumor, prognosis, and genetics of benign lesions.  I explained that treatment of these lesions would be purely cosmetic and not medically neccessary.  I discussed with patient different removal options including excision, cautery and /or laser.      Nature and genetics of benign skin lesions dicussed with patient.  Signs and Symptoms of skin cancer discussed with patient.  ABCDEs of melanoma reviewed with patient.  Patient encouraged to perform monthly skin exams.  UV precautions reviewed with patient.  Risks of non-melanoma skin cancer discussed with patient   Return to clinic pending biopsy results.

## 2025-03-03 ENCOUNTER — MYC REFILL (OUTPATIENT)
Dept: FAMILY MEDICINE | Facility: CLINIC | Age: 45
End: 2025-03-03
Payer: COMMERCIAL

## 2025-03-03 DIAGNOSIS — F64.9 GENDER DYSPHORIA: ICD-10-CM

## 2025-03-04 RX ORDER — TESTOSTERONE CYPIONATE 200 MG/ML
60 INJECTION, SOLUTION INTRAMUSCULAR WEEKLY
Qty: 4 ML | Refills: 5 | Status: SHIPPED | OUTPATIENT
Start: 2025-03-04

## 2025-03-04 RX ORDER — NEEDLES, DISPOSABLE 25GX5/8"
NEEDLE, DISPOSABLE MISCELLANEOUS
Qty: 100 EACH | Refills: 4 | Status: SHIPPED | OUTPATIENT
Start: 2025-03-04

## 2025-03-04 RX ORDER — SYRINGE, DISPOSABLE, 1 ML
SYRINGE, EMPTY DISPOSABLE MISCELLANEOUS
Qty: 1000 EACH | Refills: 1 | Status: SHIPPED | OUTPATIENT
Start: 2025-03-04

## 2025-03-04 NOTE — TELEPHONE ENCOUNTER
"Request for medication refill:    Medication Name: needle, disp, (BD HYPODERMIC NEEDLE) 18G X 1\" MISC     Needle, Disp, 25G X 5/8\" MISC     syringe, disposable, (B-D SYRINGE LUER-COURTNEY) 1 ML MISC     testosterone cypionate (DEPOTESTOSTERONE) 200 MG/ML injection     Providers if patient needs an appointment and you are willing to give a one month supply please refill for one month and  send a MyChart using \".[email or web address]\" .Or route chart to \"[facility] \" . To call patient and inform of limited refill and providers request to make an appointment. (Giving one month refill in non controlled medications is strongly recommended before denial)    If refill has been denied, meaning absolutely no refills without visit, please complete the smart phrase \".SMIRXREFUSE\" and route it to the \"[facility] MED REFILLS\"  pool to inform the patient and the pharmacy.    Demetrius Manzano, CMA      "

## 2025-05-17 ENCOUNTER — HEALTH MAINTENANCE LETTER (OUTPATIENT)
Age: 45
End: 2025-05-17

## (undated) DEVICE — ESU GROUND PAD UNIVERSAL W/O CORD

## (undated) DEVICE — SOL NACL 0.9% INJ 1000ML BAG 2B1324X

## (undated) DEVICE — BNDG ELASTIC 6"X5YDS UNSTERILE 6611-60

## (undated) DEVICE — GLOVE BIOGEL PI MICRO SZ 7.0 48570

## (undated) DEVICE — RX BACITRACIN OINTMENT 14G 0.5OZ 45802-060-01

## (undated) DEVICE — ESU GROUND PAD ADULT W/CORD E7507

## (undated) DEVICE — COVER CAMERA IN-LIGHT DISP LT-C02

## (undated) DEVICE — TUBING SUCTION MEDI-VAC SOFT 3/16"X20' N520A

## (undated) DEVICE — DRAIN JACKSON PRATT RESERVOIR 100ML SU130-1305

## (undated) DEVICE — BNDG ELASTIC 6" DBL LENGTH UNSTERILE 6611-16

## (undated) DEVICE — GLOVE BIOGEL PI MICRO INDICATOR UNDERGLOVE SZ 7.5 48975

## (undated) DEVICE — SOL NACL 0.9% IRRIG 500ML BOTTLE 2F7123

## (undated) DEVICE — BLADE KNIFE SURG 10 371110

## (undated) DEVICE — SUCTION MANIFOLD NEPTUNE 2 SYS 1 PORT 702-025-000

## (undated) DEVICE — SU VICRYL 4-0 PS-2 18" UND J496H

## (undated) DEVICE — ESU LIGASURE LAPAROSCOPIC BLUNT TIP SEALER 5MMX37CM LF1837

## (undated) DEVICE — LINEN GOWN X4 5410

## (undated) DEVICE — DRSG KERLIX 4 1/2"X4YDS ROLL 6730

## (undated) DEVICE — SU VICRYL 3-0 FS-1 27" J442H

## (undated) DEVICE — ESU HOLDER LAP INST DISP PURPLE LONG 330MM H-PRO-330

## (undated) DEVICE — PAD CHUX UNDERPAD 30X30"

## (undated) DEVICE — DRAPE LAP TRANSVERSE 29421

## (undated) DEVICE — LINEN TOWEL PACK X5 5464

## (undated) DEVICE — PREP SKIN SCRUB TRAY 4461A

## (undated) DEVICE — SOL NACL 0.9% IRRIG 1000ML BOTTLE 2F7124

## (undated) DEVICE — SU MONOCRYL 4-0 PS-2 18" UND Y496G

## (undated) DEVICE — ENDO ACCESS PLATFORM GELPOINT SGL INCISION CNGL2

## (undated) DEVICE — DRSG ABDOMINAL 07 1/2X8" 7197D

## (undated) DEVICE — TUBING IRRIG CYSTO/BLADDER SET 81" LF 2C4040

## (undated) DEVICE — SU SILK 2-0 SH 30" K833H

## (undated) DEVICE — ENDO TROCAR CONMED AIRSEAL BLADELESS 05X120MM IAS5-120LP

## (undated) DEVICE — PACK MINOR CUSTOM ASC

## (undated) DEVICE — EYE MARKING PAD 581057

## (undated) DEVICE — SUCTION MANIFOLD NEPTUNE 2 SYS 4 PORT 0702-020-000

## (undated) DEVICE — SPONGE LAP 18X18" X8435

## (undated) DEVICE — SUCTION IRR STRYKERFLOW II W/TIP 250-070-520

## (undated) DEVICE — STRAP KNEE/BODY 31143004

## (undated) DEVICE — TUBING INSUFFLATION W/FILTER 10FT GS1016

## (undated) DEVICE — SOL WATER IRRIG 1000ML BOTTLE 2F7114

## (undated) DEVICE — SOL ADH LIQUID BENZOIN SWAB 0.6ML C1544

## (undated) DEVICE — JELLY LUBRICATING SURGILUBE 2OZ TUBE 281020502

## (undated) DEVICE — DRSG TEGADERM 2 3/8X2 3/4" 1624W

## (undated) DEVICE — WIPES FOLEY CARE SURESTEP PROVON DFC100

## (undated) DEVICE — TUBING CONMED AIRSEAL SMOKE EVAC INSUFFLATION ASM-EVAC

## (undated) DEVICE — ADH SKIN CLOSURE PREMIERPRO EXOFIN 1.0ML 3470

## (undated) DEVICE — SU ETHILON 3-0 PS-1 18" 1663H

## (undated) DEVICE — PAD CHUX UNDERPAD 30X36" P3036C

## (undated) DEVICE — PEN MARKING SKIN TYCO DEVON DUAL TIP 31145868

## (undated) DEVICE — CATH TRAY FOLEY SURESTEP 16FR WDRAIN BAG STLK LATEX A300316A

## (undated) DEVICE — GLOVE PROTEXIS MICRO 6.5  2D73PM65

## (undated) DEVICE — RETR ELEV / UTERINE MANIPULATOR V-CARE LG CUP 60-6085-202A

## (undated) DEVICE — SUCTION TIP YANKAUER STR K87

## (undated) DEVICE — BAG URIMETER FOLEY KIT W/16FR FOLEY

## (undated) DEVICE — ESU ELEC BLADE HEX-LOCKING 2.5" E1450X

## (undated) DEVICE — PEN MARKING SKIN W/LABELS 31145884

## (undated) DEVICE — SU VICRYL 0 UR-6 27" J603H

## (undated) DEVICE — Device

## (undated) DEVICE — ENDO POUCH UNIVERSAL RETRIEVAL SYSTEM INZII 12/15MM CD004

## (undated) DEVICE — PREP CHLORAPREP 26ML TINTED ORANGE  260815

## (undated) DEVICE — ENDO SCOPE WARMER LF TM500

## (undated) DEVICE — CLOSURE SYS SKIN PREMIERPRO EXOFINFUSION 4X60CM 3473

## (undated) DEVICE — BLADE KNIFE SURG 15 371115

## (undated) DEVICE — DRAIN JACKSON PRATT 15FR ROUND SU130-1323

## (undated) DEVICE — STPL SKIN 35W APPOSE 8886803712

## (undated) DEVICE — ESU PENCIL SMOKE EVAC W/ROCKER SWITCH 0703-047-000

## (undated) RX ORDER — ACETAMINOPHEN 325 MG/1
TABLET ORAL
Status: DISPENSED
Start: 2022-12-20

## (undated) RX ORDER — BUPIVACAINE HYDROCHLORIDE 2.5 MG/ML
INJECTION, SOLUTION EPIDURAL; INFILTRATION; INTRACAUDAL
Status: DISPENSED
Start: 2022-12-20

## (undated) RX ORDER — CEFAZOLIN SODIUM 1 G/3ML
INJECTION, POWDER, FOR SOLUTION INTRAMUSCULAR; INTRAVENOUS
Status: DISPENSED
Start: 2022-02-18

## (undated) RX ORDER — PROPOFOL 10 MG/ML
INJECTION, EMULSION INTRAVENOUS
Status: DISPENSED
Start: 2022-02-18

## (undated) RX ORDER — BUPIVACAINE HYDROCHLORIDE 2.5 MG/ML
INJECTION, SOLUTION INFILTRATION; PERINEURAL
Status: DISPENSED
Start: 2022-02-18

## (undated) RX ORDER — FENTANYL CITRATE 50 UG/ML
INJECTION, SOLUTION INTRAMUSCULAR; INTRAVENOUS
Status: DISPENSED
Start: 2022-02-18

## (undated) RX ORDER — CEFAZOLIN SODIUM 2 G/50ML
SOLUTION INTRAVENOUS
Status: DISPENSED
Start: 2022-02-18

## (undated) RX ORDER — DEXAMETHASONE SODIUM PHOSPHATE 4 MG/ML
INJECTION, SOLUTION INTRA-ARTICULAR; INTRALESIONAL; INTRAMUSCULAR; INTRAVENOUS; SOFT TISSUE
Status: DISPENSED
Start: 2022-02-18

## (undated) RX ORDER — FENTANYL CITRATE 50 UG/ML
INJECTION, SOLUTION INTRAMUSCULAR; INTRAVENOUS
Status: DISPENSED
Start: 2022-12-20

## (undated) RX ORDER — LIDOCAINE HYDROCHLORIDE 20 MG/ML
INJECTION, SOLUTION EPIDURAL; INFILTRATION; INTRACAUDAL; PERINEURAL
Status: DISPENSED
Start: 2022-02-18

## (undated) RX ORDER — OXYCODONE HYDROCHLORIDE 5 MG/1
TABLET ORAL
Status: DISPENSED
Start: 2022-02-18

## (undated) RX ORDER — PHENAZOPYRIDINE HYDROCHLORIDE 200 MG/1
TABLET, FILM COATED ORAL
Status: DISPENSED
Start: 2022-12-20

## (undated) RX ORDER — ACETAMINOPHEN 325 MG/1
TABLET ORAL
Status: DISPENSED
Start: 2022-02-18

## (undated) RX ORDER — GLYCOPYRROLATE 0.2 MG/ML
INJECTION, SOLUTION INTRAMUSCULAR; INTRAVENOUS
Status: DISPENSED
Start: 2022-02-18

## (undated) RX ORDER — KETOROLAC TROMETHAMINE 30 MG/ML
INJECTION, SOLUTION INTRAMUSCULAR; INTRAVENOUS
Status: DISPENSED
Start: 2022-02-18

## (undated) RX ORDER — CEFAZOLIN SODIUM/WATER 2 G/20 ML
SYRINGE (ML) INTRAVENOUS
Status: DISPENSED
Start: 2022-12-20

## (undated) RX ORDER — OXYCODONE HYDROCHLORIDE 5 MG/1
TABLET ORAL
Status: DISPENSED
Start: 2022-12-20

## (undated) RX ORDER — HYDROMORPHONE HYDROCHLORIDE 1 MG/ML
INJECTION, SOLUTION INTRAMUSCULAR; INTRAVENOUS; SUBCUTANEOUS
Status: DISPENSED
Start: 2022-12-20

## (undated) RX ORDER — KETOROLAC TROMETHAMINE 30 MG/ML
INJECTION, SOLUTION INTRAMUSCULAR; INTRAVENOUS
Status: DISPENSED
Start: 2022-12-20

## (undated) RX ORDER — ONDANSETRON 2 MG/ML
INJECTION INTRAMUSCULAR; INTRAVENOUS
Status: DISPENSED
Start: 2022-02-18